# Patient Record
Sex: FEMALE | Race: WHITE | NOT HISPANIC OR LATINO | Employment: FULL TIME | URBAN - METROPOLITAN AREA
[De-identification: names, ages, dates, MRNs, and addresses within clinical notes are randomized per-mention and may not be internally consistent; named-entity substitution may affect disease eponyms.]

---

## 2018-03-31 ENCOUNTER — OFFICE VISIT (OUTPATIENT)
Dept: URGENT CARE | Facility: PHYSICIAN GROUP | Age: 68
End: 2018-03-31
Payer: COMMERCIAL

## 2018-03-31 VITALS
BODY MASS INDEX: 32.49 KG/M2 | HEART RATE: 83 BPM | WEIGHT: 195 LBS | HEIGHT: 65 IN | TEMPERATURE: 98.1 F | DIASTOLIC BLOOD PRESSURE: 78 MMHG | RESPIRATION RATE: 16 BRPM | OXYGEN SATURATION: 96 % | SYSTOLIC BLOOD PRESSURE: 120 MMHG

## 2018-03-31 DIAGNOSIS — L08.9 SKIN INFECTION: ICD-10-CM

## 2018-03-31 DIAGNOSIS — J45.20 MILD INTERMITTENT ASTHMA WITHOUT COMPLICATION: ICD-10-CM

## 2018-03-31 PROCEDURE — 99214 OFFICE O/P EST MOD 30 MIN: CPT | Performed by: NURSE PRACTITIONER

## 2018-03-31 RX ORDER — ALBUTEROL SULFATE 90 UG/1
2 AEROSOL, METERED RESPIRATORY (INHALATION) EVERY 6 HOURS PRN
Qty: 8.5 G | Refills: 0 | Status: SHIPPED | OUTPATIENT
Start: 2018-03-31 | End: 2018-06-23

## 2018-03-31 ASSESSMENT — ENCOUNTER SYMPTOMS
WEAKNESS: 0
FEVER: 0
CHILLS: 0
NECK PAIN: 0
BRUISES/BLEEDS EASILY: 0
MYALGIAS: 0

## 2018-03-31 NOTE — PROGRESS NOTES
Subjective:      Gigi Pagan is a 67 y.o. female who presents with Neck Pain (with swelling and readness from a scratch on the left side of her neck, unknown how it got there)            HPI  Gigi is here for left sided neck wound since yesterday morning when she awoke with injury. States unknown cause for injury, denies scratching self. States has used curling iron in past but has not used lately. Has been cleaning with rubbing alcohol.    Requesting refill of albuterol for intermittent asthma, no problems with this currently.    PMH:  has a past medical history of Anesthesia; Arthritis; ASTHMA; Backpain; Bronchitis; Cancer (CMS-HCC); Hypothyroidism; Indigestion; Jaundice; and Pneumonia. She also has no past medical history of Angina; Arrhythmia; CATARACT; Congestive heart failure (CMS-HCC); COPD; Diabetes; Dialysis; Glaucoma; Heart murmur; Heart valve disease; Hypertension; Infectious disease; Myocardial infarct; Other specified symptom associated with female genital organs; Pacemaker; Personal history of venous thrombosis and embolism; Psychiatric problem; Renal disorder; Rheumatic fever; Seizure (CMS-HCC); Stroke (CMS-HCC); Unspecified hemorrhagic conditions; or Unspecified urinary incontinence.  MEDS:   Current Outpatient Prescriptions:   •  mupirocin (BACTROBAN) 2 % Ointment, Apply 1 Application to affected area(s) 2 times a day., Disp: 60 g, Rfl: 0  •  albuterol 108 (90 Base) MCG/ACT Aero Soln inhalation aerosol, Inhale 2 Puffs by mouth every 6 hours as needed., Disp: 8.5 g, Rfl: 0  •  SYNTHROID 75 MCG TABS, Take 1 Tab by mouth every day., Disp: , Rfl:   •  cyclobenzaprine (FLEXERIL) 10 MG Tab, Take 1 Tab by mouth 3 times a day as needed., Disp: 30 Tab, Rfl: 0  •  meloxicam (MOBIC) 15 MG tablet, Take 1 Tab by mouth every day., Disp: 30 Tab, Rfl: 0  •  azithromycin (AZASITE) 1 % ophthalmic solution, Place 1 Drop in left eye every day., Disp: 2.5 mL, Rfl: 0  •  tramadol (ULTRAM) 50 MG Tab, Take  1-2 Tabs by mouth every four hours as needed. (Patient not taking: Reported on 10/17/2016), Disp: 20 Tab, Rfl: 0  •  fluticasone (FLONASE) 50 MCG/ACT nasal spray, Spray 2 Sprays in nose every day. Each Nostril (Patient not taking: Reported on 10/17/2016), Disp: 16 g, Rfl: 0  •  ALBUTEROL SULFATE INH, Inhale  by mouth., Disp: , Rfl:   •  hydrocodone-acetaminophen (VICODIN) 5-500 MG TABS, Take 1-2 Tabs by mouth every four hours as needed., Disp: , Rfl:   ALLERGIES:   Allergies   Allergen Reactions   • Gadolinium Anaphylaxis   • Tape Contact Dermatitis     SURGHX:   Past Surgical History:   Procedure Laterality Date   • PELVISCOPY  2011    Performed by KANDIS AARON at SURGERY [facility] ORS   • OMENTECTOMY  2011    Performed by KANDIS AARON at [facility]   • BIOPSY GENERAL  2011    Performed by KANDIS AARON at SURGERY [facility] ORS   • OOPHORECTOMY  2011    Performed by KANDIS AARON at SURGERY [facility] OR[facility]   • ERCP IN OR  2010    Performed by JONATAN CURRY at SURGERY [facility]   • ERCP W/ INSERTION STENT/TUBE  3/29/2010    Performed by BUNNY BAHENA at [facility]   • ERCP W/SPHINCTEROTOMY/PAPILL.  3/29/2010    Performed by BUNNY BAHENA at [facility]   • PB RESEC LIVER,PART LOBECTOMY     • GYN SURGERY      partial hysterectomy   • OTHER      1970 tonsilectomy    • OTHER ORTHOPEDIC SURGERY       spinal fusion  minucus disk   • PB  DELIVERY ONLY      c section x 4     SOCHX:  reports that she has never smoked. She has never used smokeless tobacco. She reports that she drinks alcohol. She reports that she does not use drugs.  FH: Family history was reviewed, no pertinent findings to report    Review of Systems   Constitutional: Negative for chills, fever and malaise/fatigue.   Musculoskeletal: Negative for myalgias and neck pain.   Skin: Negative for itching and rash.   Neurological: Negative for weakness.  "  Endo/Heme/Allergies: Does not bruise/bleed easily.   All other systems reviewed and are negative.         Objective:     /78   Pulse 83   Temp 36.7 °C (98.1 °F)   Resp 16   Ht 1.651 m (5' 5\")   Wt 88.5 kg (195 lb)   SpO2 96%   BMI 32.45 kg/m²      Physical Exam   Constitutional: She is oriented to person, place, and time. Vital signs are normal. She appears well-developed and well-nourished. She is active.  Non-toxic appearance. She does not have a sickly appearance. She does not appear ill. No distress.   HENT:   Head: Normocephalic.   Eyes: Conjunctivae and EOM are normal. Pupils are equal, round, and reactive to light.   Neck: Normal range of motion. Neck supple. No spinous process tenderness and no muscular tenderness present. No neck rigidity. No edema, no erythema and normal range of motion present.       Cardiovascular: Normal rate.    Pulmonary/Chest: Effort normal.   Musculoskeletal: Normal range of motion.   Neurological: She is alert and oriented to person, place, and time.   Skin: Skin is warm and dry. Laceration noted. No abrasion, no bruising, no burn, no ecchymosis, no lesion and no rash noted. She is not diaphoretic. There is erythema.   Superficial laceration with scab formation with localized redness around wound, slight swelling to site. Slight TTP to site.   Vitals reviewed.              Assessment/Plan:     1. Skin infection    - mupirocin (BACTROBAN) 2 % Ointment; Apply 1 Application to affected area(s) 2 times a day.  Dispense: 60 g; Refill: 0    2. Mild intermittent asthma without complication    - albuterol 108 (90 Base) MCG/ACT Aero Soln inhalation aerosol; Inhale 2 Puffs by mouth every 6 hours as needed.  Dispense: 8.5 g; Refill: 0    May clean area with mild soap, do not scrub, wash with tepid water, pat dry  Keep covered with abx ointment use  May use NSAID for any pain/discomfort  Monitor for increase in wound size, pain, redness with blistering, fever- " re-evaluate

## 2018-05-30 ENCOUNTER — HOSPITAL ENCOUNTER (OUTPATIENT)
Dept: RADIOLOGY | Facility: MEDICAL CENTER | Age: 68
End: 2018-05-30

## 2018-06-23 ENCOUNTER — APPOINTMENT (OUTPATIENT)
Dept: RADIOLOGY | Facility: MEDICAL CENTER | Age: 68
End: 2018-06-23
Attending: EMERGENCY MEDICINE
Payer: COMMERCIAL

## 2018-06-23 ENCOUNTER — HOSPITAL ENCOUNTER (OUTPATIENT)
Facility: MEDICAL CENTER | Age: 68
End: 2018-06-25
Attending: EMERGENCY MEDICINE | Admitting: HOSPITALIST
Payer: COMMERCIAL

## 2018-06-23 DIAGNOSIS — Z85.9 HISTORY OF CANCER: ICD-10-CM

## 2018-06-23 DIAGNOSIS — R29.810 FACIAL DROOP: ICD-10-CM

## 2018-06-23 DIAGNOSIS — R53.1 WEAKNESS: ICD-10-CM

## 2018-06-23 DIAGNOSIS — R51.9 ACUTE NONINTRACTABLE HEADACHE, UNSPECIFIED HEADACHE TYPE: ICD-10-CM

## 2018-06-23 DIAGNOSIS — R40.4 TRANSIENT ALTERATION OF AWARENESS: ICD-10-CM

## 2018-06-23 DIAGNOSIS — R20.2 PARESTHESIAS: ICD-10-CM

## 2018-06-23 DIAGNOSIS — R11.2 NON-INTRACTABLE VOMITING WITH NAUSEA, UNSPECIFIED VOMITING TYPE: ICD-10-CM

## 2018-06-23 DIAGNOSIS — R29.90 STROKE-LIKE SYMPTOM: ICD-10-CM

## 2018-06-23 LAB
ALBUMIN SERPL BCP-MCNC: 4.4 G/DL (ref 3.2–4.9)
ALBUMIN/GLOB SERPL: 1.6 G/DL
ALP SERPL-CCNC: 56 U/L (ref 30–99)
ALT SERPL-CCNC: 5 U/L (ref 2–50)
ANION GAP SERPL CALC-SCNC: 14 MMOL/L (ref 0–11.9)
ANISOCYTOSIS BLD QL SMEAR: ABNORMAL
APPEARANCE UR: CLEAR
APTT PPP: 25.3 SEC (ref 24.7–36)
AST SERPL-CCNC: 20 U/L (ref 12–45)
BASOPHILS # BLD AUTO: 0 % (ref 0–1.8)
BASOPHILS # BLD: 0 K/UL (ref 0–0.12)
BILIRUB SERPL-MCNC: 0.7 MG/DL (ref 0.1–1.5)
BILIRUB UR QL STRIP.AUTO: NEGATIVE
BUN SERPL-MCNC: 11 MG/DL (ref 8–22)
CALCIUM SERPL-MCNC: 9.5 MG/DL (ref 8.5–10.5)
CHLORIDE SERPL-SCNC: 109 MMOL/L (ref 96–112)
CO2 SERPL-SCNC: 16 MMOL/L (ref 20–33)
COLOR UR: YELLOW
CREAT SERPL-MCNC: 0.74 MG/DL (ref 0.5–1.4)
EOSINOPHIL # BLD AUTO: 0 K/UL (ref 0–0.51)
EOSINOPHIL NFR BLD: 0 % (ref 0–6.9)
ERYTHROCYTE [DISTWIDTH] IN BLOOD BY AUTOMATED COUNT: 41.1 FL (ref 35.9–50)
GLOBULIN SER CALC-MCNC: 2.8 G/DL (ref 1.9–3.5)
GLUCOSE SERPL-MCNC: 121 MG/DL (ref 65–99)
GLUCOSE UR STRIP.AUTO-MCNC: NEGATIVE MG/DL
HCT VFR BLD AUTO: 45.9 % (ref 37–47)
HGB BLD-MCNC: 14.6 G/DL (ref 12–16)
INR PPP: 1.06 (ref 0.87–1.13)
KETONES UR STRIP.AUTO-MCNC: ABNORMAL MG/DL
LEUKOCYTE ESTERASE UR QL STRIP.AUTO: NEGATIVE
LYMPHOCYTES # BLD AUTO: 2.63 K/UL (ref 1–4.8)
LYMPHOCYTES NFR BLD: 41.7 % (ref 22–41)
MANUAL DIFF BLD: NORMAL
MCH RBC QN AUTO: 28.9 PG (ref 27–33)
MCHC RBC AUTO-ENTMCNC: 31.8 G/DL (ref 33.6–35)
MCV RBC AUTO: 90.9 FL (ref 81.4–97.8)
MICRO URNS: ABNORMAL
MICROCYTES BLD QL SMEAR: ABNORMAL
MONOCYTES # BLD AUTO: 0.29 K/UL (ref 0–0.85)
MONOCYTES NFR BLD AUTO: 4.6 % (ref 0–13.4)
MORPHOLOGY BLD-IMP: NORMAL
NEUTROPHILS # BLD AUTO: 3.38 K/UL (ref 2–7.15)
NEUTROPHILS NFR BLD: 53.7 % (ref 44–72)
NITRITE UR QL STRIP.AUTO: NEGATIVE
NRBC # BLD AUTO: 0 K/UL
NRBC BLD-RTO: 0 /100 WBC
PH UR STRIP.AUTO: 5 [PH]
PLATELET # BLD AUTO: 118 K/UL (ref 164–446)
PMV BLD AUTO: 11 FL (ref 9–12.9)
POIKILOCYTOSIS BLD QL SMEAR: NORMAL
POTASSIUM SERPL-SCNC: 3.6 MMOL/L (ref 3.6–5.5)
PROT SERPL-MCNC: 7.2 G/DL (ref 6–8.2)
PROT UR QL STRIP: NEGATIVE MG/DL
PROTHROMBIN TIME: 13.5 SEC (ref 12–14.6)
RBC # BLD AUTO: 5.05 M/UL (ref 4.2–5.4)
RBC BLD AUTO: PRESENT
RBC UR QL AUTO: NEGATIVE
SODIUM SERPL-SCNC: 139 MMOL/L (ref 135–145)
SP GR UR REFRACTOMETRY: >1.045
TROPONIN I SERPL-MCNC: <0.01 NG/ML (ref 0–0.04)
UROBILINOGEN UR STRIP.AUTO-MCNC: 0.2 MG/DL
WBC # BLD AUTO: 6.3 K/UL (ref 4.8–10.8)

## 2018-06-23 PROCEDURE — 70450 CT HEAD/BRAIN W/O DYE: CPT

## 2018-06-23 PROCEDURE — 83036 HEMOGLOBIN GLYCOSYLATED A1C: CPT

## 2018-06-23 PROCEDURE — 85610 PROTHROMBIN TIME: CPT

## 2018-06-23 PROCEDURE — 0042T CT-CEREBRAL PERFUSION ANALYSIS: CPT

## 2018-06-23 PROCEDURE — 85007 BL SMEAR W/DIFF WBC COUNT: CPT

## 2018-06-23 PROCEDURE — 85730 THROMBOPLASTIN TIME PARTIAL: CPT

## 2018-06-23 PROCEDURE — 80053 COMPREHEN METABOLIC PANEL: CPT

## 2018-06-23 PROCEDURE — 70498 CT ANGIOGRAPHY NECK: CPT

## 2018-06-23 PROCEDURE — 85027 COMPLETE CBC AUTOMATED: CPT

## 2018-06-23 PROCEDURE — 700117 HCHG RX CONTRAST REV CODE 255: Performed by: EMERGENCY MEDICINE

## 2018-06-23 PROCEDURE — 99285 EMERGENCY DEPT VISIT HI MDM: CPT

## 2018-06-23 PROCEDURE — 36415 COLL VENOUS BLD VENIPUNCTURE: CPT

## 2018-06-23 PROCEDURE — 84484 ASSAY OF TROPONIN QUANT: CPT

## 2018-06-23 PROCEDURE — 81003 URINALYSIS AUTO W/O SCOPE: CPT

## 2018-06-23 PROCEDURE — 700111 HCHG RX REV CODE 636 W/ 250 OVERRIDE (IP): Performed by: EMERGENCY MEDICINE

## 2018-06-23 PROCEDURE — 70496 CT ANGIOGRAPHY HEAD: CPT

## 2018-06-23 PROCEDURE — 96374 THER/PROPH/DIAG INJ IV PUSH: CPT

## 2018-06-23 PROCEDURE — 84443 ASSAY THYROID STIM HORMONE: CPT

## 2018-06-23 RX ORDER — ACETAMINOPHEN 325 MG/1
650 TABLET ORAL EVERY 4 HOURS PRN
COMMUNITY
End: 2019-11-15

## 2018-06-23 RX ORDER — ALBUTEROL SULFATE 90 UG/1
2 AEROSOL, METERED RESPIRATORY (INHALATION) EVERY 6 HOURS PRN
COMMUNITY
End: 2022-09-18

## 2018-06-23 RX ORDER — ONDANSETRON 2 MG/ML
4 INJECTION INTRAMUSCULAR; INTRAVENOUS ONCE
Status: COMPLETED | OUTPATIENT
Start: 2018-06-23 | End: 2018-06-23

## 2018-06-23 RX ADMIN — IOHEXOL 120 ML: 350 INJECTION, SOLUTION INTRAVENOUS at 22:45

## 2018-06-23 RX ADMIN — ONDANSETRON 4 MG: 2 INJECTION INTRAMUSCULAR; INTRAVENOUS at 22:24

## 2018-06-23 ASSESSMENT — PAIN SCALES - GENERAL: PAINLEVEL_OUTOF10: 0

## 2018-06-24 ENCOUNTER — APPOINTMENT (OUTPATIENT)
Dept: RADIOLOGY | Facility: MEDICAL CENTER | Age: 68
End: 2018-06-24
Attending: HOSPITALIST
Payer: COMMERCIAL

## 2018-06-24 ENCOUNTER — APPOINTMENT (OUTPATIENT)
Dept: RADIOLOGY | Facility: MEDICAL CENTER | Age: 68
End: 2018-06-24
Attending: EMERGENCY MEDICINE
Payer: COMMERCIAL

## 2018-06-24 PROBLEM — E03.9 HYPOTHYROIDISM: Status: ACTIVE | Noted: 2018-06-24

## 2018-06-24 PROBLEM — R29.898 LEFT ARM WEAKNESS: Status: ACTIVE | Noted: 2018-06-24

## 2018-06-24 LAB
EKG IMPRESSION: NORMAL
EST. AVERAGE GLUCOSE BLD GHB EST-MCNC: 120 MG/DL
HBA1C MFR BLD: 5.8 % (ref 0–5.6)
LV EJECT FRACT  99904: 65
LV EJECT FRACT MOD 2C 99903: 65.37
LV EJECT FRACT MOD 4C 99902: 63.91
LV EJECT FRACT MOD BP 99901: 64.11
TSH SERPL DL<=0.005 MIU/L-ACNC: 2.02 UIU/ML (ref 0.38–5.33)

## 2018-06-24 PROCEDURE — G0378 HOSPITAL OBSERVATION PER HR: HCPCS

## 2018-06-24 PROCEDURE — 700111 HCHG RX REV CODE 636 W/ 250 OVERRIDE (IP): Performed by: EMERGENCY MEDICINE

## 2018-06-24 PROCEDURE — 96375 TX/PRO/DX INJ NEW DRUG ADDON: CPT

## 2018-06-24 PROCEDURE — G8987 SELF CARE CURRENT STATUS: HCPCS | Mod: CI

## 2018-06-24 PROCEDURE — 70551 MRI BRAIN STEM W/O DYE: CPT

## 2018-06-24 PROCEDURE — G8997 SWALLOW GOAL STATUS: HCPCS | Mod: CH

## 2018-06-24 PROCEDURE — 700102 HCHG RX REV CODE 250 W/ 637 OVERRIDE(OP): Performed by: HOSPITALIST

## 2018-06-24 PROCEDURE — 92610 EVALUATE SWALLOWING FUNCTION: CPT

## 2018-06-24 PROCEDURE — A9270 NON-COVERED ITEM OR SERVICE: HCPCS | Performed by: HOSPITALIST

## 2018-06-24 PROCEDURE — G8996 SWALLOW CURRENT STATUS: HCPCS | Mod: CH

## 2018-06-24 PROCEDURE — G8989 SELF CARE D/C STATUS: HCPCS | Mod: CI

## 2018-06-24 PROCEDURE — 93306 TTE W/DOPPLER COMPLETE: CPT

## 2018-06-24 PROCEDURE — G8988 SELF CARE GOAL STATUS: HCPCS | Mod: CI

## 2018-06-24 PROCEDURE — 97165 OT EVAL LOW COMPLEX 30 MIN: CPT

## 2018-06-24 PROCEDURE — 700105 HCHG RX REV CODE 258: Performed by: HOSPITALIST

## 2018-06-24 PROCEDURE — 93005 ELECTROCARDIOGRAM TRACING: CPT | Performed by: EMERGENCY MEDICINE

## 2018-06-24 PROCEDURE — 99219 PR INITIAL OBSERVATION CARE,LEVL II: CPT | Performed by: HOSPITALIST

## 2018-06-24 PROCEDURE — 93306 TTE W/DOPPLER COMPLETE: CPT | Mod: 26 | Performed by: INTERNAL MEDICINE

## 2018-06-24 PROCEDURE — 71045 X-RAY EXAM CHEST 1 VIEW: CPT

## 2018-06-24 RX ORDER — ASPIRIN 325 MG
325 TABLET ORAL DAILY
Status: DISCONTINUED | OUTPATIENT
Start: 2018-06-24 | End: 2018-06-25 | Stop reason: HOSPADM

## 2018-06-24 RX ORDER — HYDRALAZINE HYDROCHLORIDE 20 MG/ML
10 INJECTION INTRAMUSCULAR; INTRAVENOUS
Status: DISCONTINUED | OUTPATIENT
Start: 2018-06-24 | End: 2018-06-25 | Stop reason: HOSPADM

## 2018-06-24 RX ORDER — LEVOTHYROXINE SODIUM 0.07 MG/1
75 TABLET ORAL DAILY
Status: DISCONTINUED | OUTPATIENT
Start: 2018-06-24 | End: 2018-06-25 | Stop reason: HOSPADM

## 2018-06-24 RX ORDER — ATORVASTATIN CALCIUM 80 MG/1
80 TABLET, FILM COATED ORAL EVERY EVENING
Status: DISCONTINUED | OUTPATIENT
Start: 2018-06-24 | End: 2018-06-25 | Stop reason: HOSPADM

## 2018-06-24 RX ORDER — ASPIRIN 300 MG/1
300 SUPPOSITORY RECTAL DAILY
Status: DISCONTINUED | OUTPATIENT
Start: 2018-06-24 | End: 2018-06-25 | Stop reason: HOSPADM

## 2018-06-24 RX ORDER — DIPHENHYDRAMINE HYDROCHLORIDE 50 MG/ML
25 INJECTION INTRAMUSCULAR; INTRAVENOUS ONCE
Status: COMPLETED | OUTPATIENT
Start: 2018-06-24 | End: 2018-06-24

## 2018-06-24 RX ORDER — BISACODYL 10 MG
10 SUPPOSITORY, RECTAL RECTAL
Status: DISCONTINUED | OUTPATIENT
Start: 2018-06-24 | End: 2018-06-25 | Stop reason: HOSPADM

## 2018-06-24 RX ORDER — ASPIRIN 81 MG/1
324 TABLET, CHEWABLE ORAL DAILY
Status: DISCONTINUED | OUTPATIENT
Start: 2018-06-24 | End: 2018-06-25 | Stop reason: HOSPADM

## 2018-06-24 RX ORDER — METOCLOPRAMIDE HYDROCHLORIDE 5 MG/ML
10 INJECTION INTRAMUSCULAR; INTRAVENOUS ONCE
Status: COMPLETED | OUTPATIENT
Start: 2018-06-24 | End: 2018-06-24

## 2018-06-24 RX ORDER — AMOXICILLIN 250 MG
2 CAPSULE ORAL 2 TIMES DAILY
Status: DISCONTINUED | OUTPATIENT
Start: 2018-06-24 | End: 2018-06-25 | Stop reason: HOSPADM

## 2018-06-24 RX ORDER — LABETALOL HYDROCHLORIDE 5 MG/ML
10 INJECTION, SOLUTION INTRAVENOUS EVERY 4 HOURS PRN
Status: DISCONTINUED | OUTPATIENT
Start: 2018-06-24 | End: 2018-06-25 | Stop reason: HOSPADM

## 2018-06-24 RX ORDER — SODIUM CHLORIDE 9 MG/ML
1000 INJECTION, SOLUTION INTRAVENOUS ONCE
Status: COMPLETED | OUTPATIENT
Start: 2018-06-24 | End: 2018-06-25

## 2018-06-24 RX ORDER — POLYETHYLENE GLYCOL 3350 17 G/17G
1 POWDER, FOR SOLUTION ORAL
Status: DISCONTINUED | OUTPATIENT
Start: 2018-06-24 | End: 2018-06-25 | Stop reason: HOSPADM

## 2018-06-24 RX ADMIN — LEVOTHYROXINE SODIUM 75 MCG: 75 TABLET ORAL at 09:30

## 2018-06-24 RX ADMIN — ATORVASTATIN CALCIUM 80 MG: 80 TABLET, FILM COATED ORAL at 20:26

## 2018-06-24 RX ADMIN — METOCLOPRAMIDE 10 MG: 5 INJECTION, SOLUTION INTRAMUSCULAR; INTRAVENOUS at 00:44

## 2018-06-24 RX ADMIN — ASPIRIN 325 MG: 325 TABLET ORAL at 09:30

## 2018-06-24 RX ADMIN — DIPHENHYDRAMINE HYDROCHLORIDE 25 MG: 50 INJECTION, SOLUTION INTRAMUSCULAR; INTRAVENOUS at 00:44

## 2018-06-24 RX ADMIN — DOCUSATE SODIUM -SENNOSIDES 2 TABLET: 50; 8.6 TABLET, COATED ORAL at 09:31

## 2018-06-24 RX ADMIN — SODIUM CHLORIDE 1000 ML: 9 INJECTION, SOLUTION INTRAVENOUS at 06:08

## 2018-06-24 ASSESSMENT — ENCOUNTER SYMPTOMS
CHILLS: 0
FOCAL WEAKNESS: 1
PHOTOPHOBIA: 0
COUGH: 0
PALPITATIONS: 0
NAUSEA: 0
DIZZINESS: 0
DEPRESSION: 0
FEVER: 0
TINGLING: 1
WHEEZING: 0
DIARRHEA: 0
HEADACHES: 1
SHORTNESS OF BREATH: 0
VOMITING: 0
ABDOMINAL PAIN: 0
MYALGIAS: 0
SORE THROAT: 0

## 2018-06-24 ASSESSMENT — PAIN SCALES - GENERAL
PAINLEVEL_OUTOF10: 3
PAINLEVEL_OUTOF10: 0

## 2018-06-24 ASSESSMENT — COGNITIVE AND FUNCTIONAL STATUS - GENERAL
MOVING TO AND FROM BED TO CHAIR: A LITTLE
SUGGESTED CMS G CODE MODIFIER MOBILITY: CK
SUGGESTED CMS G CODE MODIFIER DAILY ACTIVITY: CJ
SUGGESTED CMS G CODE MODIFIER DAILY ACTIVITY: CK
MOBILITY SCORE: 18
MOVING FROM LYING ON BACK TO SITTING ON SIDE OF FLAT BED: A LITTLE
TOILETING: A LITTLE
DAILY ACTIVITIY SCORE: 18
TURNING FROM BACK TO SIDE WHILE IN FLAT BAD: A LITTLE
TOILETING: A LITTLE
WALKING IN HOSPITAL ROOM: A LITTLE
CLIMB 3 TO 5 STEPS WITH RAILING: A LITTLE
EATING MEALS: A LITTLE
STANDING UP FROM CHAIR USING ARMS: A LITTLE
DRESSING REGULAR LOWER BODY CLOTHING: A LITTLE
HELP NEEDED FOR BATHING: A LITTLE
HELP NEEDED FOR BATHING: A LITTLE
PERSONAL GROOMING: A LITTLE
DRESSING REGULAR LOWER BODY CLOTHING: A LITTLE
DAILY ACTIVITIY SCORE: 21
DRESSING REGULAR UPPER BODY CLOTHING: A LITTLE

## 2018-06-24 ASSESSMENT — PATIENT HEALTH QUESTIONNAIRE - PHQ9
2. FEELING DOWN, DEPRESSED, IRRITABLE, OR HOPELESS: NOT AT ALL
SUM OF ALL RESPONSES TO PHQ9 QUESTIONS 1 AND 2: 0
1. LITTLE INTEREST OR PLEASURE IN DOING THINGS: NOT AT ALL

## 2018-06-24 ASSESSMENT — COPD QUESTIONNAIRES
DURING THE PAST 4 WEEKS HOW MUCH DID YOU FEEL SHORT OF BREATH: NONE/LITTLE OF THE TIME
HAVE YOU SMOKED AT LEAST 100 CIGARETTES IN YOUR ENTIRE LIFE: NO/DON'T KNOW
DO YOU EVER COUGH UP ANY MUCUS OR PHLEGM?: NO/ONLY WITH OCCASIONAL COLDS OR INFECTIONS
COPD SCREENING SCORE: 2
IN THE PAST 12 MONTHS DO YOU DO LESS THAN YOU USED TO BECAUSE OF YOUR BREATHING PROBLEMS: DISAGREE/UNSURE

## 2018-06-24 ASSESSMENT — ACTIVITIES OF DAILY LIVING (ADL): TOILETING: INDEPENDENT

## 2018-06-24 ASSESSMENT — LIFESTYLE VARIABLES
ALCOHOL_USE: NO
EVER_SMOKED: NEVER

## 2018-06-24 NOTE — ASSESSMENT & PLAN NOTE
Patient has multiple neurologic symptoms. She reported bilateral facial weakness, face tingling, headache, left sided weakness, right facial droop as well as dysarthria. These have all since resolved except for very mild right facial droop. CT of the head, CTA of the head and neck, all have been normal. She will be admitted to the neurology floor with every 4 hour neurology checks, monitored on telemetry for evidence of cardiac dysrhythmias, and I will check an echocardiogram and MRI of the head. I will start her on a statin and an aspirin. I will check a TSH and a lipid panel. I have requested PT, OT, and speech therapy. We will allow for permissive hypertension overnight.

## 2018-06-24 NOTE — H&P
Hospital Medicine History and Physical    Date of Service  6/24/2018    Chief Complaint  Chief Complaint   Patient presents with   • Possible Stroke     sudden onset headache followed by expressive aphasia, facial numbness and droop, N/V x1 hour ago       History of Presenting Illness  67 y.o. female who presented on 6/23/2018 with multiple complaints including headache, difficulty with finding the correct words, bilateral facial numbness, and left arm weakness. The patient states that her symptoms began around 8:30 this past evening while she was at dinner. However, she has been tired and weak with decreased energy since Friday morning.  Because of these issues, she presented herself to the emergency room. Upon assessment here, all of her symptoms had resolved although interestingly, the emergency room physician noted a right facial droop which was mild and was also evident at the time of my visit to her bedside. Her history is positive for recent diagnosis of possible pancreatic mass and she has a known history of cholangiocarcinoma. She otherwise denies any fevers, chills, recent travel, sick contacts, diarrhea or dysuria.      Primary Care Physician  Pcp Unknown    Consultants  None    Code Status  Full    Review of Systems  Review of Systems   Constitutional: Negative for chills and fever.   HENT: Negative for congestion and sore throat.    Eyes: Negative for photophobia.   Respiratory: Negative for cough, shortness of breath and wheezing.    Cardiovascular: Negative for chest pain and palpitations.   Gastrointestinal: Negative for abdominal pain, diarrhea, nausea and vomiting.   Genitourinary: Negative for dysuria.   Musculoskeletal: Negative for myalgias.   Skin: Negative.    Neurological: Positive for tingling, focal weakness and headaches. Negative for dizziness.        Bilateral facial weakness and facial tingling, left arm weakness   Psychiatric/Behavioral: Negative for depression and suicidal ideas.  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   • Hypothyroidism    • Indigestion    • Jaundice     this admission  per pt she came in with jaundice   • Pneumonia     hx       Surgical History  Past Surgical History:   Procedure Laterality Date   • PELVISCOPY  2011    Performed by KANDIS AARON at SURGERY [facility] ORS   • OMENTECTOMY  2011    Performed by KANDIS AARON at SURGERY [facility]   • BIOPSY GENERAL  2011    Performed by KANDIS AARON at SURGERY [facility] OR[facility]   • OOPHORECTOMY  2011    Performed by KANDIS AARON at SURGERY [facility]   • ERCP IN OR  2010    Performed by JONATAN CURRY at SURGERY [facility]   • ERCP W/ INSERTION STENT/TUBE  3/29/2010    Performed by BUNNY BAHENA at SURGERY [facility]   • ERCP W/SPHINCTEROTOMY/PAPILL.  3/29/2010    Performed by BUNNY BAHENA at [facility]   • PB RESEC LIVER,PART LOBECTOMY     • GYN SURGERY      partial hysterectomy   • OTHER      1970 tonsilectomy    • OTHER ORTHOPEDIC SURGERY       spinal fusion  minucus disk   • PB  DELIVERY ONLY      c section x 4       Medications  No current facility-administered medications on file prior to encounter.      Current Outpatient Prescriptions on File Prior to Encounter   Medication Sig Dispense Refill   • SYNTHROID 75 MCG TABS Take 75 mcg by mouth every day.         Family History  Patient denies a history of MIs or CVAs    Social History  Social History   Substance Use Topics   • Smoking status: Never Smoker   • Smokeless tobacco: Never Used   • Alcohol use Yes       Allergies  Allergies   Allergen Reactions   • Gadolinium Anaphylaxis   • Tape Contact Dermatitis        Physical " Exam  Laboratory   Hemodynamics  Temp (24hrs), Av.2 °C (97.2 °F), Min:36.2 °C (97.2 °F), Max:36.2 °C (97.2 °F)   Temperature: 36.2 °C (97.2 °F)  Pulse  Av.4  Min: 72  Max: 89 Heart Rate (Monitored): 80  Blood Pressure : 143/77, NIBP: 132/51      Respiratory      Respiration: 17, Pulse Oximetry: 98 %             Physical Exam   Constitutional: She is oriented to person, place, and time. No distress.   HENT:   Head: Normocephalic and atraumatic.   Right Ear: External ear normal.   Left Ear: External ear normal.   Eyes: EOM are normal. Right eye exhibits no discharge. Left eye exhibits no discharge.   Neck: Neck supple. No JVD present.   Cardiovascular: Normal rate, regular rhythm and normal heart sounds.    Pulmonary/Chest: Effort normal and breath sounds normal. No respiratory distress. She exhibits no tenderness.   Abdominal: Soft. Bowel sounds are normal. She exhibits no distension. There is no tenderness.   Musculoskeletal: Normal range of motion. She exhibits no edema.   Neurological: She is alert and oriented to person, place, and time. No cranial nerve deficit.   Mild right-sided facial droop   Skin: Skin is warm and dry. She is not diaphoretic. No erythema.   Psychiatric: She has a normal mood and affect. Her behavior is normal.   Nursing note and vitals reviewed.    Capillary refill less than 3 seconds, distal pulses intact    Recent Labs      18   WBC  6.3   RBC  5.05   HEMOGLOBIN  14.6   HEMATOCRIT  45.9   MCV  90.9   MCH  28.9   MCHC  31.8*   RDW  41.1   PLATELETCT  118*   MPV  11.0     Recent Labs      18   SODIUM  139   POTASSIUM  3.6   CHLORIDE  109   CO2  16*   GLUCOSE  121*   BUN  11   CREATININE  0.74   CALCIUM  9.5     Recent Labs      18   ALTSGPT  5   ASTSGOT  20   ALKPHOSPHAT  56   TBILIRUBIN  0.7   GLUCOSE  121*     Recent Labs      18   APTT  25.3   INR  1.06             Lab Results   Component Value Date    TROPONINI <0.01 2018        Imaging  Ct-cta Head With & W/o-post Process    Result Date: 6/23/2018 6/23/2018 10:07 PM HISTORY/REASON FOR EXAM:  aphasia, facial pain, facial numbness, chest pain, and headache TECHNIQUE/EXAM DESCRIPTION: CT angiogram of the [place] without and with contrast.  Initial precontrast images were obtained of the head from the skull base through the vertex.  Postcontrast images were obtained of the [place] following the power injection of nonionic contrast at 5.0 mL/sec. Thin-section helical images were obtained with overlapping reconstruction interval. Coronal and sagittal multiplanar volume reformats were generated.  3D angiographic images were reviewed on PACS.  Maximum intensity projection (MIP) images were generated and reviewed. 80 mL of Omnipaque 350 nonionic contrast was injected intravenously. Low dose optimization technique was utilized for this CT exam including automated exposure control and adjustment of the mA and/or kV according to patient size. COMPARISON:  None. FINDINGS: The posterior circulation shows the distal vertebral arteries to be patent. The vertebrobasilar confluence is intact. The basilar artery is patent. No aneurysm or occlusive lesion is evident. The anterior circulation shows no stenotic or occlusive lesion. No aneurysm is evident about the [place]. No acute intracranial hemorrhage. ___________________________________     1. No hemodynamically significant narrowing of the major intracranial vessels.    Ct-cta Neck With & W/o-post Processing    Result Date: 6/23/2018 6/23/2018 10:07 PM HISTORY/REASON FOR EXAM:  expressive aphasia, facial pain, facial numbness TECHNIQUE/EXAM DESCRIPTION: CT angiogram of the neck with contrast. Postcontrast images were obtained of the neck from the great vessels through the skull base following the power injection of nonionic contrast at 5.0 mL/sec. Thin-section helical images were obtained with overlapping reconstruction  interval. Coronal and oblique multiplanar volume reformats were generated. Cervical internal carotid artery percent stenosis is calculated using the standard method according to the NASCET criteria wherein a segment of uniform caliber mid or distal cervical internal carotid is used as the reference denominator. 3D angiographic images were reviewed on PACS.  Maximum intensity projection (MIP) images were generated and reviewed 80 mL of Omnipaque 350 nonionic contrast was injected intravenously. Low dose optimization technique was utilized for this CT exam including automated exposure control and adjustment of the mA and/or kV according to patient size. COMPARISON:  10/28/2016. FINDINGS: There is right-sided mirror aortic arch. The arch origins of the great vessels are patent. Mild atherosclerotic disease of the bilateral carotid bulb. The right common carotid artery, cervical carotid bifurcation, and cervical internal carotid artery are patent with no evidence of significant stenosis, thrombus, or other filling defect or ulceration. There is no evidence of dissection or aneurysm. The left common carotid artery, cervical carotid bifurcation, and cervical internal carotid artery are patent with no evidence of significant stenosis, thrombus, or other filling defect or ulceration. There is no evidence of dissection or aneurysm. The cervical vertebral arteries are normal bilaterally. The neck soft tissues and lung apices in the field of view are unremarkable.     1. No evidence of flow-limiting stenosis in the cervical carotid or cervical vertebral arteries. 2. Right-sided mirror aortic arch    Ct-head W/o    Result Date: 6/23/2018 6/23/2018 10:06 PM HISTORY/REASON FOR EXAM:  Expressive aphasia. TECHNIQUE/EXAM DESCRIPTION AND NUMBER OF VIEWS: CT of the head without contrast. The study was performed on a helical multidetector CT scanner. Contiguous 2.5 mm axial sections were obtained from the skull base through the  vertex. Up to date radiation dose reduction adjustments have been utilized to meet ALARA standards for radiation dose reduction. COMPARISON:  7/1/2011 FINDINGS: There is no evidence of acute intracranial hemorrhage, mass, mass-effect or shift of midline structures. Redemonstration of 1.0 cm calcified meningioma in the right parafalcine in the posterior vertex. Gray-white matter differentiation is grossly preserved. Ventricle size and brain parenchymal volume are appropriate for this patient's stated age. The basal cisterns are patent. There are no abnormal extra-axial fluid collections. No depressed or widely  calvarial fracture is seen. The visualized paranasal sinuses and temporal bone structures are aerated. ___________________________________     1. No acute intracranial abnormality. No evidence of acute intracranial hemorrhage. 2. Redemonstration of 1.0 cm calcified meningioma in the right parafalcine in the posterior vertex. Please note, hyperacute ischemia can remain occult on CT. If ischemia is clinically suspected, MRI is suggested for further evaluation.    Dx-chest-limited (1 View)    Result Date: 6/24/2018 6/24/2018 12:08 AM HISTORY/REASON FOR EXAM:  Pain Following Trauma Stroke protocol. TECHNIQUE/EXAM DESCRIPTION AND NUMBER OF VIEWS: Single portable view of the chest. COMPARISON: 5/8/2010 FINDINGS: No pulmonary infiltrates or consolidations are noted. No pleural effusion. No pneumothorax. Stable cardiopericardial silhouette.     No acute cardiopulmonary abnormalities are identified.    Ct-cerebral Perfusion Analysis    Result Date: 6/23/2018 6/23/2018 10:07 PM HISTORY/REASON FOR EXAM:  poss stroke. expressive aphasia, facial pain, facial numbness TECHNIQUE/EXAM DESCRIPTION AND NUMBER OF VIEWS: CT Cerebral Perfusion Analysis. The study was performed on a 128 slice [employer]E. e-volo Multidetector CT scanner. Perfusion data and corresponding time-activity curves are processed and displayed as  color-coded maps in the axial plane for Cerebral Blood Flow (CBF), Cerebral Blood Volume  (CBV), T Max and Mean Transit Time (MTT) and are post processed on the Ischemia view-RAPID virtual . 120 mL of Omnipaque 350 nonionic contrast was injected intravenously. Low dose optimization technique was utilized for this CT exam including automated exposure control and adjustment of the mA and/or kV according to patient size. COMPARISON:  None. FINDINGS: CT perfusion examination over the limited sections of brain reveal that 0 mL of brain parenchyma has less than 30% of cerebral blood flow (CBF < 30%).     1.  CT perfusion examination over the limited section of brain reveals 0 mL of brain parenchyma has less than 30% of cerebral blood flow (CBF). 2.  Please note that the cerebral perfusion was performed on the limited brain tissue around the basal ganglia region. Infarct/ischemia outside the CT perfusion sections can be missed in this study.     Assessment/Plan     Anticipate that patient will need less than 2 midnights for management of the discussed medical issues.    * Left arm weakness   Assessment & Plan    Patient has multiple neurologic symptoms. She reported bilateral facial weakness, face tingling, headache, left sided weakness, right facial droop as well as dysarthria. These have all since resolved except for very mild right facial droop. CT of the head, CTA of the head and neck, all have been normal. She will be admitted to the neurology floor with every 4 hour neurology checks, monitored on telemetry for evidence of cardiac dysrhythmias, and I will check an echocardiogram and MRI of the head. I will start her on a statin and an aspirin. I will check a TSH and a lipid panel. I have requested PT, OT, and speech therapy. We will allow for permissive hypertension overnight.        Hypothyroidism   Assessment & Plan    This is chronic and stable, patient reports her last TSH was checked approximately 3 weeks  ago and was normal. Continue home Synthroid.          Prophylaxis: Sequential compression devices for DVT prophylaxis, no PPI indicated, bowel protocol as needed.

## 2018-06-24 NOTE — ASSESSMENT & PLAN NOTE
This is chronic and stable, patient reports her last TSH was checked approximately 3 weeks ago and was normal. Continue home Synthroid.

## 2018-06-24 NOTE — PROGRESS NOTES
2 RN skin assessment: Skin intact, right forearm, per patient dog bit her, very small abrasion dried up, bandaid over site

## 2018-06-24 NOTE — PROGRESS NOTES
Pt arrived by gordo from ED, pt helped to bed with 1 person standby assist. Pt alert oriented x4. Slight right facial droop. Pt has daughter that arrived with patient. Room orientation given. Reviewed orders, plan of day, and medications with patient. Pt understands NPO at this time. Fall precaution in place, bed alarm on. Call light within reach and will continue to monitor.

## 2018-06-24 NOTE — CONSULTS
Teleneurology Consultation Note        Patient Information  Patient name:      Gigi Pagan  MRN:         [id number]  :         1950  Date of Service: 2018    Facility: [facility]    Reason for Consult: acute stroke    Gender: female    Age: 67 y.o.      Patient History    Last known well: Known     History of Present Illness: 67 y.o. female who presents for evaluation of a possible stroke with symptoms of bilateral facial numbness and expressive aphasia onset around 2100 when the patient was eating dinner and drinking wine. She is actively vomiting. Per daughter, the symptoms were preceded by a headache and facial pain that has since resolved. 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    Allergies:  Allergies   Allergen Reactions   • Gadolinium Anaphylaxis   • Tape Contact Dermatitis       Hospital Medications:  No current facility-administered medications for this encounter.     Current Outpatient Prescriptions:   •  mupirocin (BACTROBAN) 2 % Ointment, Apply 1 Application to affected area(s) 2 times a day., Disp: 60 g, Rfl: 0  •  albuterol 108 (90 Base) MCG/ACT Aero Soln inhalation aerosol, Inhale 2 Puffs by mouth every 6 hours as needed., Disp: 8.5 g, Rfl: 0  •  cyclobenzaprine (FLEXERIL) 10 MG Tab, Take 1 Tab by mouth 3 times a day as needed., Disp: 30 Tab, Rfl: 0  •  meloxicam (MOBIC) 15 MG tablet, Take 1 Tab by mouth every day., Disp: 30 Tab, Rfl: 0  •  azithromycin (AZASITE) 1 % ophthalmic solution, Place 1 Drop in left eye every day., Disp: 2.5 mL, Rfl: 0  •  tramadol (ULTRAM) 50 MG Tab, Take 1-2 Tabs by mouth every four hours as needed. (Patient not taking: Reported on 10/17/2016), Disp: 20 Tab, Rfl: 0  •  fluticasone (FLONASE) 50 MCG/ACT nasal spray, Spray 2 Sprays in nose every day. Each Nostril (Patient not taking: Reported on 10/17/2016), Disp: 16 g, Rfl: 0  •  ALBUTEROL SULFATE INH, Inhale   by mouth., Disp: , Rfl:   •  hydrocodone-acetaminophen (VICODIN) 5-500 MG TABS, Take 1-2 Tabs by mouth every four hours as needed., Disp: , Rfl:   •  SYNTHROID 75 MCG TABS, Take 1 Tab by mouth every day., Disp: , Rfl:     Current Outpatient Medications:    (Not in a hospital admission)    Physical Exam:    Awake and alert, mute, follows some simple commands, no gaze preference  EOMI, mild right facial weakness  Moves all four extremities antigravity without drift    NIH Stroke Scale:    1a. Level of Consciousness (Alert, drowsy, etc): 0= Alert    1b. LOC Questions (Month, age): 2= Incorrect    1c. LOC Commands (Open/close eyes make fist/let go): 0= Obeys both correctly    2.   Best Gaze (Eyes open - patient follows examiner's finger on face): 0= Normal    3.   Visual Fields (introduce visual stimulus/threat to patient's field quadrants): 0= No visual loss  4.   Facial Paresis (Show teeth, raise eyebrows and squeeze eyes shut): 1= Minor     5a. Motor Arm - Left (Elevate arm to 90 degrees if patient is sitting, 45 degrees if  supine): 0= No drift    5b. Motor Arm - Right (Elevate arm to 90 degrees if patient is sitting, 45 degrees if supine): 0= No drift    6a. Motor Leg - Left (Elevate leg 30 degrees with patient supine): 0= No drift    6b. Motor Leg - Right  (Elevate leg 30 degrees with patient supine): 0= No drift    7.   Limb Ataxia (Finger-nose, heel down shin): 0= No ataxia    8.   Sensory (Pin prick to face, arm, trunk and leg - compare side to side): 0= Normal    9.  Best Language (Name item, describe a picture and read sentences): 3= Mute    10. Dysarthria (Evaluate speech clarity by patient repeating listed words): 2= Near to unintelligible or worse    11. Extinction and Inattention (Use information from prior testing to identify neglect or  double simultaneous stimuli testing): 0= No neglect    Total NIH Score: 8    Imaging: CTH without acute intracranial abnormality, CTA/CTP unremarkable    Laboratory:        Recent Labs      06/23/18   2203   APTT  25.3   INR  1.06       Patient is a TPA candidate: yes    Patient may be an IR candidate: no    Diagnosis: CVA vs. toxic-metabolic, sequela of malignancy     Recommendation: Discussed risks/benefits of IV tPA specifically in the setting of known malignancy, patient's daughter (with patient communicating non-verbally) declined treatment; Brain MRI    Addison Jenkins M.D.    Date: __6/23/2018_____  [facility]:    This consultation was conducted utilizing secure and encrypted videoconferencing equipment with the assistance of a trained tele-presenter at the originating site.

## 2018-06-24 NOTE — CARE PLAN
Problem: Safety  Goal: Will remain free from injury  Outcome: PROGRESSING AS EXPECTED    Goal: Will remain free from falls  Outcome: PROGRESSING AS EXPECTED  Intervention: Fall precautions reviewed, fall precautions in place, bed alarm on, call light within reach, pt alert and oriented x4 and calling for assistance    Problem: Venous Thromboembolism (VTW)/Deep Vein Thrombosis (DVT) Prevention:  Goal: Patient will participate in Venous Thrombosis (VTE)/Deep Vein Thrombosis (DVT)Prevention Measures  Outcome: PROGRESSING AS EXPECTED  Intervention: SCD in place and on, pt educated

## 2018-06-24 NOTE — ED NOTES
Med rec completed by interview with patient at bedside  Pt reported only taking Synthroid 75 mcg daily, albuterol inhaler as needed for SOB, and Tylenol as needed for pain  No abx in past 30 days  Allergy reviewed (pt reported that she will die if she is given gadolinium)

## 2018-06-24 NOTE — PROGRESS NOTES
Admitted after midnight at 134 AM, she has TIA symptoms, getting complete work up, speech has improved and she is passed her swallow eval, regular diet started. Await work up for further decision planning.

## 2018-06-24 NOTE — PROGRESS NOTES
Pt is A&Ox4, denies pain at this time. Pt is SBA to the bathroom and voiding without difficulty. Pt c/o discomfort at her IV site and it was noted to have slight edema and redness. IV discontinued. Pt denies any other needs at this time. Call light within reach. Tele on. Hourly rounding in place.

## 2018-06-24 NOTE — ED TRIAGE NOTES
"Chief Complaint   Patient presents with   • Possible Stroke     sudden onset headache followed by expressive aphasia, facial numbness and droop, N/V x1 hour ago     This RN called to  by tech to assess Pt w/ stroke like symptoms, Pt having above symptoms, appears markedly unwell. Pt additionally has a possibly recent diagnosis of metastatic cancer. Initial triage NIHSS of 8, Pt taken to charge desk as a stroke alert immediately, ERP paged to charge desk. PIV placed, blood drawn by lab and sent to lab, Pt to CT at this time. Report to Dr. Powell and Gayathri RN.     Blood pressure 143/77, pulse 89, temperature 36.2 °C (97.2 °F), temperature source Temporal, resp. rate 14, height 1.626 m (5' 4\"), weight 90.7 kg (200 lb), SpO2 94 %.      "

## 2018-06-24 NOTE — DISCHARGE PLANNING
This CM spoke to the pt and gave her an Advance Directives packet.  No safety or other issues identified at this time.  This does not appear to be a difficult discharge.    Care Transition Team Assessment    Information Source  Orientation : Oriented x 4  Information Given By: Patient    Readmission Evaluation  Is this a readmission?: No    Elopement Risk  Legal Hold: No  Ambulatory or Self Mobile in Wheelchair: Yes  Elopement Risk: Not at Risk for Elopement    Interdisciplinary Discharge Planning  Does Admitting Nurse Feel This Could be a Complex Discharge?: No  Lives with - Patient's Self Care Capacity: Adult Children  Support Systems: Family Member(s)  Housing / Facility: [address] [place] [address]  Do You Take your Prescribed Medications Regularly: Yes  Able to Return to Previous ADL's: Yes  Mobility Issues: No  Patient Expects to be Discharged to:: Home  Assistance Needed: No  Durable Medical Equipment: Not Applicable    Discharge Preparedness  What is your plan after discharge?: Home with help  What are your discharge supports?: Child  Prior Functional Level: Ambulatory, Independent with Activities of Daily Living  Difficulity with ADLs: None    Functional Assesment  Prior Functional Level: Ambulatory, Independent with Activities of Daily Living    Finances  Financial Barriers to Discharge: No  Prescription Coverage: Yes         Values / Beliefs / Concerns  Values / Beliefs Concerns : No    Advance Directive  Advance Directive?: None  Advance Directive offered?: AD Booklet given              Discharge Risks or Barriers  Discharge risks or barriers?: No    Anticipated Discharge Information  Anticipated discharge disposition: Home

## 2018-06-24 NOTE — THERAPY
"Occupational Therapy Evaluation completed.   Functional Status:  Pt is a 66 y/o female admitted with HA, n/v, and facial numbness. She is currently at a supv level for basic self cares, functional mobility, and functional transfers without AD. She reports her facial numbness has almost resolved, and that she primarily just feels tired. She denies any further deficits in ADLs at this time and will have good support from family if needed.   Plan of Care: Patient with no further skilled OT needs in the acute care setting at this time  Discharge Recommendations:  Equipment: No Equipment Needed. Currently anticipate no further skilled therapy needs once patient is discharged from the inpatient setting.    See \"Rehab Therapy-Acute\" Patient Summary Report for complete documentation.    "

## 2018-06-24 NOTE — THERAPY
"  Speech Language Therapy Clinical Swallow Evaluation completed.  Functional Status: Patient seen this date for CSE in the setting of right sided facial droop and concerns for stroke-like symptoms. She was awake and alert. Oriented to person, place, date, and reason for admission. Patient with very appropriate conversation and memory during the evaluation. No dysarthria appreciated. Mild right facial droop appreciated. Oral motor exam WLF, dentition intact, swallow palpated as complete. Patient denies any numbness of the face or neck at this time. PO trials of ice chips, thin liquids (spoon, cup, consecutive swallows), pureed consistencies, soft mechanicals, and hard mechanicals. She demonstrated timely oral phase and trigger of swallow with all trials. No s/sx concerning for difficulty or aspiration with any consistency. Recommend starting patient on a regular diet with thin liquids. SLP will f/u to ensure tolerance. Please hold PO and contact SLP with any noted difficulty.    Recommendations - Diet: Diet / Liquid Recommendation: Regular, Thin Liquid                          Strategies: Head of Bed at 90 Degrees                          Medication Administration: Medication Administration : Whole with Liquid Wash  Plan of Care: Will benefit from Speech Therapy 1 times per week  Post-Acute Therapy: Currently anticipate no further skilled therapy needs once patient is discharged from the inpatient setting.    See \"Rehab Therapy-Acute\" Patient Summary Report for complete documentation.   "

## 2018-06-24 NOTE — ED PROVIDER NOTES
ED PROVIDER NOTE     Scribed for Seymour Powell M.D. by Gabriel Baltazar. 6/23/2018, 10:01 PM.    CHIEF COMPLAINT  Chief Complaint   Patient presents with   • Possible Stroke     sudden onset headache followed by expressive aphasia, facial numbness and droop, N/V x1 hour ago       HPI    Primary care provider: Not on file  Means of arrival: Walk in  History obtained from: Daughter and patient  History limited by: Acuity of condition    Gigi Pagan is a 67 y.o. female who presents for evaluation of a possible stroke with symptoms of bilateral facial numbness and expressive aphasia onset around 9 PM when the patient was eating dinner and drinking a single glass of wine. She is actively vomiting. Per daughter, the symptoms were preceded by a headache and facial pain that has since resolved. The patient is noted to have a history of cholangiocarcinoma that is possibly metastasized to her pancreas, diagnosed 2 weeks ago pending detailed workup next week at [facility]. She has not recently sustained any falls or head trauma. The patient has no prior history of DVT, PE, or MI. She is negative for fever. No alleviating or exacerbating factor are identified at this time. Symptoms constant since onset. Patient only able to say 'cold' and 'tummy' during initial examination.     REVIEW OF SYSTEMS  Constitutional: Negative for fever.  Gastrointestinal: Positive for vomiting.  Neurological: Positive for right sided facial numbness, weakness, and expressive aphasia.   Further ROS limited secondary to patient's altered mental status.     PAST MEDICAL HISTORY   has a past medical history of Anesthesia; Arthritis; ASTHMA; Backpain; Bronchitis; Cancer (HCC); Hypothyroidism; Indigestion; Jaundice; and Pneumonia.    PAST FAMILY HISTORY  Daughter denies, thus not pertinent    SOCIAL HISTORY  Social History     Social History Main Topics   • Smoking status: Never Smoker   • Smokeless tobacco: Never Used   • Alcohol use Yes   • Drug  "use: No   • Sexual activity: None noted       SURGICAL HISTORY   has a past surgical history that includes other orthopedic surgery; other; ercp w/ insertion stent/tube (3/29/2010); ercp w/sphincterotomy/papill. (3/29/2010); ercp in or (2010);  delivery only; gyn surgery (); resec liver,part lobectomy (); pelviscopy (2011); omentectomy (2011); biopsy general (2011); and oophorectomy (2011).    CURRENT MEDICATIONS  Home Medications     Reviewed by Nancy Mendez, Pharmacy Intern (Pharmacy Intern) on 18 at 2353  Med List Status: Complete   Medication Last Dose Status   acetaminophen (TYLENOL) 325 MG Tab 2018 Active   albuterol 108 (90 Base) MCG/ACT Aero Soln inhalation aerosol >week ago Active   SYNTHROID 75 MCG TABS 2018 Active                ALLERGIES  Allergies   Allergen Reactions   • Gadolinium Anaphylaxis   • Tape Contact Dermatitis       PHYSICAL EXAM  VITAL SIGNS: /77   Pulse 89   Temp 36.2 °C (97.2 °F) (Temporal)   Resp 14   Ht 1.626 m (5' 4\")   SpO2 94%    Pulse ox interpretation: On room air, I interpret this pulse ox as normal.  Constitutional: Well developed, well nourished. In severe distress initially examined in wheelchair at charge desk. Occasionally retching over emesis bag.   HEENT: Normocephalic, atraumatic. Posterior pharynx clear, mucous membranes dry.  Eyes:  EOMI. Normal sclera. PERRL 3-2. Injected sclerae.   Neck: Supple, Full range of motion, nontender.  Chest/Pulmonary: Diminished at bases, no wheezes or rhonchi.  Cardiovascular: Regular rate and rhythm, no murmur.   Abdomen: Soft, nontender, no rebound, guarding, or masses.  Back: No CVA tenderness, nontender midline, no step offs.  Musculoskeletal: No deformity, no edema.  Neuro: Right sided facial droop more than left. No focal weakness. Able to sense cold.   Psych: Minimally responsive.   Skin: No rashes, warm and dry.       DIAGNOSTIC STUDIES / PROCEDURES    LABS & " EKG  Results for orders placed or performed during the hospital encounter of 06/23/18   URINALYSIS   Result Value Ref Range    Micro Urine Req see below     Color Yellow     Character Clear     Ph 5.0 5.0 - 8.0    Glucose Negative Negative mg/dL    Ketones Trace (A) Negative mg/dL    Protein Negative Negative mg/dL    Bilirubin Negative Negative    Urobilinogen, Urine 0.2 Negative    Nitrite Negative Negative    Leukocyte Esterase Negative Negative    Occult Blood Negative Negative   PROTHROMBIN TIME   Result Value Ref Range    PT 13.5 12.0 - 14.6 sec    INR 1.06 0.87 - 1.13   APTT   Result Value Ref Range    APTT 25.3 24.7 - 36.0 sec   CBC WITH DIFFERENTIAL   Result Value Ref Range    Nucleated RBC 0.00 /100 WBC    NRBC (Absolute) 0.00 K/uL    WBC 6.3 4.8 - 10.8 K/uL    RBC 5.05 4.20 - 5.40 M/uL    Hemoglobin 14.6 12.0 - 16.0 g/dL    Hematocrit 45.9 37.0 - 47.0 %    MCV 90.9 81.4 - 97.8 fL    MCH 28.9 27.0 - 33.0 pg    MCHC 31.8 (L) 33.6 - 35.0 g/dL    RDW 41.1 35.9 - 50.0 fL    Platelet Count 118 (L) 164 - 446 K/uL    MPV 11.0 9.0 - 12.9 fL    Neutrophils-Polys 53.70 44.00 - 72.00 %    Lymphocytes 41.70 (H) 22.00 - 41.00 %    Monocytes 4.60 0.00 - 13.40 %    Eosinophils 0.00 0.00 - 6.90 %    Basophils 0.00 0.00 - 1.80 %    Neutrophils (Absolute) 3.38 2.00 - 7.15 K/uL    Lymphs (Absolute) 2.63 1.00 - 4.80 K/uL    Monos (Absolute) 0.29 0.00 - 0.85 K/uL    Eos (Absolute) 0.00 0.00 - 0.51 K/uL    Baso (Absolute) 0.00 0.00 - 0.12 K/uL    Anisocytosis 1+     Microcytosis 1+    COMP METABOLIC PANEL   Result Value Ref Range    Sodium 139 135 - 145 mmol/L    Potassium 3.6 3.6 - 5.5 mmol/L    Chloride 109 96 - 112 mmol/L    Co2 16 (L) 20 - 33 mmol/L    Anion Gap 14.0 (H) 0.0 - 11.9    Glucose 121 (H) 65 - 99 mg/dL    Bun 11 8 - 22 mg/dL    Creatinine 0.74 0.50 - 1.40 mg/dL    Calcium 9.5 8.5 - 10.5 mg/dL    AST(SGOT) 20 12 - 45 U/L    ALT(SGPT) 5 2 - 50 U/L    Alkaline Phosphatase 56 30 - 99 U/L    Total Bilirubin 0.7 0.1 -  1.5 mg/dL    Albumin 4.4 3.2 - 4.9 g/dL    Total Protein 7.2 6.0 - 8.2 g/dL    Globulin 2.8 1.9 - 3.5 g/dL    A-G Ratio 1.6 g/dL   TROPONIN   Result Value Ref Range    Troponin I <0.01 0.00 - 0.04 ng/mL   ESTIMATED GFR   Result Value Ref Range    GFR If African American >60 >60 mL/min/1.73 m 2    GFR If Non African American >60 >60 mL/min/1.73 m 2   DIFFERENTIAL MANUAL   Result Value Ref Range    Manual Diff Status PERFORMED    PERIPHERAL SMEAR REVIEW   Result Value Ref Range    Peripheral Smear Review see below    MORPHOLOGY   Result Value Ref Range    RBC Morphology Present     Poikilocytosis 1+    REFRACTOMETER SG   Result Value Ref Range    Specific Gravity >1.045    Hemoglobin A1C   Result Value Ref Range    Glycohemoglobin 5.8 (H) 0.0 - 5.6 %    Est Avg Glucose 120 mg/dL   TSH (Thyroid Stimulating Hormone)   Result Value Ref Range    TSH 2.020 0.380 - 5.330 uIU/mL   EKG (ER)   Result Value Ref Range    Report       [facility] Emergency Dept.    Test Date:  2018  Pt Name:    FABY HAIRSTON              Department: ER  MRN:        [id number]                      Room:       [facility]  Gender:     Female                       Technician: [id number]  :        1950                   Requested By:SEYMOUR THORNE  Order #:    [id number]                    Reading MD: Seymour Thorne MD    Measurements  Intervals                                Axis  Rate:       71                           P:          46  MO:         164                          QRS:        -19  QRSD:       96                           T:          19  QT:         408  QTc:        444    Interpretive Statements  SINUS RHYTHM  BORDERLINE LEFT AXIS DEVIATION  No STEMI, strain, or dysrhythmia  Compared to ECG 2011 12:41:39  Right ventricular hypertrophy no longer present    Electronically Signed On 2018 17:14:58 PDT by Seymour Thorne MD         RADIOLOGY  MR-BRAIN-W/O   Final Result      1.  10 mm right parietal  parafalcine calcified meningioma.   2.  Mild-moderate supratentorial white matter disease most consistent with microvascular ischemic change.   3.  No evidence of acute infarction or acute hemorrhage.      DX-CHEST-LIMITED (1 VIEW)   Final Result         No acute cardiopulmonary abnormalities are identified.      CT-CTA NECK WITH & W/O-POST PROCESSING   Final Result         1. No evidence of flow-limiting stenosis in the cervical carotid or cervical vertebral arteries.      2. Right-sided mirror aortic arch      CT-CTA HEAD WITH & W/O-POST PROCESS   Final Result         1. No hemodynamically significant narrowing of the major intracranial vessels.      CT-CEREBRAL PERFUSION ANALYSIS   Final Result      1.  CT perfusion examination over the limited section of brain reveals 0 mL of brain parenchyma has less than 30% of cerebral blood flow (CBF).      2.  Please note that the cerebral perfusion was performed on the limited brain tissue around the basal ganglia region. Infarct/ischemia outside the CT perfusion sections can be missed in this study.      CT-HEAD W/O   Final Result         1. No acute intracranial abnormality. No evidence of acute intracranial hemorrhage.      2. Redemonstration of 1.0 cm calcified meningioma in the right parafalcine in the posterior vertex.      Please note, hyperacute ischemia can remain occult on CT. If ischemia is clinically suspected, MRI is suggested for further evaluation.      Echocardiogram Comp W/O cont    (Results Pending)       COURSE & MEDICAL DECISION MAKING    This is a 67 y.o. female who presents with altered mental status, inability to speak, vomiting, facial weakness.     Differential Diagnosis includes but is not limited to:  Stroke, intracranial hemorrhage, malignancy, migraine, intoxication, encephalopathy.     ED Course:  10:01 PM -  Patient seen and acutely evaluated near paramedic bay. Ordered Troponin, CMP, CBC, APTT, PTT, Urinalysis, CT-cerebral perfusion, CT-CTA  neck, CT-CTA head, and DX-Chest to evaluate symptoms. Stroke alert activated. If the patient's depressed mental status worsens or she continues to vomit I'd consider airway protection and intubation; daughter, however, reports patient recently stated she wanted to be DNR/DNI.  The patient is having a hard time following commands and so I cannot complete an entire NIH stroke scale but she has significant dysarthria, facial weakness, and as such as at least 4 so I have activated our stroke alert protocol.    10:08 PM - Paged Neurology after initial assessment.     10:16 PM - Consult with Dr. Jenkins, Neurology, who will await patient's radiology results and will consult remotely.     10:35 PM - Reviewed patient's radiology results as shown above. No acute bleed.     10:37 PM - Neurology was paged again.  The patient's mental status is improving greatly on reassessment.  She is now able to speak fairly clearly she has only mild dysarthria, some slight poor coordination of her right lower extremity, slight right facial droop, NIH stroke scale 4 on recheck.    10:43 PM - Consult with Dr. Jenkins, Neurology, who was updated with the patient's radiology results. He agrees to see the patient.     In discussion with neurologist, patient and family denied TPA with discussion with neurologist.  While she is a candidate and within a treatment window, they have significant concerns about any complications and so neurology recommends not administering alteplase.    11:26 PM - Discussed lab and radiology results with the patient. I had a long discussion with patient about administering tPA. She is still considering the option and has not made a choice yet.  We have had extensive serial discussions about risks benefits and alternatives.    11:46 PM - Patient was reevaluated at bedside. She remains hesitant about receive tPA.  On reassessment she now only has a very subtle right facial droop but no dysarthria and normal  coordination and strength.  But she has an NIH stroke scale of 1.  We had very in depth shared decision making conversations, she has my personal opinion of this I was a family member with administer alteplase.  Given that she shown rapid improvement of symptoms I would withhold, and let her know that if her symptoms have remained as severe as they were on arrival would have absolutely administered alteplase.  Patient has serious concerns about adverse reactions even though I described only 1-2% serious risk of intracranial hemorrhage, however she remains very worried about complications.  This seems in line with her wishes, as she is just recently had long discussions with family that she would not like to be full code and would prefer to be a DNR/DNI and not have aggressive interventions taken regarding her health.  She may have metastatic cancer in her abdomen or elsewhere, making a potential bleed a serious risk.  Since she is improving so much, I feel that the patient has appropriate decision-making capacity to refuse alteplase at present.     11:51 PM - Paged Neurology.      11:57 PM - Consult with Dr. Jenkins, Neurology, who was informed the patient is hesitant about receiving tPA and shows improvement. He does not recommend alteplase.  The patient's labs are reassuring.  She has no severe glucose abnormality, her white blood cell count is normal, she has a mild acidosis likely secondary to tachypnea and vomiting.  Coagulation studies are normal.  No evidence of urinary tract infection.  The patient was reassessed and she still having subtle facial paresthesias and a mild headache I will give her Reglan and Benadryl for symptom control.    12:43 AM - Patient was reevaluated at bedside. She is resting comfortably. Patient made aware she will be admitted for observation. Family and patient are agreeable. Paresthesias and headache improved. Clear speech. Feels well.     1:06 AM - EKG without obvious STEMI or  strain or dysrhythmia. [facility] Hospitalist.    1:12 AM - I spoke to Dr. Mccoy, Hospitalist, who kindly agrees to admit the patient.     Upon my evaluation, this patient had a high probability of imminent or life-threatening deterioration due to acute encephalopathy concerning for possible stroke.     I personally provided 45 minutes of total critical care time outside of time spent on separately billable/documented procedures. This required my direct attention, intervention, and management which included the following:  -review of laboratory data  -review of radiology studies  -discussion with consultants: neurology, pharmacy, hospitalist  -Multiple discussions with the patient and family regarding possible administration of alteplase  -monitoring for potential decompensation      Medications   levothyroxine (SYNTHROID) tablet 75 mcg (75 mcg Oral Given 6/24/18 0930)   Pharmacy Consult Request - Allow for Permissive Hypertension (not administered)   senna-docusate (PERICOLACE or SENOKOT S) 8.6-50 MG per tablet 2 Tab (2 Tabs Oral Given 6/24/18 0931)     And   polyethylene glycol/lytes (MIRALAX) PACKET 1 Packet (not administered)     And   magnesium hydroxide (MILK OF MAGNESIA) suspension 30 mL (not administered)     And   bisacodyl (DULCOLAX) suppository 10 mg (not administered)   labetalol (NORMODYNE,TRANDATE) injection 10 mg (not administered)     Or   hydrALAZINE (APRESOLINE) injection 10 mg (not administered)   atorvastatin (LIPITOR) tablet 80 mg (0 mg Oral Held 6/24/18 0200)   aspirin (ASA) tablet 325 mg (325 mg Oral Given 6/24/18 0930)     Or   aspirin (ASA) chewable tab 324 mg ( Oral See Alternative 6/24/18 0930)     Or   aspirin (ASA) suppository 300 mg ( Rectal See Alternative 6/24/18 0930)   ondansetron (ZOFRAN) syringe/vial injection 4 mg (4 mg Intravenous Given 6/23/18 2224)   iohexol (OMNIPAQUE) 350 mg/mL (120 mL Intravenous Given 6/23/18 2245)   metoclopramide (REGLAN) injection 10 mg (10 mg Intravenous  Given 6/24/18 0044)   diphenhydrAMINE (BENADRYL) injection 25 mg (25 mg Intravenous Given 6/24/18 0044)   NS infusion 1,000 mL (1,000 mL Intravenous New Bag 6/24/18 0608)       FINAL IMPRESSION  1. Transient alteration of awareness    2. Non-intractable vomiting with nausea, unspecified vomiting type    3. Weakness    4. Facial droop    5. History of cancer    6. Acute nonintractable headache, unspecified headache type    7. Paresthesias    8. Stroke-like symptom          -ADMIT-    Pertinent Labs & Imaging studies reviewed and verified by myself, as well as nursing notes and the patient's past medical, family, and social histories (See chart for details).    Results, exam findings, clinical impression, presumed diagnosis, treatment options, and plan for admission were discussed with the patient and family, and they verbalized understanding, agreed with, and appreciated the plan of care.    Gabriel GARIBAY (Davionibannmarie), am scribing for, and in the presence of, Seymour Powell M.D..    Electronically signed by: Gabriel Baltazar (Chase), 6/23/2018    Seymour GARIBAY M.D. personally performed the services described in this documentation, as scribed by Gabriel Baltazar in my presence, and it is both accurate and complete.    Portions of this record were made with voice recognition software.  Despite my review, spelling/grammar/context errors may still remain.  Interpretation of this chart should be taken in this context.    The note accurately reflects work and decisions made by me.  Seymour Powell  6/24/2018  5:16 PM

## 2018-06-25 VITALS
HEIGHT: 64 IN | BODY MASS INDEX: 32.44 KG/M2 | TEMPERATURE: 98.1 F | WEIGHT: 190.04 LBS | DIASTOLIC BLOOD PRESSURE: 54 MMHG | OXYGEN SATURATION: 93 % | SYSTOLIC BLOOD PRESSURE: 100 MMHG | RESPIRATION RATE: 14 BRPM | HEART RATE: 86 BPM

## 2018-06-25 LAB
ANION GAP SERPL CALC-SCNC: 7 MMOL/L (ref 0–11.9)
BUN SERPL-MCNC: 13 MG/DL (ref 8–22)
CALCIUM SERPL-MCNC: 8.9 MG/DL (ref 8.5–10.5)
CHLORIDE SERPL-SCNC: 110 MMOL/L (ref 96–112)
CHOLEST SERPL-MCNC: 144 MG/DL (ref 100–199)
CO2 SERPL-SCNC: 24 MMOL/L (ref 20–33)
CREAT SERPL-MCNC: 0.61 MG/DL (ref 0.5–1.4)
ERYTHROCYTE [DISTWIDTH] IN BLOOD BY AUTOMATED COUNT: 40.6 FL (ref 35.9–50)
GLUCOSE SERPL-MCNC: 88 MG/DL (ref 65–99)
HCT VFR BLD AUTO: 40.4 % (ref 37–47)
HDLC SERPL-MCNC: 46 MG/DL
HGB BLD-MCNC: 13.4 G/DL (ref 12–16)
LDLC SERPL CALC-MCNC: 85 MG/DL
MCH RBC QN AUTO: 29.8 PG (ref 27–33)
MCHC RBC AUTO-ENTMCNC: 33.2 G/DL (ref 33.6–35)
MCV RBC AUTO: 89.8 FL (ref 81.4–97.8)
PLATELET # BLD AUTO: 138 K/UL (ref 164–446)
PMV BLD AUTO: 10.4 FL (ref 9–12.9)
POTASSIUM SERPL-SCNC: 4.3 MMOL/L (ref 3.6–5.5)
RBC # BLD AUTO: 4.5 M/UL (ref 4.2–5.4)
SODIUM SERPL-SCNC: 141 MMOL/L (ref 135–145)
TRIGL SERPL-MCNC: 64 MG/DL (ref 0–149)
WBC # BLD AUTO: 4.4 K/UL (ref 4.8–10.8)

## 2018-06-25 PROCEDURE — A9270 NON-COVERED ITEM OR SERVICE: HCPCS | Performed by: HOSPITALIST

## 2018-06-25 PROCEDURE — G0378 HOSPITAL OBSERVATION PER HR: HCPCS

## 2018-06-25 PROCEDURE — 80048 BASIC METABOLIC PNL TOTAL CA: CPT

## 2018-06-25 PROCEDURE — 700102 HCHG RX REV CODE 250 W/ 637 OVERRIDE(OP): Performed by: HOSPITALIST

## 2018-06-25 PROCEDURE — 36415 COLL VENOUS BLD VENIPUNCTURE: CPT

## 2018-06-25 PROCEDURE — 85027 COMPLETE CBC AUTOMATED: CPT

## 2018-06-25 PROCEDURE — 80061 LIPID PANEL: CPT

## 2018-06-25 PROCEDURE — 99217 PR OBSERVATION CARE DISCHARGE: CPT | Performed by: HOSPITALIST

## 2018-06-25 RX ADMIN — LEVOTHYROXINE SODIUM 75 MCG: 75 TABLET ORAL at 08:17

## 2018-06-25 RX ADMIN — DOCUSATE SODIUM -SENNOSIDES 2 TABLET: 50; 8.6 TABLET, COATED ORAL at 08:17

## 2018-06-25 RX ADMIN — ASPIRIN 325 MG: 325 TABLET ORAL at 08:17

## 2018-06-25 ASSESSMENT — PAIN SCALES - GENERAL
PAINLEVEL_OUTOF10: 5
PAINLEVEL_OUTOF10: 5
PAINLEVEL_OUTOF10: 0
PAINLEVEL_OUTOF10: 0

## 2018-06-25 NOTE — PROGRESS NOTES
Dr Saleem notified pt does not want to be attempted for IV insertion anymore. Several attempts made on prior shift unsuccessful, pt does not get anything through IV at this time. Ok per doctor for pt not to have IV access. Pt updated

## 2018-06-25 NOTE — CARE PLAN
Problem: Safety  Goal: Will remain free from injury  Outcome: PROGRESSING AS EXPECTED    Goal: Will remain free from falls  Outcome: PROGRESSING AS EXPECTED  Intervention: fall precautions reviewed, pt steady gait ambulating independently. Pt alert and oriented x4 and aware to call for assistance out of bed    Problem: Pain Management  Goal: Pain level will decrease to patient's comfort goal  Outcome: PROGRESSING AS EXPECTED  Intervention: Chronic back pain but at this time comfortable and denies pain, aware to call RN if need any intervention for pain

## 2018-06-25 NOTE — DISCHARGE INSTRUCTIONS
Discharge Instructions    Discharged to home by car with relative. Discharged via walking, hospital escort: declines.  Special equipment needed: Not Applicable    Be sure to schedule a follow-up appointment with your primary care doctor or any specialists as instructed.     Discharge Plan:   Diet Plan: Discussed - regular diet  Activity Level: Discussed - activity as tolerated  Confirmed Follow up Appointment: Patient to Call and Schedule Appointment - follow up [place]  on Wed June 27.   Confirmed Symptoms Management: Discussed  Medication Reconciliation Updated: Yes  Pneumococcal Vaccine Administered/Refused: Not given - Patient refused pneumococcal vaccine  Influenza Vaccine Indication: Patient Refuses    I understand that a diet low in cholesterol, fat, and sodium is recommended for good health. Unless I have been given specific instructions below for another diet, I accept this instruction as my diet prescription.   Other diet: regular diet    Special Instructions:     Discharge patient Home   Diet regular with 9-13 servings of fruits and vegetables   Activities as tolerated   Follow ups with your primary care MD in 7-10 days, call for appointment   Follow up IN  on Wed June 27.  Meds per your discharge instructions  No smoking, no alcohol, no caffeine   Wear seat belt in motorized vehicle   Take medications as prescribed   Keep appointments   If symptoms worsen call PCP, 911 or urgent care.    · Is patient discharged on Warfarin / Coumadin?   No     Depression / Suicide Risk    As you are discharged from this [facility] facility, it is important to learn how to keep safe from harming yourself.    Recognize the warning signs:  · Abrupt changes in personality, positive or negative- including increase in energy   · Giving away possessions  · Change in eating patterns- significant weight changes-  positive or negative  · Change in sleeping patterns- unable to sleep or sleeping all the time   · Unwillingness or  inability to communicate  · Depression  · Unusual sadness, discouragement and loneliness  · Talk of wanting to die  · Neglect of personal appearance   · Rebelliousness- reckless behavior  · Withdrawal from people/activities they love  · Confusion- inability to concentrate     If you or a loved one observes any of these behaviors or has concerns about self-harm, here's what you can do:  · Talk about it- your feelings and reasons for harming yourself  · Remove any means that you might use to hurt yourself (examples: pills, rope, extension cords, firearm)  · Get professional help from the community (Mental Health, Substance Abuse, psychological counseling)  · Do not be alone:Call your Safe Contact- someone whom you trust who will be there for you.  · Call your local CRISIS HOTLINE 589-3411 or 172-742-6213  · Call your local Children's Mobile Crisis Response Team [place] (272) 353-3799 or www.WineSimple  · Call the toll free National Suicide Prevention Hotlines   · National Suicide Prevention Lifeline 284-848-JAIU (7882)  · National Hope Line Network 800-SUICIDE (204-4385)

## 2018-06-25 NOTE — PROGRESS NOTES
Pt w/o c/o. States she is ready for d/c. Discharge instructions given to patient at bedside, verbalizes understanding and states plans for follow-up with PCP and Cardiologist as recommended. Individualized instruction and Krames discharge information provided on Heart Failure, low sodium/fluid restricted diet, new and home medication review, post-discharge activity level, smoking/etoh cessation and worsening of symptoms needing follow-up care. Telemetry monitor/IV cathlon removed. All belongings accounted for, all questions answered at this time. Leaves walking with family @ side, denies need for escort, ambs without difficulty.

## 2018-06-25 NOTE — DISCHARGE SUMMARY
Discharge Summary    CHIEF COMPLAINT ON ADMISSION  Chief Complaint   Patient presents with   • Possible Stroke     sudden onset headache followed by expressive aphasia, facial numbness and droop, N/V x1 hour ago       Reason for Admission  Facial Numbness; Vomiting     Admission Date  6/23/2018    CODE STATUS  Full Code    HPI & HOSPITAL COURSE  This is a 67 y.o. female here with dizziness and lightheadedness.  The patient was also experiencing facial numbness and some nausea vomiting.  The patient had a complete workup for TIA which is negative.  MRI of the head shows a 10 mm meningioma that at this point is benign.  This will need to be monitored but no urgent intervention is recommended.  Patient otherwise back to her baseline.  She has a follow-up with her primary pancreatic team on Wednesday which she will be going to.  At this point lipid panels are normal we will not continue her on a cholesterol medication.  Speech did see her in this point recommends a normal diet.  Patient's physical activity and occupational activity is back to normal so I canceled those evaluations.  Cognition is normal at this point patient talking with the cognitive team they do not feel that she needs an evaluation at this point.  Patient at this point can be discharged with outpatient follow-ups and monitoring.       Therefore, she is discharged in good and stable condition to home with close outpatient follow-up.    The patient recovered much more quickly than anticipated on admission.    Discharge Date  6/25/2018    FOLLOW UP ITEMS POST DISCHARGE  Follow-up with primary care physician in 7-10 days    DISCHARGE DIAGNOSES  Principal Problem:    Left arm weakness POA: Unknown  Active Problems:    Hypothyroidism POA: Unknown  Resolved Problems:    * No resolved hospital problems. *      FOLLOW UP  No future appointments.  No follow-up provider specified.    MEDICATIONS ON DISCHARGE     Medication List      CONTINUE taking these  medications      Instructions   acetaminophen 325 MG Tabs  Commonly known as:  TYLENOL   Take 650 mg by mouth every four hours as needed for Mild Pain.  Dose:  650 mg     albuterol 108 (90 Base) MCG/ACT Aers inhalation aerosol   Inhale 2 Puffs by mouth every 6 hours as needed for Shortness of Breath.  Dose:  2 Puff     SYNTHROID 75 MCG Tabs  Generic drug:  levothyroxine   Take 75 mcg by mouth every day.  Dose:  75 mcg            Allergies  Allergies   Allergen Reactions   • Gadolinium Anaphylaxis   • Tape Contact Dermatitis       DIET  Orders Placed This Encounter   Procedures   • Diet Order Regular     Standing Status:   Standing     Number of Occurrences:   1     Order Specific Question:   Diet:     Answer:   Regular [1]     Order Specific Question:   Consistency/Fluid modifications:     Answer:   Thin Liquids [3]       ACTIVITY  As tolerated.  Weight bearing as tolerated    CONSULTATIONS  None    PROCEDURES  None    LABORATORY  Lab Results   Component Value Date    SODIUM 141 06/25/2018    POTASSIUM 4.3 06/25/2018    CHLORIDE 110 06/25/2018    CO2 24 06/25/2018    GLUCOSE 88 06/25/2018    BUN 13 06/25/2018    CREATININE 0.61 06/25/2018    CREATININE 0.8 03/09/2009        Lab Results   Component Value Date    WBC 4.4 (L) 06/25/2018    HEMOGLOBIN 13.4 06/25/2018    HEMATOCRIT 40.4 06/25/2018    PLATELETCT 138 (L) 06/25/2018        Total time of the discharge process exceeds 35 minutes.

## 2018-06-25 NOTE — CARE PLAN
Problem: Bowel/Gastric:  Goal: Normal bowel function is maintained or improved  Outcome: PROGRESSING AS EXPECTED  States BM yesterday. Given PO scheduled senna today per her request    Problem: Knowledge Deficit  Goal: Knowledge of disease process/condition, treatment plan, diagnostic tests, and medications will improve  Outcome: PROGRESSING AS EXPECTED  Discussed plan of care for today w/ pt this am, she is A+O, she verbalizes understanding of her plan of care, no questions. Emotional support given. Will monitor for educational needs. Discussed pt's care with Hospitalist. Pt to d/c.

## 2018-06-25 NOTE — PROGRESS NOTES
Report received, pt care resumed. Pt alert and oriented x4. Pt refusing IV access at this time. Multiple attempts were made on prior shift. Pt denies pain, no signs of distress. Will contact MD about IV access. Fall precautions in place, call light within reach and will continue to monitor.

## 2018-11-17 ENCOUNTER — OFFICE VISIT (OUTPATIENT)
Dept: URGENT CARE | Facility: PHYSICIAN GROUP | Age: 68
End: 2018-11-17
Payer: COMMERCIAL

## 2018-11-17 VITALS
HEART RATE: 78 BPM | WEIGHT: 190 LBS | HEIGHT: 64 IN | RESPIRATION RATE: 16 BRPM | TEMPERATURE: 98.5 F | SYSTOLIC BLOOD PRESSURE: 122 MMHG | OXYGEN SATURATION: 96 % | BODY MASS INDEX: 32.44 KG/M2 | DIASTOLIC BLOOD PRESSURE: 82 MMHG

## 2018-11-17 DIAGNOSIS — J02.9 ACUTE PHARYNGITIS, UNSPECIFIED ETIOLOGY: ICD-10-CM

## 2018-11-17 DIAGNOSIS — J02.9 SORE THROAT: ICD-10-CM

## 2018-11-17 DIAGNOSIS — H92.03 ACUTE EAR PAIN, BILATERAL: ICD-10-CM

## 2018-11-17 DIAGNOSIS — H69.90 EUSTACHIAN TUBE DYSFUNCTION, UNSPECIFIED LATERALITY: ICD-10-CM

## 2018-11-17 LAB
INT CON NEG: NEGATIVE
INT CON POS: POSITIVE
S PYO AG THROAT QL: NEGATIVE

## 2018-11-17 PROCEDURE — 99214 OFFICE O/P EST MOD 30 MIN: CPT | Performed by: NURSE PRACTITIONER

## 2018-11-17 PROCEDURE — 87880 STREP A ASSAY W/OPTIC: CPT | Performed by: NURSE PRACTITIONER

## 2018-11-17 RX ORDER — AMOXICILLIN AND CLAVULANATE POTASSIUM 875; 125 MG/1; MG/1
1 TABLET, FILM COATED ORAL 2 TIMES DAILY
Qty: 14 TAB | Refills: 0 | Status: SHIPPED | OUTPATIENT
Start: 2018-11-17 | End: 2018-11-24

## 2018-11-17 ASSESSMENT — ENCOUNTER SYMPTOMS
SINUS PAIN: 0
ABDOMINAL PAIN: 0
NECK PAIN: 0
SORE THROAT: 1
SPUTUM PRODUCTION: 0
HEADACHES: 0
EYE REDNESS: 0
MYALGIAS: 0
COUGH: 0
SHORTNESS OF BREATH: 0
WEAKNESS: 0
NAUSEA: 0
DIZZINESS: 0
WHEEZING: 0
VOMITING: 0
FEVER: 0
DIARRHEA: 0
CHILLS: 0
EYE DISCHARGE: 0
CONSTIPATION: 0

## 2018-11-17 NOTE — PROGRESS NOTES
"Subjective:      Gigi Pagan is a 68 y.o. female who presents with Pharyngitis (sore throat, ear pain, x 2-3 days)            HPI  Gigi is here for sore throat and ear pain x 3 days. No OTC meds for symptoms. No nasal sprays or nasal flushing. Denies fevers. H/o liver cancer. 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 States dry scratchy throat with voice change.    PMH:  has a past medical history of Anesthesia; Arthritis; ASTHMA; Backpain; Bronchitis; Cancer (HCC); Hypothyroidism; Indigestion; Jaundice; and Pneumonia. She also has no past medical history of Angina; Arrhythmia; CATARACT; Congestive heart failure (HCC); COPD; Diabetes; Dialysis; Glaucoma; Heart murmur; Heart valve disease; Hypertension; Infectious disease; Myocardial infarct (HCC); Other specified symptom associated with female genital organs; Pacemaker; Personal history of venous thrombosis and embolism; Psychiatric problem; Renal disorder; Rheumatic fever; Seizure (HCC); Stroke (HCC); Unspecified hemorrhagic conditions; or Unspecified urinary incontinence.  MEDS:   Current Outpatient Prescriptions:   •  SYNTHROID 75 MCG TABS, Take 75 mcg by mouth every day., Disp: , Rfl:   •  albuterol 108 (90 Base) MCG/ACT Aero Soln inhalation aerosol, Inhale 2 Puffs by mouth every 6 hours as needed for Shortness of Breath., Disp: , Rfl:   •  acetaminophen (TYLENOL) 325 MG Tab, Take 650 mg by mouth every four hours as needed for Mild Pain., Disp: , Rfl:   ALLERGIES:   Allergies   Allergen Reactions   • Gadolinium Anaphylaxis   • Tape Contact Dermatitis     SURGHX:   Past Surgical History:   Procedure Laterality Date   • PELVISCOPY  4/29/2011    Performed by KANDIS AARON at SURGERY [facility] ORS   • OMENTECTOMY  4/29/2011    Performed by KANDIS AARON at SURGERY [facility] ORS   • BIOPSY GENERAL  4/29/2011    Performed by KANDIS AARON at SURGERY [facility] ORS   • OOPHORECTOMY  4/29/2011    Performed by " "KANDIS AARON at SURGERY [facility] ORS   • ERCP IN OR  2010    Performed by JONATAN CURRY at SURGERY [facility] ORS   • ERCP W/ INSERTION STENT/TUBE  3/29/2010    Performed by BUNNY BAHENA at SURGERY [facility] ORS   • ERCP W/SPHINCTEROTOMY/PAPILL.  3/29/2010    Performed by BUNNY BAHENA at SURGERY [facility] ORS   • PB RESEC LIVER,PART LOBECTOMY     • GYN SURGERY      partial hysterectomy   • OTHER      1970 tonsilectomy    • OTHER ORTHOPEDIC SURGERY       spinal fusion  minucus disk   • PB  DELIVERY ONLY      c section x 4     SOCHX:  reports that she has never smoked. She has never used smokeless tobacco. She reports that she drinks alcohol. She reports that she does not use drugs.  FH: Family history was reviewed, no pertinent findings to report    Review of Systems   Constitutional: Positive for malaise/fatigue. Negative for chills and fever.   HENT: Positive for congestion, ear pain and sore throat. Negative for sinus pain.    Eyes: Negative for discharge and redness.   Respiratory: Negative for cough, sputum production, shortness of breath and wheezing.    Gastrointestinal: Negative for abdominal pain, constipation, diarrhea, nausea and vomiting.   Musculoskeletal: Negative for myalgias and neck pain.   Skin: Negative for itching and rash.   Neurological: Negative for dizziness, weakness and headaches.   Endo/Heme/Allergies: Negative for environmental allergies.   All other systems reviewed and are negative.         Objective:     /82 (BP Location: Left arm, Patient Position: Sitting, BP Cuff Size: Adult)   Pulse 78   Temp 36.9 °C (98.5 °F)   Resp 16   Ht 1.626 m (5' 4\")   Wt 86.2 kg (190 lb)   SpO2 96%   BMI 32.61 kg/m²      Physical Exam   Constitutional: She is oriented to person, place, and time. Vital signs are normal. She appears well-developed and well-nourished. She is active and cooperative.  Non-toxic appearance. She does not have a sickly " appearance. She does not appear ill. No distress.   HENT:   Head: Normocephalic.   Right Ear: External ear and ear canal normal. Tympanic membrane is injected.   Left Ear: External ear and ear canal normal. Tympanic membrane is injected.   Nose: Mucosal edema, rhinorrhea and sinus tenderness present. Right sinus exhibits maxillary sinus tenderness and frontal sinus tenderness. Left sinus exhibits maxillary sinus tenderness and frontal sinus tenderness.   Mouth/Throat: Uvula is midline. Mucous membranes are dry. No uvula swelling. Posterior oropharyngeal erythema present.   Eyes: Pupils are equal, round, and reactive to light. Conjunctivae and EOM are normal.   Neck: Normal range of motion. Neck supple.   Cardiovascular: Normal rate and regular rhythm.    Pulmonary/Chest: Effort normal and breath sounds normal. No accessory muscle usage. No respiratory distress. She has no decreased breath sounds. She has no wheezes. She has no rhonchi. She has no rales.   Musculoskeletal: Normal range of motion.   Lymphadenopathy:     She has no cervical adenopathy.   Neurological: She is alert and oriented to person, place, and time.   Skin: Skin is warm and dry. She is not diaphoretic.   Vitals reviewed.              Assessment/Plan:     1. Sore throat    - POCT Rapid Strep A: NEG    2. Acute pharyngitis, unspecified etiology    - lidocaine viscous 2% (XYLOCAINE) 2 % Solution; Take 15 mL by mouth 4 times a day as needed for Throat/Mouth Pain for up to 5 days. May gargle and spit out as needed for throat pain  Dispense: 300 mL; Refill: 0  - amoxicillin-clavulanate (AUGMENTIN) 875-125 MG Tab; Take 1 Tab by mouth 2 times a day for 7 days.  Dispense: 14 Tab; Refill: 0    3. Acute ear pain, bilateral    4. Eustachian tube dysfunction, unspecified laterality    Contingent antibiotic prescription given to patient to fill upon meeting criteria of guidelines discussed. Patient request abx due to liver cancer status.  Increase water  intake  Get rest  May use Ibuprofen/Tylenol prn for any fever, body aches or throat pain  May take longer acting antihistamine for seasonal allergy symptoms prn  May use saline nasal spray for nasal congestion  May use Flonase or Nasocort for allergen nasal congestion  May gargle with salt water prn for throat discomfort  May drink smoothies for nutrition if too painful to swallow solid foods  Monitor for productive cough, SOB and chest pain/tightness- need re-evaluation

## 2019-04-21 ENCOUNTER — OFFICE VISIT (OUTPATIENT)
Dept: URGENT CARE | Facility: PHYSICIAN GROUP | Age: 69
End: 2019-04-21
Payer: COMMERCIAL

## 2019-04-21 VITALS
BODY MASS INDEX: 32.44 KG/M2 | TEMPERATURE: 98.8 F | OXYGEN SATURATION: 97 % | HEART RATE: 72 BPM | RESPIRATION RATE: 16 BRPM | WEIGHT: 190 LBS | HEIGHT: 64 IN | SYSTOLIC BLOOD PRESSURE: 128 MMHG | DIASTOLIC BLOOD PRESSURE: 78 MMHG

## 2019-04-21 DIAGNOSIS — J30.2 SEASONAL ALLERGIES: ICD-10-CM

## 2019-04-21 DIAGNOSIS — J01.00 ACUTE NON-RECURRENT MAXILLARY SINUSITIS: ICD-10-CM

## 2019-04-21 DIAGNOSIS — H69.91 EUSTACHIAN TUBE DYSFUNCTION, RIGHT: ICD-10-CM

## 2019-04-21 PROCEDURE — 99214 OFFICE O/P EST MOD 30 MIN: CPT | Performed by: PHYSICIAN ASSISTANT

## 2019-04-21 RX ORDER — FLUTICASONE PROPIONATE 50 MCG
2 SPRAY, SUSPENSION (ML) NASAL DAILY
Qty: 16 G | Refills: 0 | Status: SHIPPED | OUTPATIENT
Start: 2019-04-21 | End: 2022-09-18

## 2019-04-21 ASSESSMENT — ENCOUNTER SYMPTOMS
FOCAL WEAKNESS: 0
BLURRED VISION: 0
TINGLING: 0
HEADACHES: 0
EYE DISCHARGE: 1
FEVER: 0
SENSORY CHANGE: 0
SPEECH CHANGE: 0
COUGH: 0
DOUBLE VISION: 0
DIZZINESS: 0
LOSS OF CONSCIOUSNESS: 0
CHILLS: 0
PHOTOPHOBIA: 0
EYE PAIN: 0
HEARTBURN: 0
NAUSEA: 0
SEIZURES: 0

## 2019-04-21 NOTE — PROGRESS NOTES
Subjective:   Gigi Pagan is a 68 y.o. female who presents for Numbness (Rt facial/eye-swelling, Rt ear pain  x1days )        Hx of cholangiocarcinoma, PDL-2.     Pt complains of right eye puffiness, facial tenderness, and sensation of being underwater due to fullness in her right ear X 3 days.  Eye puffiness has nearly resolved.  However ear symptoms and facial discomfort persist.  She admits to mild congestion.  Denies cough, fever, chills, headaches, nausea, vomiting, motor or sensory changes, changes in cognition.  She takes Zyrtec daily.  Admits to history of environmental allergies.      Review of Systems   Constitutional: Negative for chills and fever.   HENT: Positive for congestion.    Eyes: Positive for discharge (yellow/white). Negative for blurred vision, double vision, photophobia and pain.   Respiratory: Negative for cough.    Gastrointestinal: Negative for heartburn and nausea.   Neurological: Negative for dizziness, tingling, sensory change, speech change, focal weakness, seizures, loss of consciousness and headaches.       PMH:  has a past medical history of Anesthesia; Arthritis; ASTHMA; Backpain; Bronchitis; Cancer (HCC); Hypothyroidism; Indigestion; Jaundice; and Pneumonia. She also has no past medical history of Angina; Arrhythmia; CATARACT; Congestive heart failure (HCC); COPD; Diabetes; Dialysis; Glaucoma; Heart murmur; Heart valve disease; Hypertension; Infectious disease; Myocardial infarct (HCC); Other specified symptom associated with female genital organs; Pacemaker; Personal history of venous thrombosis and embolism; Psychiatric problem; Renal disorder; Rheumatic fever; Seizure (HCC); Stroke (HCC); Unspecified hemorrhagic conditions; or Unspecified urinary incontinence.  MEDS:   Current Outpatient Prescriptions:   •  fluticasone (FLONASE) 50 MCG/ACT nasal spray, Spray 2 Sprays in nose every day., Disp: 16 g, Rfl: 0  •  albuterol 108 (90 Base) MCG/ACT Aero Soln inhalation  "aerosol, Inhale 2 Puffs by mouth every 6 hours as needed for Shortness of Breath., Disp: , Rfl:   •  acetaminophen (TYLENOL) 325 MG Tab, Take 650 mg by mouth every four hours as needed for Mild Pain., Disp: , Rfl:   •  SYNTHROID 75 MCG TABS, Take 75 mcg by mouth every day., Disp: , Rfl:   ALLERGIES:   Allergies   Allergen Reactions   • Gadolinium Anaphylaxis   • Tape Contact Dermatitis     SURGHX:   Past Surgical History:   Procedure Laterality Date   • PELVISCOPY  2011    Performed by KANDIS AARON at SURGERY [facility] ORS   • OMENTECTOMY  2011    Performed by KANDIS AARON at SURGERY [facility] ORS   • BIOPSY GENERAL  2011    Performed by KANDIS AARON at SURGERY [facility] ORS   • OOPHORECTOMY  2011    Performed by KANDIS AARON at SURGERY [facility] ORS   • ERCP IN OR  2010    Performed by JONATAN CURRY at SURGERY [facility] ORS   • ERCP W/ INSERTION STENT/TUBE  3/29/2010    Performed by BUNNY BAHENA at SURGERY [facility]   • ERCP W/SPHINCTEROTOMY/PAPILL.  3/29/2010    Performed by BUNNY BAHENA at SURGERY [facility]   • PB RESEC LIVER,PART LOBECTOMY     • GYN SURGERY      partial hysterectomy   • OTHER      1970 tonsilectomy    • OTHER ORTHOPEDIC SURGERY       spinal fusion  minucus disk   • PB  DELIVERY ONLY      c section x 4     SOCHX:  reports that she has never smoked. She has never used smokeless tobacco. She reports that she drinks alcohol. She reports that she does not use drugs.  FH: Family history was reviewed, no pertinent findings to report   Objective:   /78   Pulse 72   Temp 37.1 °C (98.8 °F) (Temporal)   Resp 16   Ht 1.626 m (5' 4\")   Wt 86.2 kg (190 lb)   SpO2 97%   BMI 32.61 kg/m²   Physical Exam   Constitutional: She is oriented to person, place, and time. She appears well-developed and well-nourished.  Non-toxic appearance. No distress.   HENT:   Head: Normocephalic and atraumatic.   Right Ear: Tympanic " membrane, external ear and ear canal normal.   Left Ear: Tympanic membrane, external ear and ear canal normal.   Nose: Mucosal edema present. No rhinorrhea. Right sinus exhibits maxillary sinus tenderness. Right sinus exhibits no frontal sinus tenderness. Left sinus exhibits no maxillary sinus tenderness and no frontal sinus tenderness.   Mouth/Throat: Uvula is midline, oropharynx is clear and moist and mucous membranes are normal.   Eyes:   PERRLA, EOMI, bilaterally.  No nystagmus.  Mild bilateral conjunctival injection.  No discharge.  Mild right-sided periorbital edema.  No periorbital erythema or ecchymosis.   Neck: Neck supple.   Pulmonary/Chest: Effort normal. No respiratory distress.   Neurological: She is alert and oriented to person, place, and time. No cranial nerve deficit or sensory deficit. GCS eye subscore is 4. GCS verbal subscore is 5. GCS motor subscore is 6.   Skin: Skin is warm and dry. Capillary refill takes less than 2 seconds.   Psychiatric: She has a normal mood and affect. Her speech is normal and behavior is normal. Judgment and thought content normal. Cognition and memory are normal.   Vitals reviewed.        Assessment/Plan:   1. Eustachian tube dysfunction, right  - fluticasone (FLONASE) 50 MCG/ACT nasal spray; Spray 2 Sprays in nose every day.  Dispense: 16 g; Refill: 0    2. Acute non-recurrent maxillary sinusitis  - fluticasone (FLONASE) 50 MCG/ACT nasal spray; Spray 2 Sprays in nose every day.  Dispense: 16 g; Refill: 0    3. Seasonal allergies    Start flonase as directed.  Nasal saline rinses 3 times a day.  May trial for 2-3 days Sudafed if symptoms persist.  Continue zyrtec.     If symptoms worsen or fail to improve patient instructed to return to clinic for reevaluation.      Differential diagnosis, natural history, supportive care, and indications for immediate follow-up discussed.

## 2019-04-24 ENCOUNTER — OFFICE VISIT (OUTPATIENT)
Dept: URGENT CARE | Facility: PHYSICIAN GROUP | Age: 69
End: 2019-04-24
Payer: COMMERCIAL

## 2019-04-24 VITALS
WEIGHT: 190 LBS | HEART RATE: 68 BPM | HEIGHT: 63 IN | BODY MASS INDEX: 33.66 KG/M2 | DIASTOLIC BLOOD PRESSURE: 70 MMHG | OXYGEN SATURATION: 97 % | SYSTOLIC BLOOD PRESSURE: 108 MMHG | TEMPERATURE: 99.6 F | RESPIRATION RATE: 14 BRPM

## 2019-04-24 DIAGNOSIS — J01.00 ACUTE NON-RECURRENT MAXILLARY SINUSITIS: ICD-10-CM

## 2019-04-24 PROCEDURE — 99214 OFFICE O/P EST MOD 30 MIN: CPT | Performed by: PHYSICIAN ASSISTANT

## 2019-04-24 RX ORDER — AMOXICILLIN AND CLAVULANATE POTASSIUM 875; 125 MG/1; MG/1
1 TABLET, FILM COATED ORAL 2 TIMES DAILY
Qty: 20 TAB | Refills: 0 | Status: SHIPPED | OUTPATIENT
Start: 2019-04-24 | End: 2019-05-04

## 2019-04-24 ASSESSMENT — ENCOUNTER SYMPTOMS
DIZZINESS: 0
CONSTIPATION: 0
NAUSEA: 0
HEADACHES: 0
EYE DISCHARGE: 0
VOMITING: 0
CHILLS: 0
FEVER: 0
SORE THROAT: 1
MYALGIAS: 0
ABDOMINAL PAIN: 0
EYE REDNESS: 0
DIARRHEA: 0
COUGH: 0
SHORTNESS OF BREATH: 0
RHINORRHEA: 0
EYE PAIN: 0
SINUS PAIN: 1

## 2019-04-24 NOTE — LETTER
April 24, 2019         Patient: Gigi Pagan   YOB: 1950   Date of Visit: 4/24/2019           To Whom it May Concern:    Gigi Pagan was seen in my clinic on 4/24/2019. Please excuse her from work from 4/24/19 through 4/25/19.    If you have any questions or concerns, please don't hesitate to call.        Sincerely,           Fernando Zaman P.A.-C.

## 2019-04-24 NOTE — PROGRESS NOTES
Subjective:   Gigi Pagan is a 68 y.o. female who presents for Sore Throat (sore throat, plugged/painful R ear)       Otalgia    There is pain in the right ear. This is a recurrent problem. The current episode started in the past 7 days. The problem occurs constantly. The problem has been gradually worsening. There has been no fever. The pain is moderate. Associated symptoms include a sore throat. Pertinent negatives include no abdominal pain, coughing, diarrhea, ear discharge, headaches, rash, rhinorrhea or vomiting. Treatments tried: flonase. The treatment provided no relief.     Review of Systems   Constitutional: Negative for chills and fever.   HENT: Positive for congestion, ear pain, sinus pain and sore throat. Negative for ear discharge and rhinorrhea.    Eyes: Negative for pain, discharge and redness.   Respiratory: Negative for cough and shortness of breath.    Cardiovascular: Negative for chest pain.   Gastrointestinal: Negative for abdominal pain, constipation, diarrhea, nausea and vomiting.   Musculoskeletal: Negative for myalgias.   Skin: Negative for rash.   Neurological: Negative for dizziness and headaches.   All other systems reviewed and are negative.      PMH:  has a past medical history of Anesthesia; Arthritis; ASTHMA; Backpain; Bronchitis; Cancer (HCC); Hypothyroidism; Indigestion; Jaundice; and Pneumonia. She also has no past medical history of Angina; Arrhythmia; CATARACT; Congestive heart failure (HCC); COPD; Diabetes; Dialysis; Glaucoma; Heart murmur; Heart valve disease; Hypertension; Infectious disease; Myocardial infarct (HCC); Other specified symptom associated with female genital organs; Pacemaker; Personal history of venous thrombosis and embolism; Psychiatric problem; Renal disorder; Rheumatic fever; Seizure (HCC); Stroke (HCC); Unspecified hemorrhagic conditions; or Unspecified urinary incontinence.    MEDS:   Current Outpatient Prescriptions:   •  amoxicillin-clavulanate  (AUGMENTIN) 875-125 MG Tab, Take 1 Tab by mouth 2 times a day for 10 days., Disp: 20 Tab, Rfl: 0  •  fluticasone (FLONASE) 50 MCG/ACT nasal spray, Spray 2 Sprays in nose every day., Disp: 16 g, Rfl: 0  •  albuterol 108 (90 Base) MCG/ACT Aero Soln inhalation aerosol, Inhale 2 Puffs by mouth every 6 hours as needed for Shortness of Breath., Disp: , Rfl:   •  acetaminophen (TYLENOL) 325 MG Tab, Take 650 mg by mouth every four hours as needed for Mild Pain., Disp: , Rfl:   •  SYNTHROID 75 MCG TABS, Take 75 mcg by mouth every day., Disp: , Rfl:     ALLERGIES:   Allergies   Allergen Reactions   • Gadolinium Anaphylaxis   • Tape Contact Dermatitis       SURGHX:   Past Surgical History:   Procedure Laterality Date   • PELVISCOPY  2011    Performed by KANDIS AARON at SURGERY [facility] ORS   • OMENTECTOMY  2011    Performed by KANDIS AARON at SURGERY [facility] ORS   • BIOPSY GENERAL  2011    Performed by KANDIS AARON at [facility] ORS   • OOPHORECTOMY  2011    Performed by KANDIS AARON at [facility] ORS   • ERCP IN OR  2010    Performed by JONATAN CURRY at [facility] ORS   • ERCP W/ INSERTION STENT/TUBE  3/29/2010    Performed by BUNNY BAHENA at [facility]   • ERCP W/SPHINCTEROTOMY/PAPILL.  3/29/2010    Performed by BUNNY BAHENA at [facility]   • PB RESEC LIVER,PART LOBECTOMY     • GYN SURGERY      partial hysterectomy   • OTHER      1970 tonsilectomy    • OTHER ORTHOPEDIC SURGERY       spinal fusion  minucus disk   • PB  DELIVERY ONLY      c section x 4       SOCHX:  reports that she has never smoked. She has never used smokeless tobacco. She reports that she drinks alcohol. She reports that she does not use drugs.    FH: Reviewed with patient, not pertinent to this visit.     Objective:   /70 (BP Location: Right arm, Patient Position: Sitting, BP Cuff Size: Small adult)   Pulse 68   Temp 37.6 °C (99.6  "°F) (Temporal)   Resp 14   Ht 1.6 m (5' 3\")   Wt 86.2 kg (190 lb)   SpO2 97%   BMI 33.66 kg/m²   Physical Exam   Constitutional: She is oriented to person, place, and time. She appears well-developed and well-nourished. No distress.   HENT:   Head: Normocephalic and atraumatic.   Right Ear: Tympanic membrane, external ear and ear canal normal.   Left Ear: Tympanic membrane, external ear and ear canal normal.   Nose: Mucosal edema present. Right sinus exhibits maxillary sinus tenderness. Right sinus exhibits no frontal sinus tenderness. Left sinus exhibits no maxillary sinus tenderness and no frontal sinus tenderness.   Mouth/Throat: Uvula is midline and mucous membranes are normal. Posterior oropharyngeal erythema present. No oropharyngeal exudate or posterior oropharyngeal edema.   Eyes: Pupils are equal, round, and reactive to light. Conjunctivae and EOM are normal.   Neck: Normal range of motion. Neck supple. No tracheal deviation present.   Cardiovascular: Normal rate, regular rhythm and normal heart sounds.    Pulmonary/Chest: Effort normal and breath sounds normal. No respiratory distress. She has no wheezes. She has no rhonchi. She has no rales.   Musculoskeletal:   ROM normal all four extremities   Lymphadenopathy:     She has no cervical adenopathy.   Neurological: She is alert and oriented to person, place, and time.   Skin: Skin is warm and dry.   Psychiatric: She has a normal mood and affect. Her behavior is normal. Judgment and thought content normal.   Vitals reviewed.      Assessment/Plan:   1. Acute non-recurrent maxillary sinusitis  - amoxicillin-clavulanate (AUGMENTIN) 875-125 MG Tab; Take 1 Tab by mouth 2 times a day for 10 days.  Dispense: 20 Tab; Refill: 0    - Advised to continue Fluticasone nasal spray  - Advised to try saline nasal flushes, warm fluids, saline gargles prn  - Advised to take abx with food/yogurt and to complete course  - Advised to return if symptoms worsen or do not " improve    Differential diagnosis, natural history, supportive care, and indications for immediate follow-up discussed.

## 2019-05-17 ENCOUNTER — HOSPITAL ENCOUNTER (OUTPATIENT)
Dept: RADIOLOGY | Facility: MEDICAL CENTER | Age: 69
End: 2019-05-17
Attending: NURSE PRACTITIONER
Payer: COMMERCIAL

## 2019-05-17 DIAGNOSIS — C22.1 MALIGNANT NEOPLASM OF INTRAHEPATIC BILE DUCTS (HCC): ICD-10-CM

## 2019-05-17 PROCEDURE — A9552 F18 FDG: HCPCS

## 2019-06-05 ENCOUNTER — HOSPITAL ENCOUNTER (OUTPATIENT)
Dept: LAB | Facility: MEDICAL CENTER | Age: 69
End: 2019-06-05
Attending: NURSE PRACTITIONER
Payer: COMMERCIAL

## 2019-06-05 LAB
ANION GAP SERPL CALC-SCNC: 10 MMOL/L (ref 0–11.9)
BUN SERPL-MCNC: 12 MG/DL (ref 8–22)
CALCIUM SERPL-MCNC: 9.6 MG/DL (ref 8.5–10.5)
CHLORIDE SERPL-SCNC: 103 MMOL/L (ref 96–112)
CO2 SERPL-SCNC: 25 MMOL/L (ref 20–33)
CREAT SERPL-MCNC: 0.65 MG/DL (ref 0.5–1.4)
FASTING STATUS PATIENT QL REPORTED: NORMAL
GLUCOSE SERPL-MCNC: 97 MG/DL (ref 65–99)
POTASSIUM SERPL-SCNC: 4.6 MMOL/L (ref 3.6–5.5)
SODIUM SERPL-SCNC: 138 MMOL/L (ref 135–145)

## 2019-06-05 PROCEDURE — 36415 COLL VENOUS BLD VENIPUNCTURE: CPT

## 2019-06-05 PROCEDURE — 80048 BASIC METABOLIC PNL TOTAL CA: CPT

## 2019-09-18 DIAGNOSIS — C22.1 CHOLANGIOCARCINOMA (HCC): ICD-10-CM

## 2019-09-18 RX ORDER — 0.9 % SODIUM CHLORIDE 0.9 %
VIAL (ML) INJECTION PRN
Status: CANCELLED | OUTPATIENT
Start: 2019-10-04

## 2019-09-18 RX ORDER — 0.9 % SODIUM CHLORIDE 0.9 %
VIAL (ML) INJECTION PRN
Status: CANCELLED | OUTPATIENT
Start: 2019-10-03

## 2019-09-18 RX ORDER — 0.9 % SODIUM CHLORIDE 0.9 %
5 VIAL (ML) INJECTION PRN
Status: CANCELLED | OUTPATIENT
Start: 2019-10-04

## 2019-09-18 RX ORDER — ONDANSETRON 2 MG/ML
4 INJECTION INTRAMUSCULAR; INTRAVENOUS PRN
Status: CANCELLED | OUTPATIENT
Start: 2019-10-04

## 2019-09-18 RX ORDER — 0.9 % SODIUM CHLORIDE 0.9 %
5 VIAL (ML) INJECTION PRN
Status: CANCELLED | OUTPATIENT
Start: 2019-10-03

## 2019-09-18 RX ORDER — PROCHLORPERAZINE MALEATE 10 MG
10 TABLET ORAL EVERY 6 HOURS PRN
Status: CANCELLED | OUTPATIENT
Start: 2019-10-04

## 2019-09-18 RX ORDER — SODIUM CHLORIDE 9 MG/ML
INJECTION, SOLUTION INTRAVENOUS CONTINUOUS
Status: CANCELLED | OUTPATIENT
Start: 2019-10-04

## 2019-09-18 RX ORDER — EPINEPHRINE 1 MG/ML(1)
0.5 AMPUL (ML) INJECTION PRN
Status: CANCELLED | OUTPATIENT
Start: 2019-10-04

## 2019-09-18 RX ORDER — DIPHENHYDRAMINE HYDROCHLORIDE 50 MG/ML
50 INJECTION INTRAMUSCULAR; INTRAVENOUS PRN
Status: CANCELLED | OUTPATIENT
Start: 2019-10-04

## 2019-09-18 RX ORDER — ONDANSETRON 8 MG/1
8 TABLET, ORALLY DISINTEGRATING ORAL PRN
Status: CANCELLED | OUTPATIENT
Start: 2019-10-04

## 2019-09-18 RX ORDER — METHYLPREDNISOLONE SODIUM SUCCINATE 125 MG/2ML
125 INJECTION, POWDER, LYOPHILIZED, FOR SOLUTION INTRAMUSCULAR; INTRAVENOUS PRN
Status: CANCELLED | OUTPATIENT
Start: 2019-10-04

## 2019-10-04 ENCOUNTER — OUTPATIENT INFUSION SERVICES (OUTPATIENT)
Dept: ONCOLOGY | Facility: MEDICAL CENTER | Age: 69
End: 2019-10-04
Attending: INTERNAL MEDICINE
Payer: COMMERCIAL

## 2019-10-04 VITALS
WEIGHT: 172.62 LBS | SYSTOLIC BLOOD PRESSURE: 126 MMHG | OXYGEN SATURATION: 98 % | RESPIRATION RATE: 18 BRPM | DIASTOLIC BLOOD PRESSURE: 63 MMHG | HEIGHT: 62 IN | BODY MASS INDEX: 31.77 KG/M2 | TEMPERATURE: 97.1 F | HEART RATE: 88 BPM

## 2019-10-04 DIAGNOSIS — C22.1 CHOLANGIOCARCINOMA (HCC): ICD-10-CM

## 2019-10-04 LAB
ALBUMIN SERPL BCP-MCNC: 4 G/DL (ref 3.2–4.9)
ALBUMIN/GLOB SERPL: 1.7 G/DL
ALP SERPL-CCNC: 56 U/L (ref 30–99)
ALT SERPL-CCNC: 7 U/L (ref 2–50)
ANION GAP SERPL CALC-SCNC: 7 MMOL/L (ref 0–11.9)
AST SERPL-CCNC: 18 U/L (ref 12–45)
BASOPHILS # BLD AUTO: 1.1 % (ref 0–1.8)
BASOPHILS # BLD: 0.05 K/UL (ref 0–0.12)
BILIRUB SERPL-MCNC: 0.4 MG/DL (ref 0.1–1.5)
BUN SERPL-MCNC: 14 MG/DL (ref 8–22)
CALCIUM SERPL-MCNC: 8.7 MG/DL (ref 8.5–10.5)
CHLORIDE SERPL-SCNC: 111 MMOL/L (ref 96–112)
CO2 SERPL-SCNC: 24 MMOL/L (ref 20–33)
COMMENT 1642: NORMAL
CREAT SERPL-MCNC: 0.62 MG/DL (ref 0.5–1.4)
EOSINOPHIL # BLD AUTO: 0.07 K/UL (ref 0–0.51)
EOSINOPHIL NFR BLD: 1.6 % (ref 0–6.9)
ERYTHROCYTE [DISTWIDTH] IN BLOOD BY AUTOMATED COUNT: 46.5 FL (ref 35.9–50)
GLOBULIN SER CALC-MCNC: 2.3 G/DL (ref 1.9–3.5)
GLUCOSE SERPL-MCNC: 77 MG/DL (ref 65–99)
HCT VFR BLD AUTO: 29.5 % (ref 37–47)
HGB BLD-MCNC: 8.7 G/DL (ref 12–16)
HYPOCHROMIA BLD QL SMEAR: ABNORMAL
IMM GRANULOCYTES # BLD AUTO: 0.01 K/UL (ref 0–0.11)
IMM GRANULOCYTES NFR BLD AUTO: 0.2 % (ref 0–0.9)
LYMPHOCYTES # BLD AUTO: 1.91 K/UL (ref 1–4.8)
LYMPHOCYTES NFR BLD: 43.2 % (ref 22–41)
MCH RBC QN AUTO: 26.6 PG (ref 27–33)
MCHC RBC AUTO-ENTMCNC: 29.5 G/DL (ref 33.6–35)
MCV RBC AUTO: 90.2 FL (ref 81.4–97.8)
MONOCYTES # BLD AUTO: 0.43 K/UL (ref 0–0.85)
MONOCYTES NFR BLD AUTO: 9.7 % (ref 0–13.4)
MORPHOLOGY BLD-IMP: NORMAL
NEUTROPHILS # BLD AUTO: 1.95 K/UL (ref 2–7.15)
NEUTROPHILS NFR BLD: 44.2 % (ref 44–72)
NRBC # BLD AUTO: 0 K/UL
NRBC BLD-RTO: 0 /100 WBC
OVALOCYTES BLD QL SMEAR: NORMAL
PLATELET # BLD AUTO: 201 K/UL (ref 164–446)
PLATELET BLD QL SMEAR: NORMAL
PMV BLD AUTO: 10.1 FL (ref 9–12.9)
POIKILOCYTOSIS BLD QL SMEAR: NORMAL
POTASSIUM SERPL-SCNC: 4 MMOL/L (ref 3.6–5.5)
PROT SERPL-MCNC: 6.3 G/DL (ref 6–8.2)
RBC # BLD AUTO: 3.27 M/UL (ref 4.2–5.4)
RBC BLD AUTO: PRESENT
SODIUM SERPL-SCNC: 142 MMOL/L (ref 135–145)
T4 FREE SERPL-MCNC: 1.09 NG/DL (ref 0.53–1.43)
TSH SERPL DL<=0.005 MIU/L-ACNC: 0.38 UIU/ML (ref 0.38–5.33)
WBC # BLD AUTO: 4.4 K/UL (ref 4.8–10.8)

## 2019-10-04 PROCEDURE — 96413 CHEMO IV INFUSION 1 HR: CPT

## 2019-10-04 PROCEDURE — 85025 COMPLETE CBC W/AUTO DIFF WBC: CPT

## 2019-10-04 PROCEDURE — 700111 HCHG RX REV CODE 636 W/ 250 OVERRIDE (IP): Performed by: INTERNAL MEDICINE

## 2019-10-04 PROCEDURE — 700105 HCHG RX REV CODE 258: Performed by: INTERNAL MEDICINE

## 2019-10-04 PROCEDURE — 80053 COMPREHEN METABOLIC PANEL: CPT

## 2019-10-04 PROCEDURE — 82024 ASSAY OF ACTH: CPT

## 2019-10-04 PROCEDURE — 84439 ASSAY OF FREE THYROXINE: CPT

## 2019-10-04 PROCEDURE — 84443 ASSAY THYROID STIM HORMONE: CPT

## 2019-10-04 RX ORDER — SODIUM CHLORIDE 9 MG/ML
INJECTION, SOLUTION INTRAVENOUS CONTINUOUS
Status: DISCONTINUED | OUTPATIENT
Start: 2019-10-04 | End: 2019-10-04 | Stop reason: HOSPADM

## 2019-10-04 RX ORDER — TRAMADOL HYDROCHLORIDE 50 MG/1
50 TABLET ORAL EVERY 4 HOURS PRN
COMMUNITY
End: 2019-11-15

## 2019-10-04 RX ADMIN — SODIUM CHLORIDE 200 MG: 9 INJECTION, SOLUTION INTRAVENOUS at 17:54

## 2019-10-04 RX ADMIN — SODIUM CHLORIDE: 9 INJECTION, SOLUTION INTRAVENOUS at 17:56

## 2019-10-04 NOTE — PROGRESS NOTES
"Pharmacy Chemotherapy Calculation    Dx: cholangiocarcinoma         Protocol: Keytruda     *Dosing Reference*  Pembrolizumab 200 mg IV over 30 min on Day 1  21-day cycle until disease progression or unacceptable toxicity  NCCN Guidelines for Hepatobiliary Cancers V.1.2019  Disha REID, et al. J Clin Oncol. 2017:35(15_suppl):[id number]  Yg ALVAREZ, et al. Eur J Cancer. 2015;51(3_suppl):S112    Allergies:  Gadolinium and Tape     /63   Pulse 88   Temp 36.2 °C (97.1 °F) (Temporal)   Resp 18   Ht 1.585 m (5' 2.4\") Comment: no shoes  Wt 78.3 kg (172 lb 9.9 oz)   SpO2 98%   BMI 31.17 kg/m²  Body surface area is 1.86 meters squared.    All labs (10/4/19) and thyroid panel within treatment plan parameters.       Drug Order   (Drug name, dose, route, IV Fluid & volume, frequency, number of doses) Cycle 1, Day 1      Previous treatment: none     Medication = Pembrolizumab (Keytruda)  Base Dose = 200 mg fixed dose  Calc Dose: no calculation required  Final Dose = 200 mg  Route = IV  Fluid & Volume = NS 50 mL  Admin Duration = Over 30 min          <10% difference, okay to treat with final dose     By my signature below, I confirm this process was performed independently with the BSA and all final chemotherapy dosing calculations congruent. I have reviewed the above chemotherapy order and that my calculation of the final dose and BSA (when applicable) corroborate those calculations of the  pharmacist. Discrepancies of 5% or greater in the written dose have been addressed and documented within the [facility] Progress notes.      Mary Casey, PharmD  "

## 2019-10-04 NOTE — PROGRESS NOTES
"Pharmacy Chemotherapy Calculations Note:    Dx: Cholangiocarcinoma  Cycle:  1 Previous treatment: n/a     Protocol: Keytruda  Pembrolizumab (Keytruda) 200 mg IV on Day 1  21-day cycle until disease progression or unacceptable toxicity  NCCN Guidelines for Hepatobiliary Cancers. V.1.2019.  Disha REID, et al. JCO. 2017;35(15_suppl):abstr 2512.  Bang YJ, et al. Eur J Cancer. 2015;51(3_suppl):S112.         /63   Pulse 88   Temp 36.2 °C (97.1 °F) (Temporal)   Resp 18   Ht 1.585 m (5' 2.4\") Comment: no shoes  Wt 78.3 kg (172 lb 9.9 oz)   SpO2 98%   BMI 31.17 kg/m²  Body surface area is 1.86 meters squared.    Labs from 10/4/19 reviewed - all within treatment parameters, TSH/T4 WNL       Pembrolizumab 200 mg fixed dose   No calc req'd, ok for final dose = 200 mg IV      Dorota Hernandez, PharmD, BCOP             "

## 2019-10-05 NOTE — PROGRESS NOTES
Chemotherapy Verification - PRIMARY RN      Height = 158.5 cm  Weight = 78.3 kg  BSA = 1.86 m^2       Medication: Keytruda  Dose: SET DOSE  Calculated Dose: 200 mg                             (In mg/m2, AUC, mg/kg)       I confirm this process was performed independently with the BSA and all final chemotherapy dosing calculations congruent.  Any discrepancies of 10% or greater have been addressed with the chemotherapy pharmacist. The resolution of the discrepancy has been documented in the [facility] progress notes.

## 2019-10-05 NOTE — PROGRESS NOTES
Labs resulted. Hemoglobin is within treatment parameters at 8.7. Patient advised if she becomes symptomatic to let her MD know and if symptoms are severe to go to ER. Agrees with this plan. Denies any dark stools. States they are somewhat tarry but not dark. Denies any sob, dizziness, chest pain, or weakness. Other labs WNL. Chart to pharmacy. Awaiting medications from pharmacy.

## 2019-10-05 NOTE — PROGRESS NOTES
Patient here today for first time dose of Keytruda. Education given. All questions answered. PIV placed with brisk blood return. Labs sent. Awaiting lab results at this time. Continue to monitor.

## 2019-10-05 NOTE — PROGRESS NOTES
Tolerated Keytruda with no reactions. PIV flushed and removed with tip intact. Site covered with pressure dressing. Discharged home to self care in no distress at this time. Next appointment in place. This RN contacted Dr. Pinto via epic email about possibly following up on labs.

## 2019-10-05 NOTE — PROGRESS NOTES
Chemotherapy Verification - PRIMARY RN      Height = 158.5cm  Weight = 78.3kg  BSA = 1.86m2       Medication: Keytruda  Dose: 200mg fixed  Calculated Dose: 200mg fixed                             (In mg/m2, AUC, mg/kg)       I confirm this process was performed independently with the BSA and all final chemotherapy dosing calculations congruent.  Any discrepancies of 10% or greater have been addressed with the chemotherapy pharmacist. The resolution of the discrepancy has been documented in the EP[facility] progress notes.

## 2019-10-06 LAB — ACTH PLAS-MCNC: 5 PG/ML (ref 7.2–63.3)

## 2019-10-07 ENCOUNTER — PATIENT OUTREACH (OUTPATIENT)
Dept: OTHER | Facility: MEDICAL CENTER | Age: 69
End: 2019-10-07

## 2019-10-07 NOTE — PROGRESS NOTES
On 10/07/2019, Social Work Intern Melanie Mcneal sent a letter out to patient about the guidance, resources and support that can be provided by the Oncology Outpatient Social Work. Referral stated patient was a 10 on distress due to work problems.

## 2019-10-08 ENCOUNTER — PATIENT OUTREACH (OUTPATIENT)
Dept: OTHER | Facility: MEDICAL CENTER | Age: 69
End: 2019-10-08

## 2019-10-08 NOTE — PROGRESS NOTES
On 10/8/2019 at 0950 this ONN called patient. No answer, left voicemail regarding referral, encouraged patient to call back.

## 2019-10-11 ENCOUNTER — TELEPHONE (OUTPATIENT)
Dept: HEMATOLOGY ONCOLOGY | Facility: MEDICAL CENTER | Age: 69
End: 2019-10-11

## 2019-10-11 NOTE — TELEPHONE ENCOUNTER
Nutrition Services: New Chemo patient  RD unable to see patient on new chemo start day last week, 10/4. I called patient today to offer nutrition services however she did not answer.  I left a message for her and requested she call back as needed.    RD will attempt to follow-up  [phone]

## 2019-10-23 RX ORDER — 0.9 % SODIUM CHLORIDE 0.9 %
VIAL (ML) INJECTION PRN
Status: CANCELLED | OUTPATIENT
Start: 2019-10-25

## 2019-10-23 RX ORDER — 0.9 % SODIUM CHLORIDE 0.9 %
5 VIAL (ML) INJECTION PRN
Status: CANCELLED | OUTPATIENT
Start: 2019-10-24

## 2019-10-23 RX ORDER — ONDANSETRON 8 MG/1
8 TABLET, ORALLY DISINTEGRATING ORAL PRN
Status: CANCELLED | OUTPATIENT
Start: 2019-10-25

## 2019-10-23 RX ORDER — DIPHENHYDRAMINE HYDROCHLORIDE 50 MG/ML
50 INJECTION INTRAMUSCULAR; INTRAVENOUS PRN
Status: CANCELLED | OUTPATIENT
Start: 2019-10-25

## 2019-10-23 RX ORDER — METHYLPREDNISOLONE SODIUM SUCCINATE 125 MG/2ML
125 INJECTION, POWDER, LYOPHILIZED, FOR SOLUTION INTRAMUSCULAR; INTRAVENOUS PRN
Status: CANCELLED | OUTPATIENT
Start: 2019-10-25

## 2019-10-23 RX ORDER — 0.9 % SODIUM CHLORIDE 0.9 %
VIAL (ML) INJECTION PRN
Status: CANCELLED | OUTPATIENT
Start: 2019-10-24

## 2019-10-23 RX ORDER — SODIUM CHLORIDE 9 MG/ML
INJECTION, SOLUTION INTRAVENOUS CONTINUOUS
Status: CANCELLED | OUTPATIENT
Start: 2019-10-25

## 2019-10-23 RX ORDER — 0.9 % SODIUM CHLORIDE 0.9 %
5 VIAL (ML) INJECTION PRN
Status: CANCELLED | OUTPATIENT
Start: 2019-10-25

## 2019-10-23 RX ORDER — ONDANSETRON 2 MG/ML
4 INJECTION INTRAMUSCULAR; INTRAVENOUS PRN
Status: CANCELLED | OUTPATIENT
Start: 2019-10-25

## 2019-10-23 RX ORDER — EPINEPHRINE 1 MG/ML(1)
0.5 AMPUL (ML) INJECTION PRN
Status: CANCELLED | OUTPATIENT
Start: 2019-10-25

## 2019-10-23 RX ORDER — PROCHLORPERAZINE MALEATE 10 MG
10 TABLET ORAL EVERY 6 HOURS PRN
Status: CANCELLED | OUTPATIENT
Start: 2019-10-25

## 2019-10-25 ENCOUNTER — OUTPATIENT INFUSION SERVICES (OUTPATIENT)
Dept: ONCOLOGY | Facility: MEDICAL CENTER | Age: 69
End: 2019-10-25
Attending: INTERNAL MEDICINE
Payer: COMMERCIAL

## 2019-10-25 VITALS
RESPIRATION RATE: 18 BRPM | OXYGEN SATURATION: 96 % | BODY MASS INDEX: 31.09 KG/M2 | DIASTOLIC BLOOD PRESSURE: 61 MMHG | SYSTOLIC BLOOD PRESSURE: 130 MMHG | HEART RATE: 101 BPM | HEIGHT: 63 IN | WEIGHT: 175.49 LBS | TEMPERATURE: 99 F

## 2019-10-25 DIAGNOSIS — C22.1 CHOLANGIOCARCINOMA (HCC): ICD-10-CM

## 2019-10-25 LAB
ALBUMIN SERPL BCP-MCNC: 3.6 G/DL (ref 3.2–4.9)
ALBUMIN/GLOB SERPL: 1.4 G/DL
ALP SERPL-CCNC: 61 U/L (ref 30–99)
ALT SERPL-CCNC: 5 U/L (ref 2–50)
ANION GAP SERPL CALC-SCNC: 4 MMOL/L (ref 0–11.9)
ANISOCYTOSIS BLD QL SMEAR: ABNORMAL
AST SERPL-CCNC: 17 U/L (ref 12–45)
BASOPHILS # BLD AUTO: 1.4 % (ref 0–1.8)
BASOPHILS # BLD: 0.05 K/UL (ref 0–0.12)
BILIRUB SERPL-MCNC: 0.4 MG/DL (ref 0.1–1.5)
BUN SERPL-MCNC: 12 MG/DL (ref 8–22)
CALCIUM SERPL-MCNC: 9 MG/DL (ref 8.5–10.5)
CHLORIDE SERPL-SCNC: 111 MMOL/L (ref 96–112)
CO2 SERPL-SCNC: 25 MMOL/L (ref 20–33)
COMMENT 1642: NORMAL
CREAT SERPL-MCNC: 0.72 MG/DL (ref 0.5–1.4)
EOSINOPHIL # BLD AUTO: 0.09 K/UL (ref 0–0.51)
EOSINOPHIL NFR BLD: 2.5 % (ref 0–6.9)
ERYTHROCYTE [DISTWIDTH] IN BLOOD BY AUTOMATED COUNT: 50.6 FL (ref 35.9–50)
FERRITIN SERPL-MCNC: 4.9 NG/ML (ref 10–291)
GLOBULIN SER CALC-MCNC: 2.5 G/DL (ref 1.9–3.5)
GLUCOSE SERPL-MCNC: 102 MG/DL (ref 65–99)
HCT VFR BLD AUTO: 28.1 % (ref 37–47)
HGB BLD-MCNC: 8.2 G/DL (ref 12–16)
HYPOCHROMIA BLD QL SMEAR: ABNORMAL
IMM GRANULOCYTES # BLD AUTO: 0.01 K/UL (ref 0–0.11)
IMM GRANULOCYTES NFR BLD AUTO: 0.3 % (ref 0–0.9)
LYMPHOCYTES # BLD AUTO: 1.57 K/UL (ref 1–4.8)
LYMPHOCYTES NFR BLD: 42.9 % (ref 22–41)
MCH RBC QN AUTO: 24.6 PG (ref 27–33)
MCHC RBC AUTO-ENTMCNC: 29.2 G/DL (ref 33.6–35)
MCV RBC AUTO: 84.1 FL (ref 81.4–97.8)
MICROCYTES BLD QL SMEAR: ABNORMAL
MONOCYTES # BLD AUTO: 0.29 K/UL (ref 0–0.85)
MONOCYTES NFR BLD AUTO: 7.9 % (ref 0–13.4)
MORPHOLOGY BLD-IMP: NORMAL
NEUTROPHILS # BLD AUTO: 1.65 K/UL (ref 2–7.15)
NEUTROPHILS NFR BLD: 45 % (ref 44–72)
NRBC # BLD AUTO: 0 K/UL
NRBC BLD-RTO: 0 /100 WBC
OVALOCYTES BLD QL SMEAR: NORMAL
PLATELET # BLD AUTO: 204 K/UL (ref 164–446)
PLATELET BLD QL SMEAR: NORMAL
PMV BLD AUTO: 9.8 FL (ref 9–12.9)
POIKILOCYTOSIS BLD QL SMEAR: NORMAL
POTASSIUM SERPL-SCNC: 3.8 MMOL/L (ref 3.6–5.5)
PROT SERPL-MCNC: 6.1 G/DL (ref 6–8.2)
RBC # BLD AUTO: 3.34 M/UL (ref 4.2–5.4)
RBC BLD AUTO: PRESENT
SODIUM SERPL-SCNC: 140 MMOL/L (ref 135–145)
T4 FREE SERPL-MCNC: 0.91 NG/DL (ref 0.53–1.43)
TSH SERPL DL<=0.005 MIU/L-ACNC: 0.5 UIU/ML (ref 0.38–5.33)
WBC # BLD AUTO: 3.7 K/UL (ref 4.8–10.8)

## 2019-10-25 PROCEDURE — 700111 HCHG RX REV CODE 636 W/ 250 OVERRIDE (IP): Performed by: INTERNAL MEDICINE

## 2019-10-25 PROCEDURE — 700105 HCHG RX REV CODE 258: Performed by: INTERNAL MEDICINE

## 2019-10-25 PROCEDURE — 82728 ASSAY OF FERRITIN: CPT

## 2019-10-25 PROCEDURE — 85025 COMPLETE CBC W/AUTO DIFF WBC: CPT

## 2019-10-25 PROCEDURE — 84439 ASSAY OF FREE THYROXINE: CPT

## 2019-10-25 PROCEDURE — 80053 COMPREHEN METABOLIC PANEL: CPT

## 2019-10-25 PROCEDURE — 84443 ASSAY THYROID STIM HORMONE: CPT

## 2019-10-25 PROCEDURE — 96413 CHEMO IV INFUSION 1 HR: CPT

## 2019-10-25 PROCEDURE — 82024 ASSAY OF ACTH: CPT

## 2019-10-25 RX ADMIN — SODIUM CHLORIDE 200 MG: 9 INJECTION, SOLUTION INTRAVENOUS at 17:01

## 2019-10-25 NOTE — PROGRESS NOTES
"Pharmacy Chemotherapy Calculations Note:    Dx: Cholangiocarcinoma  Cycle:  2 Previous treatment: 10/4/19     Protocol: Keytruda  Pembrolizumab (Keytruda) 200 mg IV on Day 1  21-day cycle until disease progression or unacceptable toxicity  NCCN Guidelines for Hepatobiliary Cancers. V.1.2019.  Disha RIED et al. JCO. 2017;35(15_suppl):abstr 2512.  Yg YJ, et al. Eur J Cancer. 2015;51(3_suppl):S112.         /61   Pulse (!) 101   Temp 37.2 °C (99 °F) (Temporal)   Resp 18   Ht 1.59 m (5' 2.6\")   Wt 79.6 kg (175 lb 7.8 oz)   SpO2 96%   BMI 31.49 kg/m²  Body surface area is 1.88 meters squared.     Labs from 10/25/19 reviewed - all within treatment parameters, TSH/T4 pending.     Pembrolizumab 200 mg fixed dose   No calc req'd, ok for final dose = 200 mg IV    Skyler Steele, PharmD    "

## 2019-10-25 NOTE — PROGRESS NOTES
"Pharmacy Chemotherapy Calculation    Dx: cholangiocarcinoma         Protocol: Keytruda     *Dosing Reference*  Pembrolizumab 200 mg IV over 30 min on Day 1  21-day cycle until disease progression or unacceptable toxicity  NCCN Guidelines for Hepatobiliary Cancers V.1.2019  Disha REID, et al. J Clin Oncol. 2017:35(15_suppl):[id number]  Yg ALVAREZ, et al. Eur J Cancer. 2015;51(3_suppl):S112    Allergies:  Gadolinium and Tape     /61   Pulse (!) 101   Temp 37.2 °C (99 °F) (Temporal)   Resp 18   Ht 1.59 m (5' 2.6\")   Wt 79.6 kg (175 lb 7.8 oz)   SpO2 96%   BMI 31.49 kg/m²  Body surface area is 1.88 meters squared.    Labs 10/25/19  ANC~ 1650 Plt = 204k   Hgb = 8.2     SCr = 0.72 mg/dL CrCl ~ 95 mL/min   LFT's = WNLs  TBili = 0.4   TSH = pending Free T4 = pending     Drug Order   (Drug name, dose, route, IV Fluid & volume, frequency, number of doses) Cycle 2  Previous treatment: C1 on 10/4/19     Medication = Pembrolizumab (Keytruda)  Base Dose = 200 mg fixed dose  Calc Dose: no calculation required  Final Dose = 200 mg  Route = IV  Fluid & Volume = NS 50 mL  Admin Duration = Over 30 min          <10% difference, okay to treat with final dose     By my signature below, I confirm this process was performed independently with the BSA and all final chemotherapy dosing calculations congruent. I have reviewed the above chemotherapy order and that my calculation of the final dose and BSA (when applicable) corroborate those calculations of the  pharmacist. Discrepancies of 10% or greater in the written dose have been addressed and documented within the [facility] Progress notes.    Jacky Cardona, PharmD  "

## 2019-10-25 NOTE — PROGRESS NOTES
Chemotherapy Verification - PRIMARY RN      Height = 159 cm  Weight = 79.6 kg  BSA = 1.88 m2       Medication: Keytruda  Dose: Set dose 200 mg  Calculated Dose: (set dose, no calculation needed) 200 mg                              (In mg/m2, AUC, mg/kg)     I confirm this process was performed independently with the BSA and all final chemotherapy dosing calculations congruent.  Any discrepancies of 10% or greater have been addressed with the chemotherapy pharmacist. The resolution of the discrepancy has been documented in the EP[facility] progress notes.

## 2019-10-25 NOTE — PROGRESS NOTES
Chemotherapy Verification - SECONDARY RN       Height = 159xm  Weight = 79.6kg  BSA = 1.88m2       Medication: Keytruda  Dose: set dose  Calculated Dose: 200mg                             (In mg/m2, AUC, mg/kg)       I confirm that this process was performed independently.

## 2019-10-26 NOTE — PROGRESS NOTES
Pt arrives to [facility] for C2 of Keytruda infusion. Pt denies any s/sx of infection.  PIV established to L hand and labs drawn via PIV.  Labs reviewed and pt meets parameters for treatment.  Keytruda infused as ordered without adverse effects.  PIV flushed and site removed.  Gave pt a copy of schedule and discussed plan of care.  Pt will have labs drawn at [facility] the day before treatment.  Pt dc home with daughter.

## 2019-10-27 LAB — ACTH PLAS-MCNC: 5.6 PG/ML (ref 7.2–63.3)

## 2019-11-08 ENCOUNTER — OFFICE VISIT (OUTPATIENT)
Dept: URGENT CARE | Facility: PHYSICIAN GROUP | Age: 69
End: 2019-11-08
Payer: COMMERCIAL

## 2019-11-08 VITALS
DIASTOLIC BLOOD PRESSURE: 70 MMHG | SYSTOLIC BLOOD PRESSURE: 110 MMHG | RESPIRATION RATE: 16 BRPM | TEMPERATURE: 98.2 F | OXYGEN SATURATION: 100 % | HEART RATE: 88 BPM

## 2019-11-08 DIAGNOSIS — D70.1 CHEMOTHERAPY-INDUCED NEUTROPENIA (HCC): ICD-10-CM

## 2019-11-08 DIAGNOSIS — L08.9 SKIN INFECTION: ICD-10-CM

## 2019-11-08 DIAGNOSIS — T45.1X5A CHEMOTHERAPY-INDUCED NEUTROPENIA (HCC): ICD-10-CM

## 2019-11-08 PROCEDURE — 99214 OFFICE O/P EST MOD 30 MIN: CPT | Performed by: PHYSICIAN ASSISTANT

## 2019-11-08 RX ORDER — DOXYCYCLINE HYCLATE 100 MG
100 TABLET ORAL 2 TIMES DAILY
Qty: 10 TAB | Refills: 0 | Status: SHIPPED | OUTPATIENT
Start: 2019-11-08 | End: 2019-11-13

## 2019-11-08 RX ORDER — DOXYCYCLINE HYCLATE 100 MG
100 TABLET ORAL 2 TIMES DAILY
Qty: 10 TAB | Refills: 0 | Status: SHIPPED | OUTPATIENT
Start: 2019-11-08 | End: 2019-11-08

## 2019-11-08 ASSESSMENT — ENCOUNTER SYMPTOMS
ROS SKIN COMMENTS: RLE WOUND
COUGH: 0
MYALGIAS: 0
SENSORY CHANGE: 0
BRUISES/BLEEDS EASILY: 0
FEVER: 0
TINGLING: 0
VOMITING: 0
HEADACHES: 0
SHORTNESS OF BREATH: 0
NAUSEA: 0
CHILLS: 0

## 2019-11-08 NOTE — PROGRESS NOTES
"Subjective:      Gigi Pagan is a 69 y.o. female who presents with Leg Pain (right leg has a sore that is not healing, x 2 weeks)            HPI  69-year-old female presents urgent care with new problem of sore on right lower extremity secondary to inadvertently hitting metal siding of door while at work.  Patient denies any other injuries.  She said that this sore has been taking longer than normal to heal.  Patient reports she is having low-grade fevers, however she has not taken her temperature at home.  She has not taken any Tylenol or ibuprofen.  She denies nausea or vomiting.  Mild associated pain.  She has history of recently diagnosed cholangiocarcinoma and has infusion of Keytruda every 3 weeks.  Her last chemotherapy was 2 weeks ago.  Blood work done on 10/25/2019 shows white blood cell count is 3.7.     Review of Systems   Constitutional: Negative for chills, fever and malaise/fatigue.   Respiratory: Negative for cough and shortness of breath.    Cardiovascular: Negative for chest pain.   Gastrointestinal: Negative for nausea and vomiting.   Musculoskeletal: Negative for myalgias.   Skin: Negative for itching and rash.        RLE wound   Neurological: Negative for tingling, sensory change and headaches.   Endo/Heme/Allergies: Does not bruise/bleed easily.       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     • tramadol (ULTRAM) 50 MG Tab Take 50 mg by mouth every four hours as needed.     • fluticasone (FLONASE) 50 " MCG/ACT nasal spray Spray 2 Sprays in nose every day. 16 g 0   • albuterol 108 (90 Base) MCG/ACT Aero Soln inhalation aerosol Inhale 2 Puffs by mouth every 6 hours as needed for Shortness of Breath.     • SYNTHROID 75 MCG TABS Take 75 mcg by mouth every day.     • acetaminophen (TYLENOL) 325 MG Tab Take 650 mg by mouth every four hours as needed for Mild Pain.       No current facility-administered medications on file prior to visit.      Allergies   Allergen Reactions   • Gadolinium Anaphylaxis   • Tape Contact Dermatitis     Social History     Tobacco Use   • Smoking status: Never Smoker   • Smokeless tobacco: Never Used   Substance Use Topics   • Alcohol use: Yes      Objective:     /70 (BP Location: Left arm, Patient Position: Sitting, BP Cuff Size: Large adult)   Pulse 88   Temp 36.8 °C (98.2 °F) (Temporal)   Resp 16   SpO2 100%      Physical Exam  Vitals signs reviewed.   Constitutional:       General: She is not in acute distress.     Appearance: Normal appearance. She is well-developed and normal weight.   HENT:      Head: Normocephalic and atraumatic.      Mouth/Throat:      Mouth: Mucous membranes are moist.      Pharynx: Oropharynx is clear.   Eyes:      Extraocular Movements: Extraocular movements intact.      Conjunctiva/sclera: Conjunctivae normal.   Neck:      Musculoskeletal: Normal range of motion and neck supple.   Cardiovascular:      Rate and Rhythm: Normal rate and regular rhythm.   Pulmonary:      Effort: Pulmonary effort is normal. No respiratory distress.   Musculoskeletal: Normal range of motion.   Skin:     Capillary Refill: Capillary refill takes less than 2 seconds.          Neurological:      Mental Status: She is alert and oriented to person, place, and time.   Psychiatric:         Mood and Affect: Mood normal.         Behavior: Behavior normal.         Thought Content: Thought content normal.         Judgment: Judgment normal.                 Assessment/Plan:     1. Skin  infection  doxycycline (VIBRAMYCIN) 100 MG Tab   2. Chemotherapy-induced neutropenia (HCC)       Patient given 7-day course of doxycycline secondary to nonhealing wound over right shin.  She has a history of cholangio-carcinoma and is on chemotherapy.  Positive neutropenia seen on lab work done 10/25/2019.  Patient given strict ER precautions for any development of fever or worsening of symptoms.  Follow-up with PCP and/or oncology.  Patient verbalized understanding of treatment plan and has no further questions regarding care.

## 2019-11-14 ENCOUNTER — OUTPATIENT INFUSION SERVICES (OUTPATIENT)
Dept: ONCOLOGY | Facility: MEDICAL CENTER | Age: 69
End: 2019-11-14
Attending: INTERNAL MEDICINE
Payer: COMMERCIAL

## 2019-11-14 VITALS
TEMPERATURE: 98 F | WEIGHT: 178.57 LBS | HEIGHT: 63 IN | RESPIRATION RATE: 18 BRPM | SYSTOLIC BLOOD PRESSURE: 120 MMHG | BODY MASS INDEX: 31.64 KG/M2 | HEART RATE: 80 BPM | OXYGEN SATURATION: 97 % | DIASTOLIC BLOOD PRESSURE: 62 MMHG

## 2019-11-14 DIAGNOSIS — C22.1 CHOLANGIOCARCINOMA (HCC): ICD-10-CM

## 2019-11-14 LAB
ALBUMIN SERPL BCP-MCNC: 3.9 G/DL (ref 3.2–4.9)
ALBUMIN/GLOB SERPL: 2 G/DL
ALP SERPL-CCNC: 61 U/L (ref 30–99)
ALT SERPL-CCNC: 6 U/L (ref 2–50)
ANION GAP SERPL CALC-SCNC: 5 MMOL/L (ref 0–11.9)
ANISOCYTOSIS BLD QL SMEAR: ABNORMAL
AST SERPL-CCNC: 14 U/L (ref 12–45)
BASOPHILS # BLD AUTO: 1 % (ref 0–1.8)
BASOPHILS # BLD: 0.04 K/UL (ref 0–0.12)
BILIRUB SERPL-MCNC: 0.3 MG/DL (ref 0.1–1.5)
BUN SERPL-MCNC: 16 MG/DL (ref 8–22)
CALCIUM SERPL-MCNC: 8.6 MG/DL (ref 8.5–10.5)
CHLORIDE SERPL-SCNC: 108 MMOL/L (ref 96–112)
CO2 SERPL-SCNC: 25 MMOL/L (ref 20–33)
COMMENT 1642: NORMAL
CREAT SERPL-MCNC: 0.57 MG/DL (ref 0.5–1.4)
EOSINOPHIL # BLD AUTO: 0.16 K/UL (ref 0–0.51)
EOSINOPHIL NFR BLD: 4.1 % (ref 0–6.9)
ERYTHROCYTE [DISTWIDTH] IN BLOOD BY AUTOMATED COUNT: 52.5 FL (ref 35.9–50)
GLOBULIN SER CALC-MCNC: 2 G/DL (ref 1.9–3.5)
GLUCOSE SERPL-MCNC: 91 MG/DL (ref 65–99)
HCT VFR BLD AUTO: 29 % (ref 37–47)
HGB BLD-MCNC: 8.3 G/DL (ref 12–16)
HYPOCHROMIA BLD QL SMEAR: ABNORMAL
IMM GRANULOCYTES # BLD AUTO: 0 K/UL (ref 0–0.11)
IMM GRANULOCYTES NFR BLD AUTO: 0 % (ref 0–0.9)
LYMPHOCYTES # BLD AUTO: 1.89 K/UL (ref 1–4.8)
LYMPHOCYTES NFR BLD: 48.2 % (ref 22–41)
MCH RBC QN AUTO: 22.7 PG (ref 27–33)
MCHC RBC AUTO-ENTMCNC: 28.6 G/DL (ref 33.6–35)
MCV RBC AUTO: 79.5 FL (ref 81.4–97.8)
MICROCYTES BLD QL SMEAR: ABNORMAL
MONOCYTES # BLD AUTO: 0.37 K/UL (ref 0–0.85)
MONOCYTES NFR BLD AUTO: 9.4 % (ref 0–13.4)
MORPHOLOGY BLD-IMP: NORMAL
NEUTROPHILS # BLD AUTO: 1.46 K/UL (ref 2–7.15)
NEUTROPHILS NFR BLD: 37.3 % (ref 44–72)
NRBC # BLD AUTO: 0 K/UL
NRBC BLD-RTO: 0 /100 WBC
PLATELET # BLD AUTO: 221 K/UL (ref 164–446)
PLATELET BLD QL SMEAR: NORMAL
PMV BLD AUTO: 9.4 FL (ref 9–12.9)
POLYCHROMASIA BLD QL SMEAR: NORMAL
POTASSIUM SERPL-SCNC: 4 MMOL/L (ref 3.6–5.5)
PROT SERPL-MCNC: 5.9 G/DL (ref 6–8.2)
RBC # BLD AUTO: 3.65 M/UL (ref 4.2–5.4)
RBC BLD AUTO: PRESENT
ROULEAUX BLD QL SMEAR: NORMAL
SODIUM SERPL-SCNC: 138 MMOL/L (ref 135–145)
T4 FREE SERPL-MCNC: 0.89 NG/DL (ref 0.53–1.43)
TSH SERPL DL<=0.005 MIU/L-ACNC: 1.84 UIU/ML (ref 0.38–5.33)
WBC # BLD AUTO: 3.9 K/UL (ref 4.8–10.8)

## 2019-11-14 PROCEDURE — 84439 ASSAY OF FREE THYROXINE: CPT

## 2019-11-14 PROCEDURE — 85025 COMPLETE CBC W/AUTO DIFF WBC: CPT

## 2019-11-14 PROCEDURE — 82024 ASSAY OF ACTH: CPT

## 2019-11-14 PROCEDURE — 36415 COLL VENOUS BLD VENIPUNCTURE: CPT

## 2019-11-14 PROCEDURE — 80053 COMPREHEN METABOLIC PANEL: CPT

## 2019-11-14 PROCEDURE — 84443 ASSAY THYROID STIM HORMONE: CPT

## 2019-11-14 RX ORDER — EPINEPHRINE 1 MG/ML(1)
0.5 AMPUL (ML) INJECTION PRN
Status: CANCELLED | OUTPATIENT
Start: 2019-11-15

## 2019-11-14 RX ORDER — 0.9 % SODIUM CHLORIDE 0.9 %
5 VIAL (ML) INJECTION PRN
Status: CANCELLED | OUTPATIENT
Start: 2019-11-15

## 2019-11-14 RX ORDER — SODIUM CHLORIDE 9 MG/ML
INJECTION, SOLUTION INTRAVENOUS CONTINUOUS
Status: CANCELLED | OUTPATIENT
Start: 2019-11-15

## 2019-11-14 RX ORDER — 0.9 % SODIUM CHLORIDE 0.9 %
VIAL (ML) INJECTION PRN
Status: CANCELLED | OUTPATIENT
Start: 2019-11-15

## 2019-11-14 RX ORDER — DIPHENHYDRAMINE HYDROCHLORIDE 50 MG/ML
50 INJECTION INTRAMUSCULAR; INTRAVENOUS PRN
Status: CANCELLED | OUTPATIENT
Start: 2019-11-15

## 2019-11-14 RX ORDER — METHYLPREDNISOLONE SODIUM SUCCINATE 125 MG/2ML
125 INJECTION, POWDER, LYOPHILIZED, FOR SOLUTION INTRAMUSCULAR; INTRAVENOUS PRN
Status: CANCELLED | OUTPATIENT
Start: 2019-11-15

## 2019-11-14 RX ORDER — PROCHLORPERAZINE MALEATE 10 MG
10 TABLET ORAL EVERY 6 HOURS PRN
Status: CANCELLED | OUTPATIENT
Start: 2019-11-15

## 2019-11-14 RX ORDER — ONDANSETRON 8 MG/1
8 TABLET, ORALLY DISINTEGRATING ORAL PRN
Status: CANCELLED | OUTPATIENT
Start: 2019-11-15

## 2019-11-14 RX ORDER — ONDANSETRON 2 MG/ML
4 INJECTION INTRAMUSCULAR; INTRAVENOUS PRN
Status: CANCELLED | OUTPATIENT
Start: 2019-11-15

## 2019-11-14 NOTE — PROGRESS NOTES
"Pharmacy Chemotherapy Verification    Dx: Cholangiocarcinoma  Cycle:  3 Previous treatment: 10/25/19    Protocol: Keytruda  Pembrolizumab (Keytruda) 200 mg IV on Day 1  21-day cycle until disease progression or unacceptable toxicity  NCCN Guidelines for Hepatobiliary Cancers. V.1.2019.  Disha REID et al. JCO. 2017;35(15_suppl):abstr 2512.  Bang YJ, et al. Eur J Cancer. 2015;51(3_suppl):S112.         Allergies:Gadolinium and Tape  /46   Pulse 85   Temp 37.1 °C (98.8 °F) (Temporal)   Resp 18   Ht 1.59 m (5' 2.6\") Comment: took 1 inch off for shoes  Wt 81.5 kg (179 lb 10.8 oz)   SpO2 97%   BMI 32.24 kg/m²  Body surface area is 1.9 meters squared.     Labs 11/14/19  ANC 1460 Hgb 8.3 Plt 221k  SCr 0.57 CrCl 100 mL/min   AST/ALT/AP = 14/6/61 Tbili = 0.3  TSH = 1.84 Free T4 = 0.89    Pembrolizumab 200 mg fixed dose   No calculation required, ok for final dose = 200 mg IV    Katherine Chandler, PharmD, BCOP    "

## 2019-11-15 ENCOUNTER — OUTPATIENT INFUSION SERVICES (OUTPATIENT)
Dept: ONCOLOGY | Facility: MEDICAL CENTER | Age: 69
End: 2019-11-15
Attending: INTERNAL MEDICINE
Payer: COMMERCIAL

## 2019-11-15 ENCOUNTER — DOCUMENTATION (OUTPATIENT)
Dept: NUTRITION | Facility: MEDICAL CENTER | Age: 69
End: 2019-11-15

## 2019-11-15 VITALS
TEMPERATURE: 98.8 F | HEIGHT: 63 IN | WEIGHT: 179.68 LBS | RESPIRATION RATE: 18 BRPM | HEART RATE: 85 BPM | DIASTOLIC BLOOD PRESSURE: 46 MMHG | SYSTOLIC BLOOD PRESSURE: 108 MMHG | OXYGEN SATURATION: 97 % | BODY MASS INDEX: 31.84 KG/M2

## 2019-11-15 DIAGNOSIS — C22.1 CHOLANGIOCARCINOMA (HCC): ICD-10-CM

## 2019-11-15 LAB — ACTH PLAS-MCNC: 4.5 PG/ML (ref 7.2–63.3)

## 2019-11-15 PROCEDURE — 700105 HCHG RX REV CODE 258: Performed by: INTERNAL MEDICINE

## 2019-11-15 PROCEDURE — 700111 HCHG RX REV CODE 636 W/ 250 OVERRIDE (IP): Performed by: INTERNAL MEDICINE

## 2019-11-15 PROCEDURE — 96413 CHEMO IV INFUSION 1 HR: CPT

## 2019-11-15 RX ORDER — TRAMADOL HYDROCHLORIDE 50 MG/1
TABLET ORAL
COMMUNITY
End: 2022-09-18

## 2019-11-15 RX ORDER — LEVOTHYROXINE SODIUM 0.07 MG/1
TABLET ORAL
COMMUNITY
End: 2019-11-15

## 2019-11-15 RX ORDER — SODIUM CHLORIDE 9 MG/ML
INJECTION, SOLUTION INTRAVENOUS CONTINUOUS
Status: DISCONTINUED | OUTPATIENT
Start: 2019-11-15 | End: 2019-11-15 | Stop reason: HOSPADM

## 2019-11-15 RX ORDER — SUMATRIPTAN 25 MG/1
TABLET, FILM COATED ORAL
COMMUNITY
End: 2022-09-18

## 2019-11-15 RX ORDER — ALBUTEROL SULFATE 90 UG/1
AEROSOL, METERED RESPIRATORY (INHALATION)
COMMUNITY
End: 2019-11-15

## 2019-11-15 RX ORDER — LIDOCAINE HYDROCHLORIDE 20 MG/ML
SOLUTION OROPHARYNGEAL
COMMUNITY
End: 2019-11-15

## 2019-11-15 RX ORDER — OXYCODONE HYDROCHLORIDE 10 MG/1
TABLET ORAL
COMMUNITY
End: 2019-11-15

## 2019-11-15 RX ORDER — HYDROMORPHONE HYDROCHLORIDE 4 MG/1
TABLET ORAL
COMMUNITY
End: 2019-11-15

## 2019-11-15 RX ADMIN — SODIUM CHLORIDE 200 MG: 9 INJECTION, SOLUTION INTRAVENOUS at 15:43

## 2019-11-15 RX ADMIN — SODIUM CHLORIDE: 9 INJECTION, SOLUTION INTRAVENOUS at 15:45

## 2019-11-15 NOTE — PROGRESS NOTES
"Pharmacy Chemotherapy Calculation    Dx: cholangiocarcinoma         Protocol: Keytruda     *Dosing Reference*  Pembrolizumab 200 mg IV over 30 min on Day 1  21-day cycle until disease progression or unacceptable toxicity  NCCN Guidelines for Hepatobiliary Cancers V.1.2019  Disha REID, et al. J Clin Oncol. 2017:35(15_suppl):[id number]  Yg ALVAREZ, et al. Eur J Cancer. 2015;51(3_suppl):S112    Allergies:  Gadolinium and Tape     /46   Pulse 85   Temp 37.1 °C (98.8 °F) (Temporal)   Resp 18   Ht 1.59 m (5' 2.6\") Comment: took 1 inch off for shoes  Wt 81.5 kg (179 lb 10.8 oz)   SpO2 97%   BMI 32.24 kg/m²  Body surface area is 1.9 meters squared.    Labs 11/14/19  ANC~ 1460 Plt = 221k   Hgb = 8.3     SCr = 0.57 mg/dL CrCl ~ 100 mL/min (minimum SCr of 0.7)   LFT's = WNLs  TBili = 0.3   TSH = 1.84 Free T4 = 0.89     Drug Order   (Drug name, dose, route, IV Fluid & volume, frequency, number of doses) Cycle 3  Previous treatment: C2 on 10/25/19     Medication = Pembrolizumab (Keytruda)  Base Dose = 200 mg fixed dose  Calc Dose: no calculation required  Final Dose = 200 mg  Route = IV  Fluid & Volume = NS 50 mL  Admin Duration = Over 30 min          <10% difference, okay to treat with final dose     By my signature below, I confirm this process was performed independently with the BSA and all final chemotherapy dosing calculations congruent. I have reviewed the above chemotherapy order and that my calculation of the final dose and BSA (when applicable) corroborate those calculations of the  pharmacist. Discrepancies of 10% or greater in the written dose have been addressed and documented within the [facility] Progress notes.    Jacky Cardona, PharmD  "

## 2019-11-15 NOTE — PROGRESS NOTES
Chemotherapy Verification - PRIMARY RN    D1C3    Height = 159 cm  Weight = 81.5 kg  BSA = 1.9 m2       Medication: Pembrolizumab (Keytruda)  Dose: 200 mg fixed dose  Calculated Dose: 200 mg fixe dose                               I confirm this process was performed independently with the BSA and all final chemotherapy dosing calculations congruent.  Any discrepancies of 10% or greater have been addressed with the chemotherapy pharmacist. The resolution of the discrepancy has been documented in the [facility] progress notes.

## 2019-11-15 NOTE — PROGRESS NOTES
Chemotherapy Verification - SECONDARY RN       Height = 62.6in  Weight = 81.5kg  BSA = 1.89m2       Medication: Keytruda  Dose: 200mg  Calculated Dose: 200mg SET DOSE                             (In mg/m2, AUC, mg/kg)         I confirm that this process was performed independently.

## 2019-11-15 NOTE — PROGRESS NOTES
"Nutrition Services:  New Chemo Patient  69 year old female with diagnosis of cholangiocarcinoma.    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  Patient reports that her weight has been fairly stable and she has had no GI related side effects recently.  She is currently receiving Keytruda.  She reports side effects from last two rounds were minimal other than the extreme fatigue and a slight decrease in appetite.  Patient reports she is taking iron pills but does not have any constipation currently.    We discussed nutrition related goals during treatment as well and RD role.  We discussed the benefit of small, frequent meals and snacks focusing on high calorie/protein items as well as the benefits of maintaining weight and lean muscle mass throughout treatment. I provided handouts and information regarding a general healthy diet with emphasis on a plant based diet limiting red and processed meats.  Patient receptive and agreeable to information provided.     Assessment:  • Pertinent Labs: Reviewed - WNL  • Pertinent Meds: Flonase, albuterol, dilaudid, xylocaine, roxicodone, synthroid.  • Per chart patient down 5.7%/11 pounds in the last 7 months which is insignificant but worth noting.  Of note her weight has trended up most recently but she does receive NS hydration with Keytruda.    Weight: 179 pounds/81.5 kg  Height: 5'2.6''  BMI: 32    Weight History from Chart:  11/2018: 190 pounds  4/2019: 190 pounds  10/25/2019: 175 pounds      PLAN/RECOMMEND -   • Increase meal and snack frequency to at least 4-6 " x/day.  • Focus on high calorie and protein foods - handout provided.  • Continue with current daily nutrition routine and contact RD should questions or concerns arise.   • Drink 6-8 cups of water or fluids per day to maintain hydration status.  Discussed tips for increasing water intake.     RD monitoring

## 2019-11-16 NOTE — PROGRESS NOTES
Pt arrived to [facility] ambulatory, here for C3 Keytruda for hepatobiliary cancer. Pt denies any active infections. Labs done yesterday, results reviewed and WNL to proceed. Keyruda infused per MAR, pt cali well. Line flushed clear. IV flushed and removed. Pressure dressing applied. Pt knows when to return, printout of future appts provided. Pt discharged home under care of friend in no apparent distress.

## 2019-12-07 ENCOUNTER — OUTPATIENT INFUSION SERVICES (OUTPATIENT)
Dept: ONCOLOGY | Facility: MEDICAL CENTER | Age: 69
End: 2019-12-07
Attending: INTERNAL MEDICINE
Payer: COMMERCIAL

## 2019-12-07 VITALS
BODY MASS INDEX: 32.46 KG/M2 | HEART RATE: 83 BPM | TEMPERATURE: 99.3 F | HEIGHT: 63 IN | WEIGHT: 183.2 LBS | SYSTOLIC BLOOD PRESSURE: 116 MMHG | RESPIRATION RATE: 18 BRPM | OXYGEN SATURATION: 99 % | DIASTOLIC BLOOD PRESSURE: 68 MMHG

## 2019-12-07 DIAGNOSIS — C22.1 CHOLANGIOCARCINOMA (HCC): ICD-10-CM

## 2019-12-07 LAB
ALBUMIN SERPL BCP-MCNC: 3.8 G/DL (ref 3.2–4.9)
ALBUMIN/GLOB SERPL: 1.5 G/DL
ALP SERPL-CCNC: 64 U/L (ref 30–99)
ALT SERPL-CCNC: 9 U/L (ref 2–50)
ANION GAP SERPL CALC-SCNC: 8 MMOL/L (ref 0–11.9)
ANISOCYTOSIS BLD QL SMEAR: ABNORMAL
AST SERPL-CCNC: 20 U/L (ref 12–45)
BASOPHILS # BLD AUTO: 0.7 % (ref 0–1.8)
BASOPHILS # BLD: 0.03 K/UL (ref 0–0.12)
BILIRUB SERPL-MCNC: 0.4 MG/DL (ref 0.1–1.5)
BUN SERPL-MCNC: 17 MG/DL (ref 8–22)
BURR CELLS BLD QL SMEAR: NORMAL
CALCIUM SERPL-MCNC: 9.1 MG/DL (ref 8.5–10.5)
CHLORIDE SERPL-SCNC: 110 MMOL/L (ref 96–112)
CO2 SERPL-SCNC: 22 MMOL/L (ref 20–33)
COMMENT 1642: NORMAL
CREAT SERPL-MCNC: 0.66 MG/DL (ref 0.5–1.4)
EOSINOPHIL # BLD AUTO: 0.08 K/UL (ref 0–0.51)
EOSINOPHIL NFR BLD: 1.8 % (ref 0–6.9)
ERYTHROCYTE [DISTWIDTH] IN BLOOD BY AUTOMATED COUNT: 52 FL (ref 35.9–50)
GLOBULIN SER CALC-MCNC: 2.6 G/DL (ref 1.9–3.5)
GLUCOSE SERPL-MCNC: 84 MG/DL (ref 65–99)
HCT VFR BLD AUTO: 33.5 % (ref 37–47)
HGB BLD-MCNC: 9.3 G/DL (ref 12–16)
HYPOCHROMIA BLD QL SMEAR: ABNORMAL
IMM GRANULOCYTES # BLD AUTO: 0.01 K/UL (ref 0–0.11)
IMM GRANULOCYTES NFR BLD AUTO: 0.2 % (ref 0–0.9)
LYMPHOCYTES # BLD AUTO: 1.07 K/UL (ref 1–4.8)
LYMPHOCYTES NFR BLD: 24.1 % (ref 22–41)
MCH RBC QN AUTO: 21.6 PG (ref 27–33)
MCHC RBC AUTO-ENTMCNC: 27.8 G/DL (ref 33.6–35)
MCV RBC AUTO: 77.7 FL (ref 81.4–97.8)
MICROCYTES BLD QL SMEAR: ABNORMAL
MONOCYTES # BLD AUTO: 0.32 K/UL (ref 0–0.85)
MONOCYTES NFR BLD AUTO: 7.2 % (ref 0–13.4)
MORPHOLOGY BLD-IMP: NORMAL
NEUTROPHILS # BLD AUTO: 2.93 K/UL (ref 2–7.15)
NEUTROPHILS NFR BLD: 66 % (ref 44–72)
NRBC # BLD AUTO: 0 K/UL
NRBC BLD-RTO: 0 /100 WBC
OVALOCYTES BLD QL SMEAR: NORMAL
PLATELET # BLD AUTO: 219 K/UL (ref 164–446)
PLATELET BLD QL SMEAR: NORMAL
PMV BLD AUTO: 9.7 FL (ref 9–12.9)
POIKILOCYTOSIS BLD QL SMEAR: NORMAL
POTASSIUM SERPL-SCNC: 4.2 MMOL/L (ref 3.6–5.5)
PROT SERPL-MCNC: 6.4 G/DL (ref 6–8.2)
RBC # BLD AUTO: 4.31 M/UL (ref 4.2–5.4)
RBC BLD AUTO: PRESENT
SCHISTOCYTES BLD QL SMEAR: NORMAL
SODIUM SERPL-SCNC: 140 MMOL/L (ref 135–145)
T4 FREE SERPL-MCNC: 0.75 NG/DL (ref 0.53–1.43)
TSH SERPL DL<=0.005 MIU/L-ACNC: 1.35 UIU/ML (ref 0.38–5.33)
WBC # BLD AUTO: 4.4 K/UL (ref 4.8–10.8)

## 2019-12-07 PROCEDURE — 84439 ASSAY OF FREE THYROXINE: CPT

## 2019-12-07 PROCEDURE — 85025 COMPLETE CBC W/AUTO DIFF WBC: CPT

## 2019-12-07 PROCEDURE — 80053 COMPREHEN METABOLIC PANEL: CPT

## 2019-12-07 PROCEDURE — 84443 ASSAY THYROID STIM HORMONE: CPT

## 2019-12-07 PROCEDURE — 36415 COLL VENOUS BLD VENIPUNCTURE: CPT

## 2019-12-07 RX ORDER — ONDANSETRON 2 MG/ML
4 INJECTION INTRAMUSCULAR; INTRAVENOUS PRN
Status: CANCELLED | OUTPATIENT
Start: 2019-12-07

## 2019-12-07 RX ORDER — PROCHLORPERAZINE MALEATE 10 MG
10 TABLET ORAL EVERY 6 HOURS PRN
Status: CANCELLED | OUTPATIENT
Start: 2019-12-07

## 2019-12-07 RX ORDER — EPINEPHRINE 1 MG/ML(1)
0.5 AMPUL (ML) INJECTION PRN
Status: CANCELLED | OUTPATIENT
Start: 2019-12-07

## 2019-12-07 RX ORDER — 0.9 % SODIUM CHLORIDE 0.9 %
VIAL (ML) INJECTION PRN
Status: CANCELLED | OUTPATIENT
Start: 2019-12-07

## 2019-12-07 RX ORDER — 0.9 % SODIUM CHLORIDE 0.9 %
5 VIAL (ML) INJECTION PRN
Status: CANCELLED | OUTPATIENT
Start: 2019-12-07

## 2019-12-07 RX ORDER — ONDANSETRON 8 MG/1
8 TABLET, ORALLY DISINTEGRATING ORAL PRN
Status: CANCELLED | OUTPATIENT
Start: 2019-12-07

## 2019-12-07 RX ORDER — SODIUM CHLORIDE 9 MG/ML
INJECTION, SOLUTION INTRAVENOUS CONTINUOUS
Status: CANCELLED | OUTPATIENT
Start: 2019-12-07

## 2019-12-07 RX ORDER — METHYLPREDNISOLONE SODIUM SUCCINATE 125 MG/2ML
125 INJECTION, POWDER, LYOPHILIZED, FOR SOLUTION INTRAMUSCULAR; INTRAVENOUS PRN
Status: CANCELLED | OUTPATIENT
Start: 2019-12-07

## 2019-12-07 RX ORDER — DIPHENHYDRAMINE HYDROCHLORIDE 50 MG/ML
50 INJECTION INTRAMUSCULAR; INTRAVENOUS PRN
Status: CANCELLED | OUTPATIENT
Start: 2019-12-07

## 2019-12-07 NOTE — PROGRESS NOTES
Pt to [facility] for labs.  Pt w/ no s/s of infection, pt has no complaints at this time.  Labs drawn from left hand via 23G butterfly.  Gauze and coban dressing applied.  Pt left on foot in NAD.  Pt already has appt for 12-8.      Lab called, purple top specimen insufficient amount for ACTH test.  Note placed in pt's chart to please redraw ACTH at chemo appt 12-8.

## 2019-12-08 ENCOUNTER — OUTPATIENT INFUSION SERVICES (OUTPATIENT)
Dept: ONCOLOGY | Facility: MEDICAL CENTER | Age: 69
End: 2019-12-08
Attending: INTERNAL MEDICINE
Payer: COMMERCIAL

## 2019-12-08 VITALS
TEMPERATURE: 98.6 F | OXYGEN SATURATION: 97 % | HEIGHT: 63 IN | BODY MASS INDEX: 32.34 KG/M2 | WEIGHT: 182.54 LBS | HEART RATE: 89 BPM | DIASTOLIC BLOOD PRESSURE: 69 MMHG | RESPIRATION RATE: 18 BRPM | SYSTOLIC BLOOD PRESSURE: 133 MMHG

## 2019-12-08 DIAGNOSIS — C22.1 CHOLANGIOCARCINOMA (HCC): ICD-10-CM

## 2019-12-08 PROCEDURE — 700105 HCHG RX REV CODE 258: Performed by: INTERNAL MEDICINE

## 2019-12-08 PROCEDURE — 700111 HCHG RX REV CODE 636 W/ 250 OVERRIDE (IP): Performed by: INTERNAL MEDICINE

## 2019-12-08 PROCEDURE — 96413 CHEMO IV INFUSION 1 HR: CPT

## 2019-12-08 RX ADMIN — SODIUM CHLORIDE 200 MG: 9 INJECTION, SOLUTION INTRAVENOUS at 12:14

## 2019-12-08 NOTE — PROGRESS NOTES
Pt ambulatory to [facility] for C4 of Keytruda for cholangiocarcinoma.  Pt w/ no s/s of infection, pt has no complaints at this time.  Pt had labs drawn 12-7, verified results w/n parameters for tx today.  PIV established in right posterior wrist brisk blood return noted, ACTH lab redrawn, flushed per protocol.  Keytruda infused w/ filter in place w/ no adverse reactions.  PIV flushed and removed, gauze and coban dressing applied.  Pt left on foot in NAD.  Confirmed pt's next appt.

## 2019-12-08 NOTE — PROGRESS NOTES
Chemotherapy Verification - SECONDARY RN       Height = 159 cm  Weight = 82.8 kg  BSA = 1.91 m2       Medication: Keytruda  Dose: 200 mg set dose  Calculated Dose: 200 mg                             (In mg/m2, AUC, mg/kg)     I confirm that this process was performed independently.

## 2019-12-08 NOTE — PROGRESS NOTES
"Pharmacy Chemotherapy Verification:     Patient name: Gigi Pagan  Dx: cholangiocarcinoma         Protocol: Keytruda      *Dosing Reference*  Pembrolizumab 200 mg IV over 30 min on Day 1  21-day cycle until disease progression or unacceptable toxicity  NCCN Guidelines for Hepatobiliary Cancers V.1.2019  Disha REID et al. J Clin Oncol. 2017:35(15_suppl):[id number]  Yg YJ, et al. Eur J Cancer. 2015;51(3_suppl):S112    Allergies:  Gadolinium and Tape       /69   Pulse 89   Temp 37 °C (98.6 °F) (Temporal)   Resp 18   Ht 1.59 m (5' 2.6\") Comment: took 1 inch off for shoes  Wt 82.8 kg (182 lb 8.7 oz)   SpO2 97%   BMI 32.75 kg/m²  Body surface area is 1.91 meters squared.    Labs 12/07/19  ANC~ 2900 Plt = 219k   Hgb = 9.3     SCr = 0.66 mg/dL CrCl ~101 mL/min (minimum SCr of 0.7)   LFT's = WNL  TBili = 0.4  TSH = 1.350 Free T4 = 0.75     Drug Order   (Drug name, dose, route, IV Fluid & volume, frequency, number of doses) Cycle 4  Previous treatment: C3 on 11/15/19      Medication = Pembrolizumab (Keytruda)  Base Dose = 200 mg fixed dose  Calc Dose: no calculation required  Final Dose = 200 mg  Route = IV  Fluid & Volume = NS 50 mL  Admin Duration = Over 30 min          <10% difference, okay to treat with final dose     By my signature below, I confirm this process was performed independently with the BSA and all final chemotherapy dosing calculations congruent. I have reviewed the above chemotherapy order and that my calculation of the final dose and BSA (when applicable) corroborate those calculations of the  pharmacist. Discrepancies of 10% or greater in the written dose have been addressed and documented within the [facility] Progress notes.    Sharon Cervantes, PharmD, BCOP    "

## 2019-12-08 NOTE — PROGRESS NOTES
"Pharmacy Chemotherapy Verification    Dx: Cholangiocarcinoma  Cycle:  4 Previous treatment: 11/15/19    Protocol: Keytruda  Pembrolizumab (Keytruda) 200 mg IV on Day 1  21-day cycle until disease progression or unacceptable toxicity  NCCN Guidelines for Hepatobiliary Cancers. V.1.2019.  Disha REID et al. JCO. 2017;35(15_suppl):abstr 2512.  Yg YJ, et al. Eur J Cancer. 2015;51(3_suppl):S112.         Allergies:Gadolinium and Tape  /69   Pulse 89   Temp 37 °C (98.6 °F) (Temporal)   Resp 18   Ht 1.59 m (5' 2.6\") Comment: took 1 inch off for shoes  Wt 82.8 kg (182 lb 8.7 oz)   SpO2 97%   BMI 32.75 kg/m²  Body surface area is 1.91 meters squared.     Labs 12/7/19  ANC 2930 Plt 219k  Hgb = 9.3  SCr 0.66 CrCl ~ 102 mL/min (min Cr 0.7)  AST/ALT/AP = WNL Tbili = 0.4  TSH = 1.35 Free T4 = 0.75    Pembrolizumab 200 mg fixed dose   No calculation required, ok for final dose = 200 mg IV    Skyler Steele, PharmD    "

## 2019-12-08 NOTE — PROGRESS NOTES
Chemotherapy Verification - PRIMARY RN      Height = 1.59 m  Weight = 82.8 kg  BSA = 1.91 m2       Medication: pembrolizumab  Dose: set does  Calculated Dose: 200 mg set dose                             (In mg/m2, AUC, mg/kg)       I confirm this process was performed independently with the BSA and all final chemotherapy dosing calculations congruent.  Any discrepancies of 10% or greater have been addressed with the chemotherapy pharmacist. The resolution of the discrepancy has been documented in the EPIC progress notes.

## 2019-12-12 ENCOUNTER — TELEPHONE (OUTPATIENT)
Dept: NUTRITION | Facility: MEDICAL CENTER | Age: 69
End: 2019-12-12

## 2019-12-12 NOTE — TELEPHONE ENCOUNTER
Nutrition Services: Telephone Encounter  RD called for check-in. Pt did not answer. RD left contact information. Per chart, has gained 3 lbs since RD last interviewed on 11/15, will attempt to touch base before signing off as indicated.     Please have patient contact RD: [phone]    RD will attempt to follow-up

## 2019-12-16 ENCOUNTER — HOSPITAL ENCOUNTER (EMERGENCY)
Facility: MEDICAL CENTER | Age: 69
End: 2019-12-16
Attending: EMERGENCY MEDICINE
Payer: COMMERCIAL

## 2019-12-16 ENCOUNTER — APPOINTMENT (OUTPATIENT)
Dept: RADIOLOGY | Facility: MEDICAL CENTER | Age: 69
End: 2019-12-16
Attending: EMERGENCY MEDICINE
Payer: COMMERCIAL

## 2019-12-16 VITALS
SYSTOLIC BLOOD PRESSURE: 138 MMHG | WEIGHT: 184.75 LBS | TEMPERATURE: 97.5 F | OXYGEN SATURATION: 98 % | HEIGHT: 64 IN | RESPIRATION RATE: 16 BRPM | HEART RATE: 88 BPM | DIASTOLIC BLOOD PRESSURE: 78 MMHG | BODY MASS INDEX: 31.54 KG/M2

## 2019-12-16 DIAGNOSIS — S03.2XXA TOOTH AVULSION, INITIAL ENCOUNTER: ICD-10-CM

## 2019-12-16 DIAGNOSIS — S09.93XA FACIAL INJURY, INITIAL ENCOUNTER: ICD-10-CM

## 2019-12-16 DIAGNOSIS — S01.512A LACERATION OF BUCCAL MUCOSA, INITIAL ENCOUNTER: ICD-10-CM

## 2019-12-16 DIAGNOSIS — F41.8 SITUATIONAL ANXIETY: ICD-10-CM

## 2019-12-16 PROCEDURE — 99284 EMERGENCY DEPT VISIT MOD MDM: CPT

## 2019-12-16 PROCEDURE — A9270 NON-COVERED ITEM OR SERVICE: HCPCS | Performed by: EMERGENCY MEDICINE

## 2019-12-16 PROCEDURE — 70486 CT MAXILLOFACIAL W/O DYE: CPT

## 2019-12-16 PROCEDURE — 72125 CT NECK SPINE W/O DYE: CPT

## 2019-12-16 PROCEDURE — 700102 HCHG RX REV CODE 250 W/ 637 OVERRIDE(OP): Performed by: EMERGENCY MEDICINE

## 2019-12-16 PROCEDURE — 70450 CT HEAD/BRAIN W/O DYE: CPT

## 2019-12-16 RX ORDER — IBUPROFEN 600 MG/1
600 TABLET ORAL ONCE
Status: COMPLETED | OUTPATIENT
Start: 2019-12-16 | End: 2019-12-16

## 2019-12-16 RX ORDER — OXYCODONE HYDROCHLORIDE AND ACETAMINOPHEN 5; 325 MG/1; MG/1
1 TABLET ORAL ONCE
Status: COMPLETED | OUTPATIENT
Start: 2019-12-16 | End: 2019-12-16

## 2019-12-16 RX ORDER — TRAMADOL HYDROCHLORIDE 50 MG/1
50 TABLET ORAL ONCE
Status: COMPLETED | OUTPATIENT
Start: 2019-12-16 | End: 2019-12-16

## 2019-12-16 RX ADMIN — TRAMADOL HYDROCHLORIDE 50 MG: 50 TABLET, FILM COATED ORAL at 15:17

## 2019-12-16 RX ADMIN — IBUPROFEN 600 MG: 600 TABLET ORAL at 13:42

## 2019-12-16 RX ADMIN — OXYCODONE HYDROCHLORIDE AND ACETAMINOPHEN 1 TABLET: 5; 325 TABLET ORAL at 13:42

## 2019-12-16 NOTE — ED PROVIDER NOTES
ED Provider Note    ED Provider Note    Primary care provider: Marcia Knight M.D.  Means of arrival: walk in  History obtained from: Patient    CHIEF COMPLAINT  Chief Complaint   Patient presents with   • Fall   • Lip Laceration   • Jaw Pain     teeth loose   • Neck Pain     Seen at 1:20 PM.   HPI  Gigi Pagan is a 69 y.o. female who presents to the Emergency Department after a trip and fall.  The patient stumbled while going to the car at the  home.  The patient recently lost her spouse.  She fell forward, she does not believe she lost consciousness.  She hit the ground with her face.  She notes moderate neck pain, face pain and feels that her teeth are then pushed inward.  Subsequently they called her dentist and do have a follow-up appointment tomorrow at 1 PM.  Tetanus is unknown.  The patient denies any nausea, vomiting, numbness or weakness.  She has some tingling around the neck where her neck is hurting.  She denies any bleeding diathesis.    REVIEW OF SYSTEMS  See HPI,   Remainder of ROS negative.     PAST MEDICAL HISTORY   has a past medical history of Anesthesia, Arthritis, ASTHMA, Backpain, Bronchitis, Cancer (HCC), Hypothyroidism, Indigestion, Jaundice, and Pneumonia.    SURGICAL HISTORY   has a past surgical history that includes other orthopedic surgery; other; ercp w/ insertion stent/tube (3/29/2010); ercp w/sphincterotomy/papill. (3/29/2010); ercp in or (2010);  delivery only; gyn surgery (); resec liver,part lobectomy (); pelviscopy (2011); omentectomy (2011); biopsy general (2011); and oophorectomy (2011).    SOCIAL HISTORY  Social History     Tobacco Use   • Smoking status: Never Smoker   • Smokeless tobacco: Never Used   Substance Use Topics   • Alcohol use: Yes   • Drug use: No      Social History     Substance and Sexual Activity   Drug Use No       FAMILY HISTORY  Family History   Family history unknown: Yes       CURRENT  "MEDICATIONS  Reviewed.  See Encounter Summary.     ALLERGIES  Allergies   Allergen Reactions   • Gadolinium Anaphylaxis   • Iodine Anaphylaxis   • Oxycodone Itching   • Tape Contact Dermatitis       PHYSICAL EXAM  VITAL SIGNS: /78   Pulse 88   Temp 36.4 °C (97.5 °F) (Temporal)   Resp 16   Ht 1.626 m (5' 4\")   Wt 83.8 kg (184 lb 11.9 oz)   SpO2 98%   BMI 31.71 kg/m²   Constitutional: Awake, alert in no apparent distress.  Appears uncomfortable.  HENT: Normocephalic, no raccoon eyes, no wakefield signs, no hemotympanum.  Bilateral external ears normal. Nose normal.   The patient does have some mild midline tenderness at around C3.  The range of motion is preserved.  She has a 8 mm laceration on the inner buccal mucosa of the lower lip.  The alveolar ridge is intact.  The patient appears to have normal occlusion, the right upper incisor appears to be slightly impacted but is not mobile at this time.  Eyes: Conjunctiva normal, non-icteric, EOMI.    Thorax & Lungs: Easy unlabored respirations, Clear to ascultation bilaterally.  Cardiovascular: Regular rate, Regular rhythm, No murmurs, rubs or gallops.  Abdomen:  Soft, nontender, nondistended, normal active bowel sounds.   :    Skin: Visualized skin is  Dry, No erythema, No rash.   Musculoskeletal:   No cyanosis, clubbing or edema.  Neurologic: Alert, Grossly non-focal.   Psychiatric: Normal affect, Normal mood  Lymphatic:  No cervical LAD      RADIOLOGY  CT-CSPINE WITHOUT PLUS RECONS   Final Result      1.  No acute fracture is identified.      2.  Minimal anterolisthesis at C3-4, likely degenerative      3.  Multilevel degenerative disc disease and facet arthropathy as described.      CT-MAXILLOFACIAL W/O PLUS RECONS   Final Result      No evidence of facial fracture.      CT-HEAD W/O   Final Result      1.  No evidence of acute intracranial process.      2.  Cerebral atrophy as well as periventricular chronic small vessel ischemic change.            COURSE " & MEDICAL DECISION MAKING  Pertinent Labs & Imaging studies reviewed. (See chart for details)    Differential diagnoses include but are not limited to: Facial fractures, ICH, most likely contusions    1:20 PM - Medical record reviewed, patient seen and examined at bedside.    3:14 PM -patient feels improvement, she no longer complains of neck pain.    Decision Making:  This is a 69 y.o. year old female who presents after mechanical slip and fall with primarily dental pain however she also had some neck pain.  I was unable to clear the neck using [place] head CT criteria due to the patient's age.  CT the head is negative for ICH.  Maxillofacial imaging is negative for fracture, I am able to see the slight tooth avulsion, the patient does not have an alveolar ridge fracture.  The tooth itself is not loose at this time, the patient has follow-up with a dentist tomorrow which headache is appropriate.  She has plenty of tramadol at home which she can use for pain.  The neck does not show any acute fracture, after analgesia the patient has good range of motion and no longer complains of neck pain.  I do not suspect SCIWORA.  She does have a laceration the bugle mucosa, explained that this generally is not repaired as it will heal well without any sutures.    Discharge Medications:  Discharge Medication List as of 12/16/2019  3:26 PM          The patient was discharged home (see d/c instructions) and parent was told to return immediately for any signs or symptoms listed, or any worsening at all.  The patient's parent verbally agreed to the discharge precautions and follow-up plan which is documented in EPIC.    The patient's blood pressure is elevated today. >120/80. I have referred them to primary care for follow up.       FINAL IMPRESSION  1. Laceration of buccal mucosa, initial encounter    2. Facial injury, initial encounter    3. Tooth avulsion, initial encounter    4. Situational anxiety

## 2019-12-16 NOTE — ED TRIAGE NOTES
Chief Complaint   Patient presents with   • Fall   • Lip Laceration   • Jaw Pain     teeth loose   • Neck Pain     Pt ambulated to triage with above complaints. Pt tripped and fell face forward, denies loc.   Lip laceration , bleeding stopped, pt felt that her teeth not align.   Provided with icepak, c-collar place by ed tech

## 2019-12-21 ENCOUNTER — OFFICE VISIT (OUTPATIENT)
Dept: URGENT CARE | Facility: PHYSICIAN GROUP | Age: 69
End: 2019-12-21
Payer: COMMERCIAL

## 2019-12-21 VITALS
DIASTOLIC BLOOD PRESSURE: 60 MMHG | SYSTOLIC BLOOD PRESSURE: 102 MMHG | BODY MASS INDEX: 31.89 KG/M2 | OXYGEN SATURATION: 97 % | TEMPERATURE: 98.7 F | RESPIRATION RATE: 16 BRPM | HEIGHT: 63 IN | WEIGHT: 180 LBS | HEART RATE: 82 BPM

## 2019-12-21 DIAGNOSIS — S46.812A STRAIN OF LEFT TRAPEZIUS MUSCLE, INITIAL ENCOUNTER: ICD-10-CM

## 2019-12-21 DIAGNOSIS — S16.1XXA STRAIN OF NECK MUSCLE, INITIAL ENCOUNTER: ICD-10-CM

## 2019-12-21 DIAGNOSIS — S46.019A STRAIN OF ROTATOR CUFF CAPSULE, UNSPECIFIED LATERALITY, INITIAL ENCOUNTER: ICD-10-CM

## 2019-12-21 PROCEDURE — 99214 OFFICE O/P EST MOD 30 MIN: CPT | Performed by: PHYSICIAN ASSISTANT

## 2019-12-21 RX ORDER — METHYLPREDNISOLONE 4 MG/1
TABLET ORAL
Qty: 1 PACKAGE | Refills: 0 | Status: SHIPPED | OUTPATIENT
Start: 2019-12-21 | End: 2020-03-22

## 2019-12-24 ENCOUNTER — TELEPHONE (OUTPATIENT)
Dept: NUTRITION | Facility: MEDICAL CENTER | Age: 69
End: 2019-12-24

## 2019-12-26 ENCOUNTER — HOSPITAL ENCOUNTER (OUTPATIENT)
Dept: RADIOLOGY | Facility: MEDICAL CENTER | Age: 69
End: 2019-12-26
Attending: INTERNAL MEDICINE
Payer: COMMERCIAL

## 2019-12-26 DIAGNOSIS — C22.1 MALIGNANT NEOPLASM OF INTRAHEPATIC BILE DUCTS (HCC): ICD-10-CM

## 2019-12-26 PROCEDURE — 700117 HCHG RX CONTRAST REV CODE 255: Performed by: INTERNAL MEDICINE

## 2019-12-26 PROCEDURE — 71260 CT THORAX DX C+: CPT

## 2019-12-26 RX ADMIN — IOHEXOL 25 ML: 240 INJECTION, SOLUTION INTRATHECAL; INTRAVASCULAR; INTRAVENOUS; ORAL at 10:00

## 2019-12-26 RX ADMIN — IOHEXOL 100 ML: 350 INJECTION, SOLUTION INTRAVENOUS at 10:00

## 2019-12-26 ASSESSMENT — ENCOUNTER SYMPTOMS
NECK PAIN: 1
FALLS: 1
NAUSEA: 0
LOSS OF CONSCIOUSNESS: 0
BACK PAIN: 1
HEADACHES: 0
WEIGHT LOSS: 0
PALPITATIONS: 0
SENSORY CHANGE: 0
TINGLING: 0
CHILLS: 0
VOMITING: 0
SORE THROAT: 0
ABDOMINAL PAIN: 0
FEVER: 0
SHORTNESS OF BREATH: 0
BLURRED VISION: 0

## 2019-12-26 NOTE — PROGRESS NOTES
Subjective:      Gigi Pagan is a 69 y.o. female who presents with Fall (injured cant lift up her arms without pain x 6 days)      HPI:  This is a new problem. Gigi Pagan is a 69 y.o. female who presents today for evaluation of neck and shoulder pain after a fall.  Patient was initially evaluated for the fall on 2019 in the emergency department.  The patient fell while going to require the  home due to the recent loss of her spouse.  When she stumbled she fell forward and hit her face against the concrete.  She had a bucca mucosa laceration, broke multiple teeth which she seen the dentist, and had various muscle strains of her cervical region.  In the emergency department she had a CT C-spine, CT head without contrast, and CT maxillofacial without contrast.  All the imaging was negative for any acute processes, including fracture.  She says that the time her arm did not bother her very much but as the days have gone on she is noticed increased pain in the muscles of her neck as well as her shoulders.  She says that she is unable to lift her arms above her head right now without having significant pain.  No numbness/tingling or focal weakness.  No fever/chills.  No new injury.      Review of Systems   Constitutional: Negative for chills, fever and weight loss.   HENT: Negative for sore throat.    Eyes: Negative for blurred vision.   Respiratory: Negative for shortness of breath.    Cardiovascular: Negative for chest pain and palpitations.   Gastrointestinal: Negative for abdominal pain, nausea and vomiting.   Musculoskeletal: Positive for back pain (upper back), falls, joint pain (shoulders) and neck pain.   Neurological: Negative for tingling, sensory change, loss of consciousness and headaches.       PMH:  has a past medical history of Anesthesia, Arthritis, ASTHMA, Backpain, Bronchitis, Cancer (HCC), Hypothyroidism, Indigestion, Jaundice, and Pneumonia. She also has no past  medical history of Angina, Arrhythmia, CATARACT, Congestive heart failure (HCC), COPD, Diabetes, Dialysis, Glaucoma, Heart murmur, Heart valve disease, Hypertension, Infectious disease, Myocardial infarct (HCC), Other specified symptom associated with female genital organs, Pacemaker, Personal history of venous thrombosis and embolism, Psychiatric problem, Renal disorder, Rheumatic fever, Seizure (HCC), Stroke (HCC), Unspecified hemorrhagic conditions, or Unspecified urinary incontinence.  MEDS:   Current Outpatient Medications:   •  methylPREDNISolone (MEDROL DOSEPAK) 4 MG Tablet Therapy Pack, Take as directed, Disp: 1 Package, Rfl: 0  •  SUMAtriptan (IMITREX) 25 MG Tab tablet, sumatriptan 25 mg tablet, Disp: , Rfl:   •  tramadol (ULTRAM) 50 MG Tab, tramadol 50 mg tablet  TAKE 1 TABLET BY MOUTH TWICE A DAY AS NEEDED, Disp: , Rfl:   •  Tapentadol HCl (NUCYNTA) 50 MG Tab, Take  by mouth., Disp: , Rfl:   •  fluticasone (FLONASE) 50 MCG/ACT nasal spray, Spray 2 Sprays in nose every day., Disp: 16 g, Rfl: 0  •  albuterol 108 (90 Base) MCG/ACT Aero Soln inhalation aerosol, Inhale 2 Puffs by mouth every 6 hours as needed for Shortness of Breath., Disp: , Rfl:   •  SYNTHROID 75 MCG TABS, Take 75 mcg by mouth every day., Disp: , Rfl:   ALLERGIES:   Allergies   Allergen Reactions   • Gadolinium Anaphylaxis   • Iodine Anaphylaxis   • Oxycodone Itching   • Tape Contact Dermatitis     SURGHX:   Past Surgical History:   Procedure Laterality Date   • PELVISCOPY  4/29/2011    Performed by KANDIS AARON at SURGERY [facility] ORS   • OMENTECTOMY  4/29/2011    Performed by KANDIS AARON at SURGERY [facility] ORS   • BIOPSY GENERAL  4/29/2011    Performed by KANDIS AARON at SURGERY [facility] ORS   • OOPHORECTOMY  4/29/2011    Performed by KANDIS AARON at [facility]   • ERCP IN OR  4/8/2010    Performed by JONATAN CURRY at [facility]   • ERCP W/ INSERTION STENT/TUBE  3/29/2010    Performed by JOSEF  "BUNNY BOCANEGRA at SURGERY [facility]   • ERCP W/SPHINCTEROTOMY/PAPILL.  3/29/2010    Performed by BUNNY BAHENA at SURGERY [facility]   • PB RESEC LIVER,PART LOBECTOMY     • GYN SURGERY      partial hysterectomy   • OTHER      1970 tonsilectomy    • OTHER ORTHOPEDIC SURGERY       spinal fusion  minucus disk   • PB  DELIVERY ONLY      c section x 4     SOCHX:  reports that she has never smoked. She has never used smokeless tobacco. She reports current alcohol use. She reports that she does not use drugs.  FH: Family history was reviewed, no pertinent findings to report     Objective:     /60 (BP Location: Left arm, Patient Position: Sitting, BP Cuff Size: Adult)   Pulse 82   Temp 37.1 °C (98.7 °F) (Temporal)   Resp 16   Ht 1.588 m (5' 2.5\")   Wt 81.6 kg (180 lb)   SpO2 97%   BMI 32.40 kg/m²      Physical Exam  Constitutional:       Appearance: She is well-developed.   HENT:      Head: Normocephalic and atraumatic.      Right Ear: External ear normal.      Left Ear: External ear normal.   Eyes:      Conjunctiva/sclera: Conjunctivae normal.      Pupils: Pupils are equal, round, and reactive to light.   Cardiovascular:      Rate and Rhythm: Normal rate and regular rhythm.      Heart sounds: Normal heart sounds. No murmur.   Pulmonary:      Effort: Pulmonary effort is normal.      Breath sounds: Normal breath sounds. No wheezing.   Musculoskeletal:      Comments: Cervical and thoracic spinous processes are nontender to palpation.  No step-offs or obvious deformity identified.  Upper thoracic and cervical paraspinous muscles are diffusely TTP with no spasm noted.  No erythema, ecchymosis, swelling, or obvious deformity noted on exam.  Patient is full ROM of her cervical spine but she does note pain with full forward flexion and extension.  Negative Spurling's.  Bilateral shoulders are diffusely TTP.  Pain with speeds test and empty can test bilaterally.  Left trapezius muscle " is diffusely TTP.  Patient has limited ROM of upper extremities bilaterally due to pain.  She is unable to abduct her arms past 90 degrees bilaterally.   Skin:     General: Skin is warm and dry.      Capillary Refill: Capillary refill takes less than 2 seconds.   Neurological:      Mental Status: She is alert and oriented to person, place, and time.   Psychiatric:         Behavior: Behavior normal.         Judgment: Judgment normal.                 Assessment/Plan:       1. Strain of neck muscle, initial encounter  - methylPREDNISolone (MEDROL DOSEPAK) 4 MG Tablet Therapy Pack; Take as directed  Dispense: 1 Package; Refill: 0    2. Strain of rotator cuff capsule, unspecified laterality, initial encounter  - methylPREDNISolone (MEDROL DOSEPAK) 4 MG Tablet Therapy Pack; Take as directed  Dispense: 1 Package; Refill: 0    3. Strain of left trapezius muscle, initial encounter  - methylPREDNISolone (MEDROL DOSEPAK) 4 MG Tablet Therapy Pack; Take as directed  Dispense: 1 Package; Refill: 0      - Alternate ice/heat  - Light stretching exercises as discussed  - Follow up with PCP if no improvement after a few days            Differential Diagnosis, natural history, and supportive care discussed. Return to the Urgent Care or follow up with your PCP if symptoms fail to resolve, or for any new or worsening symptoms. Emergency room precautions discussed. Patient and/or family appears understanding of information.

## 2019-12-27 RX ORDER — ONDANSETRON 2 MG/ML
4 INJECTION INTRAMUSCULAR; INTRAVENOUS PRN
Status: CANCELLED | OUTPATIENT
Start: 2019-12-27

## 2019-12-27 RX ORDER — EPINEPHRINE 1 MG/ML(1)
0.5 AMPUL (ML) INJECTION PRN
Status: CANCELLED | OUTPATIENT
Start: 2019-12-27

## 2019-12-27 RX ORDER — ONDANSETRON 8 MG/1
8 TABLET, ORALLY DISINTEGRATING ORAL PRN
Status: CANCELLED | OUTPATIENT
Start: 2019-12-27

## 2019-12-27 RX ORDER — 0.9 % SODIUM CHLORIDE 0.9 %
VIAL (ML) INJECTION PRN
Status: CANCELLED | OUTPATIENT
Start: 2019-12-27

## 2019-12-27 RX ORDER — 0.9 % SODIUM CHLORIDE 0.9 %
5 VIAL (ML) INJECTION PRN
Status: CANCELLED | OUTPATIENT
Start: 2019-12-27

## 2019-12-27 RX ORDER — PROCHLORPERAZINE MALEATE 10 MG
10 TABLET ORAL EVERY 6 HOURS PRN
Status: CANCELLED | OUTPATIENT
Start: 2019-12-27

## 2019-12-27 RX ORDER — SODIUM CHLORIDE 9 MG/ML
INJECTION, SOLUTION INTRAVENOUS CONTINUOUS
Status: CANCELLED | OUTPATIENT
Start: 2019-12-27

## 2019-12-27 RX ORDER — DIPHENHYDRAMINE HYDROCHLORIDE 50 MG/ML
50 INJECTION INTRAMUSCULAR; INTRAVENOUS PRN
Status: CANCELLED | OUTPATIENT
Start: 2019-12-27

## 2019-12-27 RX ORDER — METHYLPREDNISOLONE SODIUM SUCCINATE 125 MG/2ML
125 INJECTION, POWDER, LYOPHILIZED, FOR SOLUTION INTRAMUSCULAR; INTRAVENOUS PRN
Status: CANCELLED | OUTPATIENT
Start: 2019-12-27

## 2019-12-28 ENCOUNTER — OUTPATIENT INFUSION SERVICES (OUTPATIENT)
Dept: ONCOLOGY | Facility: MEDICAL CENTER | Age: 69
End: 2019-12-28
Attending: INTERNAL MEDICINE
Payer: COMMERCIAL

## 2019-12-28 VITALS
RESPIRATION RATE: 18 BRPM | DIASTOLIC BLOOD PRESSURE: 65 MMHG | HEART RATE: 94 BPM | OXYGEN SATURATION: 98 % | HEIGHT: 63 IN | TEMPERATURE: 97.6 F | SYSTOLIC BLOOD PRESSURE: 122 MMHG | WEIGHT: 178.13 LBS | BODY MASS INDEX: 31.56 KG/M2

## 2019-12-28 DIAGNOSIS — C22.1 CHOLANGIOCARCINOMA (HCC): ICD-10-CM

## 2019-12-28 LAB
ALBUMIN SERPL BCP-MCNC: 4.1 G/DL (ref 3.2–4.9)
ALBUMIN/GLOB SERPL: 1.6 G/DL
ALP SERPL-CCNC: 66 U/L (ref 30–99)
ALT SERPL-CCNC: 7 U/L (ref 2–50)
ANION GAP SERPL CALC-SCNC: 10 MMOL/L (ref 0–11.9)
ANISOCYTOSIS BLD QL SMEAR: ABNORMAL
AST SERPL-CCNC: 18 U/L (ref 12–45)
BASOPHILS # BLD AUTO: 0.9 % (ref 0–1.8)
BASOPHILS # BLD: 0.04 K/UL (ref 0–0.12)
BILIRUB SERPL-MCNC: 0.7 MG/DL (ref 0.1–1.5)
BUN SERPL-MCNC: 13 MG/DL (ref 8–22)
CALCIUM SERPL-MCNC: 9.4 MG/DL (ref 8.5–10.5)
CHLORIDE SERPL-SCNC: 107 MMOL/L (ref 96–112)
CO2 SERPL-SCNC: 23 MMOL/L (ref 20–33)
COMMENT 1642: NORMAL
CREAT SERPL-MCNC: 0.59 MG/DL (ref 0.5–1.4)
EOSINOPHIL # BLD AUTO: 0.07 K/UL (ref 0–0.51)
EOSINOPHIL NFR BLD: 1.6 % (ref 0–6.9)
ERYTHROCYTE [DISTWIDTH] IN BLOOD BY AUTOMATED COUNT: 49.6 FL (ref 35.9–50)
GLOBULIN SER CALC-MCNC: 2.5 G/DL (ref 1.9–3.5)
GLUCOSE SERPL-MCNC: 87 MG/DL (ref 65–99)
HCT VFR BLD AUTO: 37.9 % (ref 37–47)
HGB BLD-MCNC: 10.8 G/DL (ref 12–16)
HYPOCHROMIA BLD QL SMEAR: ABNORMAL
IMM GRANULOCYTES # BLD AUTO: 0 K/UL (ref 0–0.11)
IMM GRANULOCYTES NFR BLD AUTO: 0 % (ref 0–0.9)
LYMPHOCYTES # BLD AUTO: 1.42 K/UL (ref 1–4.8)
LYMPHOCYTES NFR BLD: 32.4 % (ref 22–41)
MCH RBC QN AUTO: 20.8 PG (ref 27–33)
MCHC RBC AUTO-ENTMCNC: 28.5 G/DL (ref 33.6–35)
MCV RBC AUTO: 73 FL (ref 81.4–97.8)
MICROCYTES BLD QL SMEAR: ABNORMAL
MONOCYTES # BLD AUTO: 0.34 K/UL (ref 0–0.85)
MONOCYTES NFR BLD AUTO: 7.8 % (ref 0–13.4)
MORPHOLOGY BLD-IMP: NORMAL
NEUTROPHILS # BLD AUTO: 2.51 K/UL (ref 2–7.15)
NEUTROPHILS NFR BLD: 57.3 % (ref 44–72)
NRBC # BLD AUTO: 0 K/UL
NRBC BLD-RTO: 0 /100 WBC
OVALOCYTES BLD QL SMEAR: NORMAL
PLATELET # BLD AUTO: 209 K/UL (ref 164–446)
PLATELET BLD QL SMEAR: NORMAL
PMV BLD AUTO: 9.6 FL (ref 9–12.9)
POIKILOCYTOSIS BLD QL SMEAR: NORMAL
POTASSIUM SERPL-SCNC: 4.2 MMOL/L (ref 3.6–5.5)
PROT SERPL-MCNC: 6.6 G/DL (ref 6–8.2)
RBC # BLD AUTO: 5.19 M/UL (ref 4.2–5.4)
RBC BLD AUTO: PRESENT
SCHISTOCYTES BLD QL SMEAR: NORMAL
SODIUM SERPL-SCNC: 140 MMOL/L (ref 135–145)
T4 FREE SERPL-MCNC: 0.75 NG/DL (ref 0.53–1.43)
TSH SERPL DL<=0.005 MIU/L-ACNC: 2.4 UIU/ML (ref 0.38–5.33)
WBC # BLD AUTO: 4.4 K/UL (ref 4.8–10.8)

## 2019-12-28 PROCEDURE — 85025 COMPLETE CBC W/AUTO DIFF WBC: CPT

## 2019-12-28 PROCEDURE — 84443 ASSAY THYROID STIM HORMONE: CPT

## 2019-12-28 PROCEDURE — 36415 COLL VENOUS BLD VENIPUNCTURE: CPT

## 2019-12-28 PROCEDURE — 82024 ASSAY OF ACTH: CPT

## 2019-12-28 PROCEDURE — 84439 ASSAY OF FREE THYROXINE: CPT

## 2019-12-28 PROCEDURE — 80053 COMPREHEN METABOLIC PANEL: CPT

## 2019-12-28 NOTE — PROGRESS NOTES
Pt arrived ambulatory to IS for labs prior to Keytruda infusion.  POC discussed.  Labs drawn peripherally and sent for processing.  Follow up appointment confirmed.  Pt discharged from IS in NAD under self care.

## 2019-12-29 ENCOUNTER — OUTPATIENT INFUSION SERVICES (OUTPATIENT)
Dept: ONCOLOGY | Facility: MEDICAL CENTER | Age: 69
End: 2019-12-29
Attending: INTERNAL MEDICINE
Payer: COMMERCIAL

## 2019-12-29 VITALS
DIASTOLIC BLOOD PRESSURE: 62 MMHG | HEIGHT: 62 IN | OXYGEN SATURATION: 96 % | BODY MASS INDEX: 32.7 KG/M2 | TEMPERATURE: 98.9 F | HEART RATE: 84 BPM | SYSTOLIC BLOOD PRESSURE: 113 MMHG | WEIGHT: 177.69 LBS | RESPIRATION RATE: 18 BRPM

## 2019-12-29 DIAGNOSIS — C22.1 CHOLANGIOCARCINOMA (HCC): ICD-10-CM

## 2019-12-29 PROCEDURE — 700105 HCHG RX REV CODE 258: Performed by: NURSE PRACTITIONER

## 2019-12-29 PROCEDURE — 700111 HCHG RX REV CODE 636 W/ 250 OVERRIDE (IP): Performed by: NURSE PRACTITIONER

## 2019-12-29 RX ORDER — SODIUM CHLORIDE 9 MG/ML
INJECTION, SOLUTION INTRAVENOUS CONTINUOUS
Status: DISCONTINUED | OUTPATIENT
Start: 2019-12-29 | End: 2019-12-29 | Stop reason: HOSPADM

## 2019-12-29 RX ADMIN — SODIUM CHLORIDE: 9 INJECTION, SOLUTION INTRAVENOUS at 09:29

## 2019-12-29 RX ADMIN — SODIUM CHLORIDE 200 MG: 9 INJECTION, SOLUTION INTRAVENOUS at 09:29

## 2019-12-29 NOTE — PROGRESS NOTES
"Pharmacy Chemotherapy Verification    Patient name: Gigi Pagan  Dx: cholangiocarcinoma         Protocol: Keytruda      *Dosing Reference*  Pembrolizumab 200 mg IV over 30 min on Day 1  21-day cycle until disease progression or unacceptable toxicity  NCCN Guidelines for Hepatobiliary Cancers V.1.2019  Disha REID et al. J Clin Oncol. 2017:35(15_suppl):[id number]  Yg YJ, et al. Eur J Cancer. 2015;51(3_suppl):S112    Allergies:  Gadolinium and Tape     /62   Pulse 84   Temp 37.2 °C (98.9 °F) (Temporal)   Resp 18   Ht 1.58 m (5' 2.21\")   Wt 80.6 kg (177 lb 11.1 oz)   SpO2 96%   BMI 32.29 kg/m²  Body surface area is 1.88 meters squared.    Labs 12/28/19  ANC 2510 Hgb 10.8 Plt 209k  SCr 0.59 CrCl 98.7 mL/min   AST/ALT/AP = 18/7/66 Tbili = 0.7  TSH = 2.4 Free T4 = 0.75     Drug Order   (Drug name, dose, route, IV Fluid & volume, frequency, number of doses) Cycle 5  Previous treatment: C4 on 12/8/19      Medication = Pembrolizumab (Keytruda)  Base Dose = 200 mg fixed dose  Calc Dose: no calculation required  Final Dose = 200 mg  Route = IV  Fluid & Volume = NS 50 mL  Admin Duration = Over 30 minutes          <10% difference, okay to treat with final dose     By my signature below, I confirm this process was performed independently with the BSA and all final chemotherapy dosing calculations congruent. I have reviewed the above chemotherapy order and that my calculation of the final dose and BSA (when applicable) corroborate those calculations of the  pharmacist. Discrepancies of 10% or greater in the written dose have been addressed and documented within the [facility] Progress notes.    Katherine Chandler, PharmD, BCOP      "

## 2019-12-29 NOTE — PROGRESS NOTES
"Pharmacy Chemotherapy Verification    Dx: Cholangiocarcinoma  Cycle:  5 Previous treatment: 12/8/19    Protocol: Keytruda  Pembrolizumab (Keytruda) 200 mg IV on Day 1  21-day cycle until disease progression or unacceptable toxicity  NCCN Guidelines for Hepatobiliary Cancers. V.1.2019.  Disha REID et al. JCO. 2017;35(15_suppl):abstr 2512.  Yg YJ, et al. Eur J Cancer. 2015;51(3_suppl):S112.         Allergies:Gadolinium and Tape  /62   Pulse 84   Temp 37.2 °C (98.9 °F) (Temporal)   Resp 18   Ht 1.58 m (5' 2.21\")   Wt 80.6 kg (177 lb 11.1 oz)   SpO2 96%   BMI 32.29 kg/m²  Body surface area is 1.88 meters squared.     All lab results 12/28/19 within treatment parameters.       Pembrolizumab 200 mg fixed dose   No calculation required, ok for final dose = 200 mg IV    Dania Lea, PharmD    "

## 2019-12-29 NOTE — PROGRESS NOTES
Chemotherapy Verification - PRIMARY RN      Height = 62.21in  Weight = 80.6kg  BSA = 1.87m2       Medication: Keytruda  Dose: 200mg  Calculated Dose: 200mg set dose                             (In mg/m2, AUC, mg/kg)       I confirm this process was performed independently with the BSA and all final chemotherapy dosing calculations congruent.  Any discrepancies of 10% or greater have been addressed with the chemotherapy pharmacist. The resolution of the discrepancy has been documented in the EPIC progress notes.

## 2019-12-29 NOTE — PROGRESS NOTES
Chemotherapy Verification - SECONDARY RN     D1C5    Height = 158cm  Weight = 80.6kg  BSA = 1.88m2       Medication: Keytruda  Dose: Set Dose  Calculated Dose: 200mg Set Dose                             I confirm that this process was performed independently.

## 2019-12-29 NOTE — PROGRESS NOTES
Patient arrived ambulatory to the [facility] for C5D1 of Keytruda. Reviewed vital signs, labs, and physician order. Patient denies S&S of infection. IV access established in right hand, visualized brisk blood return. Keytruda administered, no adverse reaction observed. IV flushed per protocol, catheter removed with tip intact, gauze and coban dressing placed. Confirmed upcoming appointment date and time with patient. Patient left the [facility] ambulatory in no sign of distress.

## 2019-12-30 LAB — ACTH PLAS-MCNC: 5.6 PG/ML (ref 7.2–63.3)

## 2020-01-18 ENCOUNTER — OUTPATIENT INFUSION SERVICES (OUTPATIENT)
Dept: ONCOLOGY | Facility: MEDICAL CENTER | Age: 70
End: 2020-01-18
Attending: INTERNAL MEDICINE
Payer: COMMERCIAL

## 2020-01-18 VITALS
TEMPERATURE: 98.8 F | SYSTOLIC BLOOD PRESSURE: 111 MMHG | HEIGHT: 63 IN | OXYGEN SATURATION: 97 % | WEIGHT: 177.25 LBS | RESPIRATION RATE: 18 BRPM | DIASTOLIC BLOOD PRESSURE: 54 MMHG | BODY MASS INDEX: 31.41 KG/M2 | HEART RATE: 79 BPM

## 2020-01-18 DIAGNOSIS — C22.1 CHOLANGIOCARCINOMA (HCC): ICD-10-CM

## 2020-01-18 LAB
ALBUMIN SERPL BCP-MCNC: 3.8 G/DL (ref 3.2–4.9)
ALBUMIN/GLOB SERPL: 1.7 G/DL
ALP SERPL-CCNC: 57 U/L (ref 30–99)
ALT SERPL-CCNC: 5 U/L (ref 2–50)
ANION GAP SERPL CALC-SCNC: 6 MMOL/L (ref 0–11.9)
ANISOCYTOSIS BLD QL SMEAR: ABNORMAL
AST SERPL-CCNC: 15 U/L (ref 12–45)
BASOPHILS # BLD AUTO: 0.9 % (ref 0–1.8)
BASOPHILS # BLD: 0.03 K/UL (ref 0–0.12)
BILIRUB SERPL-MCNC: 0.5 MG/DL (ref 0.1–1.5)
BUN SERPL-MCNC: 12 MG/DL (ref 8–22)
CALCIUM SERPL-MCNC: 9.2 MG/DL (ref 8.5–10.5)
CHLORIDE SERPL-SCNC: 109 MMOL/L (ref 96–112)
CO2 SERPL-SCNC: 26 MMOL/L (ref 20–33)
CREAT SERPL-MCNC: 0.73 MG/DL (ref 0.5–1.4)
EOSINOPHIL # BLD AUTO: 0.03 K/UL (ref 0–0.51)
EOSINOPHIL NFR BLD: 0.9 % (ref 0–6.9)
ERYTHROCYTE [DISTWIDTH] IN BLOOD BY AUTOMATED COUNT: 54.6 FL (ref 35.9–50)
GLOBULIN SER CALC-MCNC: 2.3 G/DL (ref 1.9–3.5)
GLUCOSE SERPL-MCNC: 104 MG/DL (ref 65–99)
HCT VFR BLD AUTO: 37.2 % (ref 37–47)
HGB BLD-MCNC: 10.6 G/DL (ref 12–16)
HYPOCHROMIA BLD QL SMEAR: ABNORMAL
LYMPHOCYTES # BLD AUTO: 1.21 K/UL (ref 1–4.8)
LYMPHOCYTES NFR BLD: 33.6 % (ref 22–41)
MANUAL DIFF BLD: NORMAL
MCH RBC QN AUTO: 20.9 PG (ref 27–33)
MCHC RBC AUTO-ENTMCNC: 28.5 G/DL (ref 33.6–35)
MCV RBC AUTO: 73.2 FL (ref 81.4–97.8)
METAMYELOCYTES NFR BLD MANUAL: 0.9 %
MICROCYTES BLD QL SMEAR: ABNORMAL
MONOCYTES # BLD AUTO: 0.03 K/UL (ref 0–0.85)
MONOCYTES NFR BLD AUTO: 0.9 % (ref 0–13.4)
MORPHOLOGY BLD-IMP: NORMAL
NEUTROPHILS # BLD AUTO: 2.26 K/UL (ref 2–7.15)
NEUTROPHILS NFR BLD: 60.2 % (ref 44–72)
NEUTS BAND NFR BLD MANUAL: 2.6 % (ref 0–10)
NRBC # BLD AUTO: 0 K/UL
NRBC BLD-RTO: 0 /100 WBC
OVALOCYTES BLD QL SMEAR: NORMAL
PLATELET # BLD AUTO: 207 K/UL (ref 164–446)
PLATELET BLD QL SMEAR: NORMAL
PMV BLD AUTO: 9.5 FL (ref 9–12.9)
POIKILOCYTOSIS BLD QL SMEAR: NORMAL
POTASSIUM SERPL-SCNC: 4 MMOL/L (ref 3.6–5.5)
PROT SERPL-MCNC: 6.1 G/DL (ref 6–8.2)
RBC # BLD AUTO: 5.08 M/UL (ref 4.2–5.4)
RBC BLD AUTO: PRESENT
SODIUM SERPL-SCNC: 141 MMOL/L (ref 135–145)
T4 FREE SERPL-MCNC: 0.97 NG/DL (ref 0.53–1.43)
TSH SERPL DL<=0.005 MIU/L-ACNC: 0.49 UIU/ML (ref 0.38–5.33)
WBC # BLD AUTO: 3.6 K/UL (ref 4.8–10.8)

## 2020-01-18 PROCEDURE — 84439 ASSAY OF FREE THYROXINE: CPT

## 2020-01-18 PROCEDURE — 82024 ASSAY OF ACTH: CPT

## 2020-01-18 PROCEDURE — 36415 COLL VENOUS BLD VENIPUNCTURE: CPT

## 2020-01-18 PROCEDURE — 84443 ASSAY THYROID STIM HORMONE: CPT

## 2020-01-18 PROCEDURE — 85007 BL SMEAR W/DIFF WBC COUNT: CPT

## 2020-01-18 PROCEDURE — 80053 COMPREHEN METABOLIC PANEL: CPT

## 2020-01-18 PROCEDURE — 85027 COMPLETE CBC AUTOMATED: CPT

## 2020-01-18 RX ORDER — 0.9 % SODIUM CHLORIDE 0.9 %
5 VIAL (ML) INJECTION PRN
Status: CANCELLED | OUTPATIENT
Start: 2020-01-18

## 2020-01-18 RX ORDER — 0.9 % SODIUM CHLORIDE 0.9 %
VIAL (ML) INJECTION PRN
Status: CANCELLED | OUTPATIENT
Start: 2020-01-18

## 2020-01-18 NOTE — PROGRESS NOTES
Patient arrived ambulatory to [facility] for pre chemo lab draw.  Labs drawn per order, gauze/coban placed. Pt tolerated well.  Confirmed appointment for tomorrow and pt ambulated out of clinic in no apparent distress.

## 2020-01-19 ENCOUNTER — OUTPATIENT INFUSION SERVICES (OUTPATIENT)
Dept: ONCOLOGY | Facility: MEDICAL CENTER | Age: 70
End: 2020-01-19
Attending: INTERNAL MEDICINE
Payer: COMMERCIAL

## 2020-01-19 VITALS
SYSTOLIC BLOOD PRESSURE: 105 MMHG | RESPIRATION RATE: 18 BRPM | OXYGEN SATURATION: 97 % | WEIGHT: 178.35 LBS | TEMPERATURE: 99.3 F | HEART RATE: 87 BPM | BODY MASS INDEX: 31.6 KG/M2 | HEIGHT: 63 IN | DIASTOLIC BLOOD PRESSURE: 49 MMHG

## 2020-01-19 PROCEDURE — 306780 HCHG STAT FOR TRANSFUSION PER CASE

## 2020-01-19 NOTE — PROGRESS NOTES
Pt presents to clinic for Keytruda infusion. MD notified of unsigned orders. Dr. Pinto stated pt needs to be evaluated by physician prior to receiving next dose of Keytruda. Treatment held for today and pt verbalized understanding. Pt discharged in stable, ambulatory condition.

## 2020-01-20 LAB — ACTH PLAS-MCNC: 10.6 PG/ML (ref 7.2–63.3)

## 2020-01-28 RX ORDER — ONDANSETRON 8 MG/1
8 TABLET, ORALLY DISINTEGRATING ORAL PRN
Status: CANCELLED | OUTPATIENT
Start: 2020-02-09

## 2020-01-28 RX ORDER — 0.9 % SODIUM CHLORIDE 0.9 %
VIAL (ML) INJECTION PRN
Status: CANCELLED | OUTPATIENT
Start: 2020-02-09

## 2020-01-28 RX ORDER — 0.9 % SODIUM CHLORIDE 0.9 %
VIAL (ML) INJECTION PRN
Status: CANCELLED | OUTPATIENT
Start: 2020-02-08

## 2020-01-28 RX ORDER — ONDANSETRON 2 MG/ML
4 INJECTION INTRAMUSCULAR; INTRAVENOUS PRN
Status: CANCELLED | OUTPATIENT
Start: 2020-02-09

## 2020-01-28 RX ORDER — METHYLPREDNISOLONE SODIUM SUCCINATE 125 MG/2ML
125 INJECTION, POWDER, LYOPHILIZED, FOR SOLUTION INTRAMUSCULAR; INTRAVENOUS PRN
Status: CANCELLED | OUTPATIENT
Start: 2020-02-09

## 2020-01-28 RX ORDER — SODIUM CHLORIDE 9 MG/ML
INJECTION, SOLUTION INTRAVENOUS CONTINUOUS
Status: CANCELLED | OUTPATIENT
Start: 2020-02-09

## 2020-01-28 RX ORDER — 0.9 % SODIUM CHLORIDE 0.9 %
5 VIAL (ML) INJECTION PRN
Status: CANCELLED | OUTPATIENT
Start: 2020-02-08

## 2020-01-28 RX ORDER — PROCHLORPERAZINE MALEATE 10 MG
10 TABLET ORAL EVERY 6 HOURS PRN
Status: CANCELLED | OUTPATIENT
Start: 2020-02-09

## 2020-01-28 RX ORDER — DIPHENHYDRAMINE HYDROCHLORIDE 50 MG/ML
50 INJECTION INTRAMUSCULAR; INTRAVENOUS PRN
Status: CANCELLED | OUTPATIENT
Start: 2020-02-09

## 2020-01-28 RX ORDER — 0.9 % SODIUM CHLORIDE 0.9 %
5 VIAL (ML) INJECTION PRN
Status: CANCELLED | OUTPATIENT
Start: 2020-02-09

## 2020-01-28 RX ORDER — EPINEPHRINE 1 MG/ML(1)
0.5 AMPUL (ML) INJECTION PRN
Status: CANCELLED | OUTPATIENT
Start: 2020-02-09

## 2020-02-08 ENCOUNTER — APPOINTMENT (OUTPATIENT)
Dept: ONCOLOGY | Facility: MEDICAL CENTER | Age: 70
End: 2020-02-08
Attending: INTERNAL MEDICINE
Payer: COMMERCIAL

## 2020-02-09 ENCOUNTER — OUTPATIENT INFUSION SERVICES (OUTPATIENT)
Dept: ONCOLOGY | Facility: MEDICAL CENTER | Age: 70
End: 2020-02-09
Attending: INTERNAL MEDICINE
Payer: COMMERCIAL

## 2020-02-09 VITALS
HEART RATE: 85 BPM | WEIGHT: 181 LBS | RESPIRATION RATE: 18 BRPM | SYSTOLIC BLOOD PRESSURE: 113 MMHG | BODY MASS INDEX: 32.07 KG/M2 | OXYGEN SATURATION: 96 % | HEIGHT: 63 IN | DIASTOLIC BLOOD PRESSURE: 53 MMHG | TEMPERATURE: 97 F

## 2020-02-09 DIAGNOSIS — C22.1 CHOLANGIOCARCINOMA (HCC): ICD-10-CM

## 2020-02-09 LAB
ALBUMIN SERPL BCP-MCNC: 3.4 G/DL (ref 3.2–4.9)
ALBUMIN/GLOB SERPL: 1.5 G/DL
ALP SERPL-CCNC: 54 U/L (ref 30–99)
ALT SERPL-CCNC: 5 U/L (ref 2–50)
ANION GAP SERPL CALC-SCNC: 4 MMOL/L (ref 0–11.9)
ANISOCYTOSIS BLD QL SMEAR: ABNORMAL
AST SERPL-CCNC: 21 U/L (ref 12–45)
BASOPHILS # BLD AUTO: 0.8 % (ref 0–1.8)
BASOPHILS # BLD: 0.03 K/UL (ref 0–0.12)
BILIRUB SERPL-MCNC: 0.5 MG/DL (ref 0.1–1.5)
BUN SERPL-MCNC: 18 MG/DL (ref 8–22)
BURR CELLS BLD QL SMEAR: NORMAL
CALCIUM SERPL-MCNC: 8.7 MG/DL (ref 8.5–10.5)
CHLORIDE SERPL-SCNC: 111 MMOL/L (ref 96–112)
CO2 SERPL-SCNC: 25 MMOL/L (ref 20–33)
COMMENT 1642: NORMAL
CREAT SERPL-MCNC: 0.68 MG/DL (ref 0.5–1.4)
EOSINOPHIL # BLD AUTO: 0.11 K/UL (ref 0–0.51)
EOSINOPHIL NFR BLD: 3 % (ref 0–6.9)
ERYTHROCYTE [DISTWIDTH] IN BLOOD BY AUTOMATED COUNT: 62.8 FL (ref 35.9–50)
GLOBULIN SER CALC-MCNC: 2.2 G/DL (ref 1.9–3.5)
GLUCOSE SERPL-MCNC: 95 MG/DL (ref 65–99)
HCT VFR BLD AUTO: 34.9 % (ref 37–47)
HGB BLD-MCNC: 9.7 G/DL (ref 12–16)
HYPOCHROMIA BLD QL SMEAR: ABNORMAL
IMM GRANULOCYTES # BLD AUTO: 0.01 K/UL (ref 0–0.11)
IMM GRANULOCYTES NFR BLD AUTO: 0.3 % (ref 0–0.9)
LYMPHOCYTES # BLD AUTO: 1.61 K/UL (ref 1–4.8)
LYMPHOCYTES NFR BLD: 43.3 % (ref 22–41)
MCH RBC QN AUTO: 21.1 PG (ref 27–33)
MCHC RBC AUTO-ENTMCNC: 27.8 G/DL (ref 33.6–35)
MCV RBC AUTO: 75.9 FL (ref 81.4–97.8)
MICROCYTES BLD QL SMEAR: ABNORMAL
MONOCYTES # BLD AUTO: 0.34 K/UL (ref 0–0.85)
MONOCYTES NFR BLD AUTO: 9.1 % (ref 0–13.4)
MORPHOLOGY BLD-IMP: NORMAL
NEUTROPHILS # BLD AUTO: 1.62 K/UL (ref 2–7.15)
NEUTROPHILS NFR BLD: 43.5 % (ref 44–72)
NRBC # BLD AUTO: 0 K/UL
NRBC BLD-RTO: 0 /100 WBC
OVALOCYTES BLD QL SMEAR: NORMAL
PLATELET # BLD AUTO: 188 K/UL (ref 164–446)
PLATELET BLD QL SMEAR: NORMAL
POIKILOCYTOSIS BLD QL SMEAR: NORMAL
POTASSIUM SERPL-SCNC: 4.8 MMOL/L (ref 3.6–5.5)
PROT SERPL-MCNC: 5.6 G/DL (ref 6–8.2)
RBC # BLD AUTO: 4.6 M/UL (ref 4.2–5.4)
RBC BLD AUTO: PRESENT
SODIUM SERPL-SCNC: 140 MMOL/L (ref 135–145)
T4 FREE SERPL-MCNC: 0.88 NG/DL (ref 0.53–1.43)
TARGETS BLD QL SMEAR: NORMAL
TSH SERPL DL<=0.005 MIU/L-ACNC: 1.02 UIU/ML (ref 0.38–5.33)
WBC # BLD AUTO: 3.7 K/UL (ref 4.8–10.8)

## 2020-02-09 PROCEDURE — 700105 HCHG RX REV CODE 258: Performed by: INTERNAL MEDICINE

## 2020-02-09 PROCEDURE — 80053 COMPREHEN METABOLIC PANEL: CPT

## 2020-02-09 PROCEDURE — 84439 ASSAY OF FREE THYROXINE: CPT

## 2020-02-09 PROCEDURE — 85025 COMPLETE CBC W/AUTO DIFF WBC: CPT

## 2020-02-09 PROCEDURE — 700111 HCHG RX REV CODE 636 W/ 250 OVERRIDE (IP): Performed by: INTERNAL MEDICINE

## 2020-02-09 PROCEDURE — 96413 CHEMO IV INFUSION 1 HR: CPT

## 2020-02-09 PROCEDURE — 84443 ASSAY THYROID STIM HORMONE: CPT

## 2020-02-09 PROCEDURE — 82024 ASSAY OF ACTH: CPT

## 2020-02-09 RX ORDER — SODIUM CHLORIDE 9 MG/ML
INJECTION, SOLUTION INTRAVENOUS CONTINUOUS
Status: DISCONTINUED | OUTPATIENT
Start: 2020-02-09 | End: 2020-02-09 | Stop reason: HOSPADM

## 2020-02-09 RX ADMIN — SODIUM CHLORIDE 200 MG: 9 INJECTION, SOLUTION INTRAVENOUS at 10:52

## 2020-02-09 RX ADMIN — SODIUM CHLORIDE: 9 INJECTION, SOLUTION INTRAVENOUS at 10:52

## 2020-02-09 NOTE — PROGRESS NOTES
"Pharmacy Chemotherapy Calculation:    Patient Name: Gigi Pagan  Dx: Cholangiocarcinoma    Cycle 7 (C6 canceled, pt needed to be seen at MD office first)  Previous treatment: 12/29/19    Protocol: Keytruda  Pembrolizumab (Keytruda) 200 mg IV on Day 1  21-day cycle until disease progression or unacceptable toxicity  NCCN Guidelines for Hepatobiliary Cancers. V.1.2019.  Disha REID, et al. JCO. 2017;35(15_suppl):abstr 2512.  Yg ALVAREZ, et al. Eur J Cancer. 2015;51(3_suppl):S112.         Allergies:Gadolinium and Tape    /53   Pulse 85   Temp 36.1 °C (97 °F) (Temporal)   Resp 18   Ht 1.59 m (5' 2.6\")   Wt 82.1 kg (181 lb)   SpO2 96%   BMI 32.48 kg/m²  Body surface area is 1.9 meters squared.     Labs 02/09/20:   ANC ~1620 Plt = 188k Hgb = 9.7   SCr = 0.68 mg/dL CrCl ~ 100 mL/min LFTs/Tbili = WNL  TSH = 1.020 Free T-4 = 0.88      Pembrolizumab 200 mg fixed dose   No calculation required, ok for final dose = 200 mg IV      Sharon Cervantes, PharmD, BCOP    "

## 2020-02-09 NOTE — PROGRESS NOTES
Patient arrived ambulatory to the [facility] for Keytruda. Reviewed vital signs, labs, and physician order. Patient denies S&S of infection, or bleeding. IV access established in right forearm, visualized brisk blood return, labs collected per MD order. Labs within limits to receive treatment. Keytruda administered with a filter, no adverse reaction observed. IV flushed per protocol, catheter removed with tip intact, gauze and coban dressing placed. Confirmed upcoming appointment date and time with patient. Patient left the [facility] ambulatory in no sign of distress.

## 2020-02-09 NOTE — PROGRESS NOTES
Chemotherapy Verification - SECONDARY RN       Height = 62.6in  Weight = 82.1kg  BSA = 1.90m2       Medication: Keytruda  Dose: 200mg  Calculated Dose: 200mg                             (In mg/m2, AUC, mg/kg)         I confirm that this process was performed independently.

## 2020-02-09 NOTE — PROGRESS NOTES
Chemotherapy Verification - SECONDARY RN       Height = 62.6in  Weight = 82.1kg  BSA = 1.90m2       Medication: Keytruda  Dose: 200mg set dose  Calculated Dose: 200mg                             (In mg/m2, AUC, mg/kg)       I confirm that this process was performed independently.

## 2020-02-09 NOTE — PROGRESS NOTES
"Pharmacy Chemotherapy Verification    Patient name: Gigi Pagan  Dx: cholangiocarcinoma         Protocol: Keytruda   Pembrolizumab 200 mg IV over 30 min on Day 1  21-day cycle until disease progression or unacceptable toxicity  NCCN Guidelines for Hepatobiliary Cancers V.1.2019  Disha REID et al. J Clin Oncol. 2017:35(15_suppl):[id number]  Yg ALVAREZ, et al. Eur J Cancer. 2015;51(3_suppl):S112    Allergies:  Gadolinium and Tape     /53   Pulse 85   Temp 36.1 °C (97 °F) (Temporal)   Resp 18   Ht 1.59 m (5' 2.6\")   Wt 82.1 kg (181 lb)   SpO2 96%   BMI 32.48 kg/m²  Body surface area is 1.9 meters squared.    All lab results 2/9/20 within treatment parameters.        Drug Order   (Drug name, dose, route, IV Fluid & volume, frequency, number of doses) Cycle 7- cycle 6 cx- missed MD appt  Previous treatment: C5 on 12/29/19      Medication = Pembrolizumab (Keytruda)  Base Dose = 200 mg fixed dose  Calc Dose: no calculation required  Final Dose = 200 mg  Route = IV  Fluid & Volume = NS 50 mL  Admin Duration = Over 30 minutes          <10% difference, okay to treat with final dose     By my signature below, I confirm this process was performed independently with the BSA and all final chemotherapy dosing calculations congruent. I have reviewed the above chemotherapy order and that my calculation of the final dose and BSA (when applicable) corroborate those calculations of the  pharmacist. Discrepancies of 10% or greater in the written dose have been addressed and documented within the [facility] Progress notes.    PILAR Lea, Pharm.D.        "

## 2020-02-11 LAB — ACTH PLAS-MCNC: 6 PG/ML (ref 7.2–63.3)

## 2020-02-25 RX ORDER — ONDANSETRON 8 MG/1
8 TABLET, ORALLY DISINTEGRATING ORAL PRN
Status: CANCELLED | OUTPATIENT
Start: 2020-03-01

## 2020-02-25 RX ORDER — SODIUM CHLORIDE 9 MG/ML
INJECTION, SOLUTION INTRAVENOUS CONTINUOUS
Status: CANCELLED | OUTPATIENT
Start: 2020-03-01

## 2020-02-25 RX ORDER — 0.9 % SODIUM CHLORIDE 0.9 %
VIAL (ML) INJECTION PRN
Status: CANCELLED | OUTPATIENT
Start: 2020-02-29

## 2020-02-25 RX ORDER — 0.9 % SODIUM CHLORIDE 0.9 %
VIAL (ML) INJECTION PRN
Status: CANCELLED | OUTPATIENT
Start: 2020-03-01

## 2020-02-25 RX ORDER — 0.9 % SODIUM CHLORIDE 0.9 %
5 VIAL (ML) INJECTION PRN
Status: CANCELLED | OUTPATIENT
Start: 2020-02-29

## 2020-02-25 RX ORDER — DIPHENHYDRAMINE HYDROCHLORIDE 50 MG/ML
50 INJECTION INTRAMUSCULAR; INTRAVENOUS PRN
Status: CANCELLED | OUTPATIENT
Start: 2020-03-01

## 2020-02-25 RX ORDER — PROCHLORPERAZINE MALEATE 10 MG
10 TABLET ORAL EVERY 6 HOURS PRN
Status: CANCELLED | OUTPATIENT
Start: 2020-03-01

## 2020-02-25 RX ORDER — ONDANSETRON 2 MG/ML
4 INJECTION INTRAMUSCULAR; INTRAVENOUS PRN
Status: CANCELLED | OUTPATIENT
Start: 2020-03-01

## 2020-02-25 RX ORDER — EPINEPHRINE 1 MG/ML(1)
0.5 AMPUL (ML) INJECTION PRN
Status: CANCELLED | OUTPATIENT
Start: 2020-03-01

## 2020-02-25 RX ORDER — METHYLPREDNISOLONE SODIUM SUCCINATE 125 MG/2ML
125 INJECTION, POWDER, LYOPHILIZED, FOR SOLUTION INTRAMUSCULAR; INTRAVENOUS PRN
Status: CANCELLED | OUTPATIENT
Start: 2020-03-01

## 2020-02-25 RX ORDER — 0.9 % SODIUM CHLORIDE 0.9 %
5 VIAL (ML) INJECTION PRN
Status: CANCELLED | OUTPATIENT
Start: 2020-03-01

## 2020-03-01 ENCOUNTER — OUTPATIENT INFUSION SERVICES (OUTPATIENT)
Dept: ONCOLOGY | Facility: MEDICAL CENTER | Age: 70
End: 2020-03-01
Attending: INTERNAL MEDICINE
Payer: COMMERCIAL

## 2020-03-01 VITALS
DIASTOLIC BLOOD PRESSURE: 55 MMHG | SYSTOLIC BLOOD PRESSURE: 114 MMHG | BODY MASS INDEX: 32.11 KG/M2 | HEIGHT: 63 IN | TEMPERATURE: 98.5 F | HEART RATE: 73 BPM | OXYGEN SATURATION: 98 % | WEIGHT: 181.22 LBS | RESPIRATION RATE: 18 BRPM

## 2020-03-01 DIAGNOSIS — C22.1 CHOLANGIOCARCINOMA (HCC): ICD-10-CM

## 2020-03-01 LAB
ALBUMIN SERPL BCP-MCNC: 3.7 G/DL (ref 3.2–4.9)
ALBUMIN/GLOB SERPL: 1.5 G/DL
ALP SERPL-CCNC: 57 U/L (ref 30–99)
ALT SERPL-CCNC: 5 U/L (ref 2–50)
ANION GAP SERPL CALC-SCNC: 5 MMOL/L (ref 0–11.9)
AST SERPL-CCNC: 16 U/L (ref 12–45)
BASOPHILS # BLD AUTO: 1.1 % (ref 0–1.8)
BASOPHILS # BLD: 0.04 K/UL (ref 0–0.12)
BILIRUB SERPL-MCNC: 0.4 MG/DL (ref 0.1–1.5)
BUN SERPL-MCNC: 17 MG/DL (ref 8–22)
CALCIUM SERPL-MCNC: 9.2 MG/DL (ref 8.5–10.5)
CHLORIDE SERPL-SCNC: 109 MMOL/L (ref 96–112)
CO2 SERPL-SCNC: 24 MMOL/L (ref 20–33)
CREAT SERPL-MCNC: 0.59 MG/DL (ref 0.5–1.4)
EOSINOPHIL # BLD AUTO: 0.07 K/UL (ref 0–0.51)
EOSINOPHIL NFR BLD: 2 % (ref 0–6.9)
ERYTHROCYTE [DISTWIDTH] IN BLOOD BY AUTOMATED COUNT: 53.7 FL (ref 35.9–50)
GLOBULIN SER CALC-MCNC: 2.5 G/DL (ref 1.9–3.5)
GLUCOSE SERPL-MCNC: 89 MG/DL (ref 65–99)
HCT VFR BLD AUTO: 34.8 % (ref 37–47)
HGB BLD-MCNC: 10.5 G/DL (ref 12–16)
IMM GRANULOCYTES # BLD AUTO: 0 K/UL (ref 0–0.11)
IMM GRANULOCYTES NFR BLD AUTO: 0 % (ref 0–0.9)
LYMPHOCYTES # BLD AUTO: 1.37 K/UL (ref 1–4.8)
LYMPHOCYTES NFR BLD: 38.8 % (ref 22–41)
MCH RBC QN AUTO: 22.6 PG (ref 27–33)
MCHC RBC AUTO-ENTMCNC: 30.2 G/DL (ref 33.6–35)
MCV RBC AUTO: 74.8 FL (ref 81.4–97.8)
MONOCYTES # BLD AUTO: 0.32 K/UL (ref 0–0.85)
MONOCYTES NFR BLD AUTO: 9.1 % (ref 0–13.4)
NEUTROPHILS # BLD AUTO: 1.73 K/UL (ref 2–7.15)
NEUTROPHILS NFR BLD: 49 % (ref 44–72)
NRBC # BLD AUTO: 0 K/UL
NRBC BLD-RTO: 0 /100 WBC
PLATELET # BLD AUTO: 189 K/UL (ref 164–446)
PMV BLD AUTO: 9.8 FL (ref 9–12.9)
POTASSIUM SERPL-SCNC: 4 MMOL/L (ref 3.6–5.5)
PROT SERPL-MCNC: 6.2 G/DL (ref 6–8.2)
RBC # BLD AUTO: 4.65 M/UL (ref 4.2–5.4)
SODIUM SERPL-SCNC: 138 MMOL/L (ref 135–145)
T4 FREE SERPL-MCNC: 0.78 NG/DL (ref 0.53–1.43)
TSH SERPL DL<=0.005 MIU/L-ACNC: 1.08 UIU/ML (ref 0.38–5.33)
WBC # BLD AUTO: 3.5 K/UL (ref 4.8–10.8)

## 2020-03-01 PROCEDURE — 96413 CHEMO IV INFUSION 1 HR: CPT

## 2020-03-01 PROCEDURE — 84439 ASSAY OF FREE THYROXINE: CPT

## 2020-03-01 PROCEDURE — 82024 ASSAY OF ACTH: CPT

## 2020-03-01 PROCEDURE — 85025 COMPLETE CBC W/AUTO DIFF WBC: CPT

## 2020-03-01 PROCEDURE — 84443 ASSAY THYROID STIM HORMONE: CPT

## 2020-03-01 PROCEDURE — 80053 COMPREHEN METABOLIC PANEL: CPT

## 2020-03-01 PROCEDURE — 700111 HCHG RX REV CODE 636 W/ 250 OVERRIDE (IP): Performed by: INTERNAL MEDICINE

## 2020-03-01 PROCEDURE — 700105 HCHG RX REV CODE 258: Performed by: INTERNAL MEDICINE

## 2020-03-01 RX ORDER — SODIUM CHLORIDE 9 MG/ML
INJECTION, SOLUTION INTRAVENOUS CONTINUOUS
Status: DISCONTINUED | OUTPATIENT
Start: 2020-03-01 | End: 2020-03-01 | Stop reason: HOSPADM

## 2020-03-01 RX ADMIN — SODIUM CHLORIDE 200 MG: 9 INJECTION, SOLUTION INTRAVENOUS at 10:00

## 2020-03-01 RX ADMIN — SODIUM CHLORIDE: 9 INJECTION, SOLUTION INTRAVENOUS at 09:59

## 2020-03-01 ASSESSMENT — FIBROSIS 4 INDEX: FIB4 SCORE: 3.35

## 2020-03-01 NOTE — PROGRESS NOTES
Chemotherapy Verification - SECONDARY RN       Height = 159 cm  Weight = 82.2 kg  BSA = 1.91 m2       Medication: Keytruda  Dose: 200 mg set dose  Calculated Dose: 200 mg                             (In mg/m2, AUC, mg/kg)       I confirm that this process was performed independently.

## 2020-03-01 NOTE — PROGRESS NOTES
Chemotherapy Verification - PRIMARY RN      Height = 62.6in  Weight = 82.2kg  BSA = 1.90m2      Medication: Keytruda  Dose: 200mg  Calculated Dose: 200mg set dose                             (In mg/m2, AUC, mg/kg)     I confirm this process was performed independently with the BSA and all final chemotherapy dosing calculations congruent.  Any discrepancies of 10% or greater have been addressed with the chemotherapy pharmacist. The resolution of the discrepancy has been documented in the EPIC progress notes.

## 2020-03-01 NOTE — PROGRESS NOTES
Patient arrived ambulatory to the [facility] for Keytruda. Reviewed vital signs, labs, and physician order. Patient denies S&S of infection, or bleeding. IV access established in left forearm, visualized brisk blood return, labs collected per MD order. Lab within limits to proceed with treatment.  Keytruda administered with a filter, no adverse reaction observed. IV flushed per protocol, catheter removed with tip intact, gauze and coban dressing placed. Confirmed upcoming appointment date and time with patient. Patient left the [facility] ambulatory in no sign of distress.

## 2020-03-01 NOTE — PROGRESS NOTES
"Pharmacy Chemotherapy Verification    Patient name: Gigi Pagan  Dx: cholangiocarcinoma         Protocol: Keytruda   Pembrolizumab 200 mg IV over 30 min on Day 1  21-day cycle until disease progression or unacceptable toxicity  NCCN Guidelines for Hepatobiliary Cancers V.1.2019  Disha REID et al. J Clin Oncol. 2017:35(15_suppl):[id number]  Yg CARVAJALJ, et al. Eur J Cancer. 2015;51(3_suppl):S112    Allergies:  Gadolinium and Tape       /55   Pulse 73   Temp 36.9 °C (98.5 °F) (Temporal)   Resp 18   Ht 1.59 m (5' 2.6\")   Wt 82.2 kg (181 lb 3.5 oz)   SpO2 98%   BMI 32.51 kg/m²  Body surface area is 1.91 meters squared.    Labs 03/01/20:   ANC ~1700     Plt = 189k        Hgb = 10.5         SCr = 0.59 mg/dL        CrCl ~ 100 mL/min      LFTs/Tbili = WNL        TSH = 1.080    Free T-4 = 0.78       Drug Order   (Drug name, dose, route, IV Fluid & volume, frequency, number of doses) Cycle 8  Previous treatment: C7 on 02/09/20     Medication = Pembrolizumab (Keytruda)  Base Dose = 200 mg fixed dose  Calc Dose: no calculation required  Final Dose = 200 mg  Route = IV  Fluid & Volume = NS 50 mL  Admin Duration = Over 30 minutes          <10% difference, okay to treat with final dose     By my signature below, I confirm this process was performed independently with the BSA and all final chemotherapy dosing calculations congruent. I have reviewed the above chemotherapy order and that my calculation of the final dose and BSA (when applicable) corroborate those calculations of the  pharmacist. Discrepancies of 10% or greater in the written dose have been addressed and documented within the [facility] Progress notes.    Sharon Cervantes, PharmD, BCOP          "

## 2020-03-01 NOTE — PROGRESS NOTES
"Pharmacy Chemotherapy Calculation:    Patient Name: Gigi Pagan  Dx: Cholangiocarcinoma    Cycle 8   Previous treatment: 2/9/20    Protocol: Keytruda  Pembrolizumab (Keytruda) 200 mg IV on Day 1  21-day cycle until disease progression or unacceptable toxicity  NCCN Guidelines for Hepatobiliary Cancers. V.1.2019.  Disha REID et al. JCO. 2017;35(15_suppl):abstr 2512.  Yg YJ, et al. Eur J Cancer. 2015;51(3_suppl):S112.         Allergies:Gadolinium and Tape    /55   Pulse 73   Temp 36.9 °C (98.5 °F) (Temporal)   Resp 18   Ht 1.59 m (5' 2.6\")   Wt 82.2 kg (181 lb 3.5 oz)   SpO2 98%   BMI 32.51 kg/m²  Body surface area is 1.91 meters squared.     All lab results 3/1/20 within treatment parameters.         Pembrolizumab 200 mg fixed dose   No calculation required, ok for final dose = 200 mg IV      PILAR Lea Pharm.D.      "

## 2020-03-03 LAB — ACTH PLAS-MCNC: 5.4 PG/ML (ref 7.2–63.3)

## 2020-03-20 RX ORDER — PROCHLORPERAZINE MALEATE 10 MG
10 TABLET ORAL EVERY 6 HOURS PRN
Status: CANCELLED | OUTPATIENT
Start: 2020-03-22

## 2020-03-20 RX ORDER — DIPHENHYDRAMINE HYDROCHLORIDE 50 MG/ML
50 INJECTION INTRAMUSCULAR; INTRAVENOUS PRN
Status: CANCELLED | OUTPATIENT
Start: 2020-03-22

## 2020-03-20 RX ORDER — ONDANSETRON 8 MG/1
8 TABLET, ORALLY DISINTEGRATING ORAL PRN
Status: CANCELLED | OUTPATIENT
Start: 2020-03-22

## 2020-03-20 RX ORDER — ONDANSETRON 2 MG/ML
4 INJECTION INTRAMUSCULAR; INTRAVENOUS PRN
Status: CANCELLED | OUTPATIENT
Start: 2020-03-22

## 2020-03-20 RX ORDER — 0.9 % SODIUM CHLORIDE 0.9 %
VIAL (ML) INJECTION PRN
Status: CANCELLED | OUTPATIENT
Start: 2020-03-22

## 2020-03-20 RX ORDER — METHYLPREDNISOLONE SODIUM SUCCINATE 125 MG/2ML
125 INJECTION, POWDER, LYOPHILIZED, FOR SOLUTION INTRAMUSCULAR; INTRAVENOUS PRN
Status: CANCELLED | OUTPATIENT
Start: 2020-03-22

## 2020-03-20 RX ORDER — 0.9 % SODIUM CHLORIDE 0.9 %
VIAL (ML) INJECTION PRN
Status: CANCELLED | OUTPATIENT
Start: 2020-03-21

## 2020-03-20 RX ORDER — 0.9 % SODIUM CHLORIDE 0.9 %
5 VIAL (ML) INJECTION PRN
Status: CANCELLED | OUTPATIENT
Start: 2020-03-21

## 2020-03-20 RX ORDER — EPINEPHRINE 1 MG/ML(1)
0.5 AMPUL (ML) INJECTION PRN
Status: CANCELLED | OUTPATIENT
Start: 2020-03-22

## 2020-03-20 RX ORDER — SODIUM CHLORIDE 9 MG/ML
INJECTION, SOLUTION INTRAVENOUS CONTINUOUS
Status: CANCELLED | OUTPATIENT
Start: 2020-03-22

## 2020-03-20 RX ORDER — 0.9 % SODIUM CHLORIDE 0.9 %
5 VIAL (ML) INJECTION PRN
Status: CANCELLED | OUTPATIENT
Start: 2020-03-22

## 2020-03-21 ENCOUNTER — TELEPHONE (OUTPATIENT)
Dept: ONCOLOGY | Facility: MEDICAL CENTER | Age: 70
End: 2020-03-21

## 2020-03-22 ENCOUNTER — OUTPATIENT INFUSION SERVICES (OUTPATIENT)
Dept: ONCOLOGY | Facility: MEDICAL CENTER | Age: 70
End: 2020-03-22
Attending: INTERNAL MEDICINE
Payer: COMMERCIAL

## 2020-03-22 VITALS
RESPIRATION RATE: 18 BRPM | WEIGHT: 183.42 LBS | BODY MASS INDEX: 32.5 KG/M2 | DIASTOLIC BLOOD PRESSURE: 55 MMHG | HEIGHT: 63 IN | OXYGEN SATURATION: 97 % | HEART RATE: 76 BPM | TEMPERATURE: 98 F | SYSTOLIC BLOOD PRESSURE: 120 MMHG

## 2020-03-22 DIAGNOSIS — C22.1 CHOLANGIOCARCINOMA (HCC): ICD-10-CM

## 2020-03-22 LAB
ALBUMIN SERPL BCP-MCNC: 3.8 G/DL (ref 3.2–4.9)
ALBUMIN/GLOB SERPL: 1.6 G/DL
ALP SERPL-CCNC: 71 U/L (ref 30–99)
ALT SERPL-CCNC: <5 U/L (ref 2–50)
ANION GAP SERPL CALC-SCNC: 10 MMOL/L (ref 7–16)
ANISOCYTOSIS BLD QL SMEAR: ABNORMAL
AST SERPL-CCNC: 15 U/L (ref 12–45)
BASOPHILS # BLD AUTO: 1.4 % (ref 0–1.8)
BASOPHILS # BLD: 0.05 K/UL (ref 0–0.12)
BILIRUB SERPL-MCNC: 0.4 MG/DL (ref 0.1–1.5)
BUN SERPL-MCNC: 9 MG/DL (ref 8–22)
CALCIUM SERPL-MCNC: 9 MG/DL (ref 8.5–10.5)
CHLORIDE SERPL-SCNC: 108 MMOL/L (ref 96–112)
CO2 SERPL-SCNC: 23 MMOL/L (ref 20–33)
COMMENT 1642: NORMAL
CREAT SERPL-MCNC: 0.6 MG/DL (ref 0.5–1.4)
EOSINOPHIL # BLD AUTO: 0.07 K/UL (ref 0–0.51)
EOSINOPHIL NFR BLD: 2 % (ref 0–6.9)
ERYTHROCYTE [DISTWIDTH] IN BLOOD BY AUTOMATED COUNT: 51.5 FL (ref 35.9–50)
GLOBULIN SER CALC-MCNC: 2.4 G/DL (ref 1.9–3.5)
GLUCOSE SERPL-MCNC: 87 MG/DL (ref 65–99)
HCT VFR BLD AUTO: 38.2 % (ref 37–47)
HGB BLD-MCNC: 11 G/DL (ref 12–16)
IMM GRANULOCYTES # BLD AUTO: 0 K/UL (ref 0–0.11)
IMM GRANULOCYTES NFR BLD AUTO: 0 % (ref 0–0.9)
LYMPHOCYTES # BLD AUTO: 1.3 K/UL (ref 1–4.8)
LYMPHOCYTES NFR BLD: 36.5 % (ref 22–41)
MCH RBC QN AUTO: 22 PG (ref 27–33)
MCHC RBC AUTO-ENTMCNC: 28.8 G/DL (ref 33.6–35)
MCV RBC AUTO: 76.4 FL (ref 81.4–97.8)
MICROCYTES BLD QL SMEAR: ABNORMAL
MONOCYTES # BLD AUTO: 0.28 K/UL (ref 0–0.85)
MONOCYTES NFR BLD AUTO: 7.9 % (ref 0–13.4)
MORPHOLOGY BLD-IMP: NORMAL
NEUTROPHILS # BLD AUTO: 1.86 K/UL (ref 2–7.15)
NEUTROPHILS NFR BLD: 52.2 % (ref 44–72)
NRBC # BLD AUTO: 0 K/UL
NRBC BLD-RTO: 0 /100 WBC
OVALOCYTES BLD QL SMEAR: NORMAL
PLATELET # BLD AUTO: 196 K/UL (ref 164–446)
PLATELET BLD QL SMEAR: NORMAL
PMV BLD AUTO: 9.7 FL (ref 9–12.9)
POIKILOCYTOSIS BLD QL SMEAR: NORMAL
POTASSIUM SERPL-SCNC: 4.3 MMOL/L (ref 3.6–5.5)
PROT SERPL-MCNC: 6.2 G/DL (ref 6–8.2)
RBC # BLD AUTO: 5 M/UL (ref 4.2–5.4)
RBC BLD AUTO: PRESENT
SODIUM SERPL-SCNC: 141 MMOL/L (ref 135–145)
T4 FREE SERPL-MCNC: 0.95 NG/DL (ref 0.53–1.43)
TSH SERPL DL<=0.005 MIU/L-ACNC: 1.94 UIU/ML (ref 0.38–5.33)
WBC # BLD AUTO: 3.6 K/UL (ref 4.8–10.8)

## 2020-03-22 PROCEDURE — 96413 CHEMO IV INFUSION 1 HR: CPT

## 2020-03-22 PROCEDURE — 700105 HCHG RX REV CODE 258: Performed by: INTERNAL MEDICINE

## 2020-03-22 PROCEDURE — 80053 COMPREHEN METABOLIC PANEL: CPT

## 2020-03-22 PROCEDURE — 84439 ASSAY OF FREE THYROXINE: CPT

## 2020-03-22 PROCEDURE — 85025 COMPLETE CBC W/AUTO DIFF WBC: CPT

## 2020-03-22 PROCEDURE — 84443 ASSAY THYROID STIM HORMONE: CPT

## 2020-03-22 PROCEDURE — 700111 HCHG RX REV CODE 636 W/ 250 OVERRIDE (IP): Performed by: INTERNAL MEDICINE

## 2020-03-22 PROCEDURE — 82024 ASSAY OF ACTH: CPT

## 2020-03-22 RX ORDER — ESCITALOPRAM OXALATE 5 MG/1
TABLET ORAL
COMMUNITY
Start: 2020-03-19 | End: 2022-09-18

## 2020-03-22 RX ADMIN — SODIUM CHLORIDE 200 MG: 9 INJECTION, SOLUTION INTRAVENOUS at 10:49

## 2020-03-22 ASSESSMENT — FIBROSIS 4 INDEX: FIB4 SCORE: 2.54

## 2020-03-22 NOTE — PROGRESS NOTES
Chemotherapy Verification - PRIMARY RN      Height = 1.59 m  Weight = 83.2 kg  BSA = 1.92 m2       Medication: pembrolizumab  Dose: set dose  Calculated Dose: 200 mg set dose                             (In mg/m2, AUC, mg/kg)       I confirm this process was performed independently with the BSA and all final chemotherapy dosing calculations congruent.  Any discrepancies of 10% or greater have been addressed with the chemotherapy pharmacist. The resolution of the discrepancy has been documented in the EPIC progress notes.

## 2020-03-22 NOTE — PROGRESS NOTES
Chemotherapy Verification - SECONDARY RN       Height = 159 cm  Weight = 83.2 kg  BSA = 1.92 m2       Medication: Keytruda  Dose: 200 mg set dose    Calculated Dose: 200 mg                             (In mg/m2, AUC, mg/kg)       I confirm that this process was performed independently.

## 2020-03-22 NOTE — PROGRESS NOTES
Pt ambulatory to [facility] for C9 D1 of Keytruda for cholangiocarcinoma.  Pt w/no s/s of infection, pt w/ no complaints at this time.  PIV established in left posterior hand, brisk blood return noted, labs drawn per orders, flushed per protocol.  Lab results w/n parameters for tx today.  Keytruda infused w/ filter in place w/ no adverse reactions.  PIC w/ brisk blood return post-infusion, flushed and removed, gauze and coban dressing applied. Pt left on foot in NAD.  Scheduling e-mailed to please make pt's next appt in 3 weeks.

## 2020-03-22 NOTE — PROGRESS NOTES
"Pharmacy Chemotherapy Verification    Patient name: Gigi Pagan  Dx: cholangiocarcinoma         Protocol: Keytruda   Pembrolizumab 200 mg IV over 30 min on Day 1  21-day cycle until disease progression or unacceptable toxicity  NCCN Guidelines for Hepatobiliary Cancers V.1.2019  Disha REID et al. J Clin Oncol. 2017:35(15_suppl):[id number]  Yg ALVAREZ, et al. Eur J Cancer. 2015;51(3_suppl):S112    Allergies:  Gadolinium and Tape     /55   Pulse 76   Temp 36.7 °C (98 °F) (Temporal)   Resp 18   Ht 1.59 m (5' 2.6\")   Wt 83.2 kg (183 lb 6.8 oz)   SpO2 97%   BMI 32.91 kg/m²  Body surface area is 1.92 meters squared.    Labs 3/22/20  ANC 1860 Hgb 11 Plt 196k  SCr 0.6 CrCl 101.6 mL/min   AST/ALT/AP = 15/<5/74 Tbili = 0.4  TSH = 1.94 Free T4 = 0.95    Drug Order   (Drug name, dose, route, IV Fluid & volume, frequency, number of doses) Cycle 9  Previous treatment: 3/1/20     Medication = Pembrolizumab (Keytruda)  Base Dose = 200 mg   Calc Dose: Fixed dose, no calculation required  Final Dose = 200 mg  Route = IV  Fluid & Volume = NS 50 mL  Admin Duration = Over 30 minutes          <10% difference, okay to treat with final dose     By my signature below, I confirm this process was performed independently with the BSA and all final chemotherapy dosing calculations congruent. I have reviewed the above chemotherapy order and that my calculation of the final dose and BSA (when applicable) corroborate those calculations of the  pharmacist. Discrepancies of 10% or greater in the written dose have been addressed and documented within the [facility] Progress notes.    Katherine Chandler, PharmD, BCOP            "

## 2020-03-24 LAB — ACTH PLAS-MCNC: 8.2 PG/ML (ref 7.2–63.3)

## 2020-04-03 ENCOUNTER — HOSPITAL ENCOUNTER (OUTPATIENT)
Dept: RADIOLOGY | Facility: MEDICAL CENTER | Age: 70
End: 2020-04-03
Attending: INTERNAL MEDICINE
Payer: COMMERCIAL

## 2020-04-03 DIAGNOSIS — C22.1 MALIGNANT NEOPLASM OF INTRAHEPATIC BILE DUCTS (HCC): ICD-10-CM

## 2020-04-03 PROCEDURE — 700117 HCHG RX CONTRAST REV CODE 255: Performed by: INTERNAL MEDICINE

## 2020-04-03 PROCEDURE — 71260 CT THORAX DX C+: CPT

## 2020-04-03 RX ADMIN — IOHEXOL 25 ML: 240 INJECTION, SOLUTION INTRATHECAL; INTRAVASCULAR; INTRAVENOUS; ORAL at 09:51

## 2020-04-03 RX ADMIN — IOHEXOL 100 ML: 350 INJECTION, SOLUTION INTRAVENOUS at 09:51

## 2020-04-12 ENCOUNTER — APPOINTMENT (OUTPATIENT)
Dept: ONCOLOGY | Facility: MEDICAL CENTER | Age: 70
End: 2020-04-12
Attending: INTERNAL MEDICINE
Payer: COMMERCIAL

## 2020-05-06 ENCOUNTER — HOSPITAL ENCOUNTER (OUTPATIENT)
Dept: RADIOLOGY | Facility: MEDICAL CENTER | Age: 70
End: 2020-05-06
Attending: INTERNAL MEDICINE
Payer: COMMERCIAL

## 2020-05-06 DIAGNOSIS — C22.1 MALIGNANT NEOPLASM OF INTRAHEPATIC BILE DUCTS (HCC): ICD-10-CM

## 2020-05-06 PROCEDURE — A9552 F18 FDG: HCPCS

## 2020-05-07 RX ORDER — 0.9 % SODIUM CHLORIDE 0.9 %
5 VIAL (ML) INJECTION PRN
Status: CANCELLED | OUTPATIENT
Start: 2020-05-31

## 2020-05-07 RX ORDER — 0.9 % SODIUM CHLORIDE 0.9 %
VIAL (ML) INJECTION PRN
Status: CANCELLED | OUTPATIENT
Start: 2020-05-29

## 2020-05-07 RX ORDER — ONDANSETRON 2 MG/ML
4 INJECTION INTRAMUSCULAR; INTRAVENOUS PRN
Status: CANCELLED | OUTPATIENT
Start: 2020-05-31

## 2020-05-07 RX ORDER — EPINEPHRINE 1 MG/ML(1)
0.5 AMPUL (ML) INJECTION PRN
Status: CANCELLED | OUTPATIENT
Start: 2020-05-31

## 2020-05-07 RX ORDER — PROCHLORPERAZINE MALEATE 10 MG
10 TABLET ORAL EVERY 6 HOURS PRN
Status: CANCELLED | OUTPATIENT
Start: 2020-05-31

## 2020-05-07 RX ORDER — 0.9 % SODIUM CHLORIDE 0.9 %
VIAL (ML) INJECTION PRN
Status: CANCELLED | OUTPATIENT
Start: 2020-05-31

## 2020-05-07 RX ORDER — SODIUM CHLORIDE 9 MG/ML
INJECTION, SOLUTION INTRAVENOUS CONTINUOUS
Status: CANCELLED | OUTPATIENT
Start: 2020-05-31

## 2020-05-07 RX ORDER — DIPHENHYDRAMINE HYDROCHLORIDE 50 MG/ML
50 INJECTION INTRAMUSCULAR; INTRAVENOUS PRN
Status: CANCELLED | OUTPATIENT
Start: 2020-05-31

## 2020-05-07 RX ORDER — 0.9 % SODIUM CHLORIDE 0.9 %
5 VIAL (ML) INJECTION PRN
Status: CANCELLED | OUTPATIENT
Start: 2020-05-29

## 2020-05-07 RX ORDER — ONDANSETRON 8 MG/1
8 TABLET, ORALLY DISINTEGRATING ORAL PRN
Status: CANCELLED | OUTPATIENT
Start: 2020-05-31

## 2020-05-07 RX ORDER — METHYLPREDNISOLONE SODIUM SUCCINATE 125 MG/2ML
125 INJECTION, POWDER, LYOPHILIZED, FOR SOLUTION INTRAMUSCULAR; INTRAVENOUS PRN
Status: CANCELLED | OUTPATIENT
Start: 2020-05-31

## 2020-05-08 ENCOUNTER — APPOINTMENT (OUTPATIENT)
Dept: ONCOLOGY | Facility: MEDICAL CENTER | Age: 70
End: 2020-05-08
Attending: INTERNAL MEDICINE
Payer: COMMERCIAL

## 2020-05-08 ENCOUNTER — TELEPHONE (OUTPATIENT)
Dept: ONCOLOGY | Facility: MEDICAL CENTER | Age: 70
End: 2020-05-08

## 2020-05-08 NOTE — TELEPHONE ENCOUNTER
Patient no showed for appointment. Called and spoke with patient who stated she received treatment 5 days ago and her next treatment was not supposed to be until 5/29.      Called  to discuss cancellation and  informed me that the patient cancelled her treatment 5 days ago and the appointment for today and tomorrow were scheduled last night.    Called patient again to discuss the information I received from the  and left VM.

## 2020-05-08 NOTE — TELEPHONE ENCOUNTER
Called patient again with no answer.  MD notified of patient no show and previous communication.

## 2020-05-21 ENCOUNTER — HOSPITAL ENCOUNTER (OUTPATIENT)
Dept: RADIATION ONCOLOGY | Facility: MEDICAL CENTER | Age: 70
End: 2020-05-31
Attending: RADIOLOGY
Payer: COMMERCIAL

## 2020-05-21 VITALS
HEART RATE: 85 BPM | DIASTOLIC BLOOD PRESSURE: 69 MMHG | TEMPERATURE: 97.5 F | BODY MASS INDEX: 32.17 KG/M2 | OXYGEN SATURATION: 97 % | SYSTOLIC BLOOD PRESSURE: 119 MMHG | WEIGHT: 179.3 LBS

## 2020-05-21 DIAGNOSIS — C22.1 CHOLANGIOCARCINOMA (HCC): ICD-10-CM

## 2020-05-21 PROCEDURE — 99214 OFFICE O/P EST MOD 30 MIN: CPT | Performed by: RADIOLOGY

## 2020-05-21 PROCEDURE — 99205 OFFICE O/P NEW HI 60 MIN: CPT | Performed by: RADIOLOGY

## 2020-05-21 RX ORDER — TAPENTADOL HYDROCHLORIDE 50 MG/1
TABLET, FILM COATED ORAL
COMMUNITY
Start: 2020-04-22 | End: 2022-09-18

## 2020-05-21 RX ORDER — GENTAMICIN SULFATE 3 MG/ML
SOLUTION/ DROPS OPHTHALMIC
COMMUNITY
Start: 2020-03-30 | End: 2022-09-18

## 2020-05-21 ASSESSMENT — PAIN SCALES - GENERAL: PAINLEVEL: 4=SLIGHT-MODERATE PAIN

## 2020-05-21 ASSESSMENT — FIBROSIS 4 INDEX: FIB4 SCORE: 2.42

## 2020-05-21 NOTE — CONSULTS
RADIATION ONCOLOGY CONSULT    DATE OF SERVICE: 5/21/2020    IDENTIFICATION: A 69 y.o. female with perihilar cholangiocarcinoma s/p right hepatic lobectomy, resection of caudate lobe with hilar and celiac node dissection, and hepaticojejunostomy  6/7/2011 with [id number] with peritoneal recurrence s/p left sidewall recurrence s/p omentectomy and right salpingoophorectomy 6/8/2011 with metastatic papillary adenocarcinoma now with bulky oligopersistent metastasis in area of pancreatic head/duodenum     Visit Diagnoses     ICD-10-CM   1. Cholangiocarcinoma (HCC)  C22.1     Cholangiocarcinoma (HCC)  Staging form: Perihilar Bile Ducts, AJCC 8th Edition  - Clinical stage from 5/21/2020: Stage IVB (cT1, cN0, cM1) - Signed by Clarence Juan M.D. on 5/21/2020  Histologic grade (G): G1  Histologic grading system: 3 grade system  Lymph-vascular invasion (LVI): LVI not present (absent)/not identified    She is here at the kind request of Dr. Pinto    HISTORY OF PRESENT ILLNESS:   Patient originally presented in 2010 and underwent right hepatic lobectomy, resection of caudate lobe with hilar and celiac node dissection, and hepaticojejunostomy with  6/7/2011 with [id number] with peritoneal recurrence s/p left sidewall recurrence s/p omentectomy and right salpingoophorectomy by Dr. Nance 6/8/2011 with metastatic papillary adenocarcinoma now with bulky metastasis in area of pancreatic head. Patient was seen by Dr. Hull to consider resection of enlarging mass in the head of pancreas but given infiltrative nature was not thought to be a good candidate. Patient was started on keytruda 10/2019 with Dr. Pinto and is doing well but has oligo progression in duodenal/pancreas mass.      PROBLEM LIST:  Patient Active Problem List   Diagnosis   • Left arm weakness   • Hypothyroidism   • Cholangiocarcinoma (HCC)        PAST SURGICAL HISTORY:  Past Surgical History:   Procedure Laterality Date   • PELVISCOPY  4/29/2011     Performed by KANDIS AARON at SURGERY [facility] ORS   • OMENTECTOMY  2011    Performed by KANDIS AARON at SURGERY [facility] ORS   • BIOPSY GENERAL  2011    Performed by KANDIS AARON at SURGERY [facility] ORS   • OOPHORECTOMY  2011    Performed by KANDIS AARON at SURGERY [facility] ORS   • ERCP IN OR  2010    Performed by JONATAN CURRY at SURGERY [facility] ORS   • ERCP W/ INSERTION STENT/TUBE  3/29/2010    Performed by BUNNY BAHENA at SURGERY [facility]   • ERCP W/SPHINCTEROTOMY/PAPILL.  3/29/2010    Performed by BUNNY BAHENA at SURGERY [facility]   • PB RESEC LIVER,PART LOBECTOMY     • GYN SURGERY      partial hysterectomy   • OTHER      1970 tonsilectomy    • OTHER ORTHOPEDIC SURGERY       spinal fusion/minucus disk   • PB  DELIVERY ONLY      c section x 4   • PRIMARY C SECTION      4 C Sections       CURRENT MEDICATIONS:  Current Outpatient Medications   Medication Sig Dispense Refill   • NUCYNTA 50 MG Tab TAKE 1 TO 2 TABLETS BY MOUTH EVERY 8 HOURS AS NEEDED FOR PAIN FOR 30 DAYS     • gentamicin (GARAMYCIN) 0.3 % Solution INSTILL 1 DROP INTO AFFECTED EYE EVERY 4 HOURS     • escitalopram (LEXAPRO) 5 MG tablet      • SUMAtriptan (IMITREX) 25 MG Tab tablet sumatriptan 25 mg tablet     • tramadol (ULTRAM) 50 MG Tab tramadol 50 mg tablet   TAKE 1 TABLET BY MOUTH TWICE A DAY AS NEEDED     • fluticasone (FLONASE) 50 MCG/ACT nasal spray Spray 2 Sprays in nose every day. 16 g 0   • albuterol 108 (90 Base) MCG/ACT Aero Soln inhalation aerosol Inhale 2 Puffs by mouth every 6 hours as needed for Shortness of Breath.     • SYNTHROID 75 MCG TABS Take 75 mcg by mouth every day.       No current facility-administered medications for this encounter.        ALLERGIES:    Gadolinium; Iodine; Oxycodone; and Tape    FAMILY HISTORY:    No hx of cancer    SOCIAL HISTORY:     reports that she has never smoked. She has never used smokeless tobacco. She reports current  alcohol use. She reports that she does not use drugs.    REVIEW OF SYSTEMS:  A review of systems for today's date of service was reviewed and uploaded into the electronic medical record.      PHYSICAL EXAM:    ECOG PERFORMANCE STATUS:  No flowsheet data found.  Karnofsky Score 5/21/2020   Karnofsky Score 90   Some recent data might be hidden     /69   Pulse 85   Temp 36.4 °C (97.5 °F)   Wt 81.3 kg (179 lb 4.8 oz)   SpO2 97%   BMI 32.17 kg/m²   Physical Exam  Vitals signs reviewed.   Constitutional:       Appearance: Normal appearance.   HENT:      Head: Normocephalic and atraumatic.      Mouth/Throat:      Mouth: Mucous membranes are moist.   Eyes:      Pupils: Pupils are equal, round, and reactive to light.   Neck:      Musculoskeletal: Normal range of motion.   Cardiovascular:      Rate and Rhythm: Normal rate.   Pulmonary:      Effort: Pulmonary effort is normal.   Musculoskeletal: Normal range of motion.   Neurological:      General: No focal deficit present.      Mental Status: She is alert.   Psychiatric:         Mood and Affect: Mood normal.          LABORATORY DATA:   Lab Results   Component Value Date/Time    WBC 3.6 (L) 03/22/2020 0840    WBC 3.5 (L) 03/01/2020 0843    WBC 3.7 (L) 02/09/2020 0857    HEMOGLOBIN 11.0 (L) 03/22/2020 0840    HEMOGLOBIN 10.5 (L) 03/01/2020 0843    HEMOGLOBIN 9.7 (L) 02/09/2020 0857    HEMATOCRIT 38.2 03/22/2020 0840    HEMATOCRIT 34.8 (L) 03/01/2020 0843    HEMATOCRIT 34.9 (L) 02/09/2020 0857    MCV 76.4 (L) 03/22/2020 0840    MCV 74.8 (L) 03/01/2020 0843    MCV 75.9 (L) 02/09/2020 0857    PLATELETCT 196 03/22/2020 0840    PLATELETCT 189 03/01/2020 0843    PLATELETCT 188 02/09/2020 0857    NEUTS 1.86 (L) 03/22/2020 0840    NEUTS 1.73 (L) 03/01/2020 0843    NEUTS 1.62 (L) 02/09/2020 0857      Lab Results   Component Value Date/Time    SODIUM 141 03/22/2020 0840    SODIUM 138 03/01/2020 0843    SODIUM 140 02/09/2020 0857    POTASSIUM 4.3 03/22/2020 0840    POTASSIUM  4.0 03/01/2020 0843    POTASSIUM 4.8 02/09/2020 0857    BUN 9 03/22/2020 0840    BUN 17 03/01/2020 0843    BUN 18 02/09/2020 0857    CREATININE 0.60 03/22/2020 0840    CREATININE 0.59 03/01/2020 0843    CREATININE 0.68 02/09/2020 0857    CALCIUM 9.0 03/22/2020 0840    CALCIUM 9.2 03/01/2020 0843    CALCIUM 8.7 02/09/2020 0857    ALBUMIN 3.8 03/22/2020 0840    ALBUMIN 3.7 03/01/2020 0843    ALBUMIN 3.4 02/09/2020 0857    ASTSGOT 15 03/22/2020 0840    ASTSGOT 16 03/01/2020 0843    ASTSGOT 21 02/09/2020 0857    ALKPHOSPHAT 71 03/22/2020 0840    ALKPHOSPHAT 57 03/01/2020 0843    ALKPHOSPHAT 54 02/09/2020 0857    IFNOTAFR >60 03/22/2020 0840    IFNOTAFR >60 03/01/2020 0843    IFNOTAFR >60 02/09/2020 0857    LDHTOTAL 209 09/26/2016 1300    LDHTOTAL 215 12/13/2013 0731    LDHTOTAL 251 10/04/2013 1203       RADIOLOGY DATA:  Un-pvzsm-dymdu Base To Mid-thigh    Result Date: 5/6/2020 5/6/2020 11:15 AM HISTORY/REASON FOR EXAM:  Malignant neoplasm of intrahepatic bile ducts.  Follow-up cholangiocarcinoma. TECHNIQUE/EXAM DESCRIPTION AND NUMBER OF VIEWS: PET body imaging. Initially, 16.83 mCi F-18 FDG was administered intravenously under standardized conditions. Approximately 45 minutes after FDG administration, the patient was placed in the supine position on the PET CT table. Blood glucose level was 90 mg/dL. Low dose spiral CT imaging was performed from the skull base to the mid thighs. PET imaging was then performed from the skull base to the mid thighs. CT images, PET images, and PET/CT fused images were reviewed on a PACS 3D workstation. The limited non-contrast CT data are used primarily for attenuation correction and anatomic correlation.  Evaluation of solid organs and bowel are especially limited utilizing this technique. COMPARISON: CT chest abdomen/pelvis 4/3/2020, PET/CT 5/17/2019 FINDINGS: Probable salivary activity in the oropharynx. Vague mildly increased uptake in the RIGHT upper lung, likely corresponding to  ill-defined nodule, max SUV 1.3. No abnormal activity in the mediastinal or LEFT lung. Prominent LEFT lobe liver, without focal abnormal activity.  Intense uptake corresponds to ovoid mass in the RIGHT upper abdomen interposed between the duodenum and inferior vena cava, max SUV 55.7, previously 76.6. No focal abnormal uptake in the spleen. Expected urinary activity with apparent prominent collecting systems bilaterally. Physiologic bowel uptake. Low-dose CT images show RIGHT-sided aortic arch.  Ill-defined RIGHT upper lobe nodule appears unchanged.  Postoperative change from prior RIGHT hepatic lobectomy.  Soft tissue density previously described between the duodenum and IVC is grossly stable.  Colonic diverticula.     1.  Minimal uptake corresponding to ill-defined RIGHT upper lobe nodule favoring benign etiology. 2.  Markedly hypermetabolic mass again present interposed between duodenum and inferior vena cava with slight reduction in SUV since prior exam.  Size appears overall unchanged. 3.  Prior RIGHT hepatic lobectomy.      IMPRESSION:    69 y.o. female with perihilar cholangiocarcinoma s/p right hepatic lobectomy, resection of caudate lobe with hilar and celiac node dissection, and hepaticojejunostomy  6/7/2011 with [id number] with peritoneal recurrence s/p left sidewall recurrence s/p omentectomy and right salpingoophorectomy 6/8/2011 with metastatic papillary adenocarcinoma now with bulky oligopersistent metastasis in area of pancreatic head/duodenum     Visit Diagnoses     ICD-10-CM   1. Cholangiocarcinoma (HCC)  C22.1     Cholangiocarcinoma (HCC)  Staging form: Perihilar Bile Ducts, AJCC 8th Edition  - Clinical stage from 5/21/2020: Stage IVB (cT1, cN0, cM1) - Signed by Clarence Juan M.D. on 5/21/2020  Histologic grade (G): G1  Histologic grading system: 3 grade system  Lymph-vascular invasion (LVI): LVI not present (absent)/not identified      RECOMMENDATIONS:   I discussed with her the  treatment for oligo persistent metastasis is controversial and the mainstay is definitely systemic in nature however we are integrating more local therapies including both hypofractionated radiation therapy as well as surgery in this setting.  She was previously seen by Dr. Hull who did not think she was a good surgical candidate and was seen by Dr. Green at [facility] who will readdress this as a possibility versus whole exon/transcriptome sequencing.  Also discussed the idea of using stereotactic body radiation therapy versus hypofractionated radiation therapy to help shrink this mass as well as potentiate the immune response while she is on Keytruda.  In proximity to the duodenum I would recommend a 3-week course as opposed to a single week to minimize risk of small bowel obstruction/ulceration or perforation of the small intestine although I did explain this is a long-term risk of this.  I will discuss this is an option with her medical oncologist Dr. Pinto and Dr. Green.     Thank you for the opportunity to participate in her care.  If any questions or comments, please do not hesitate in calling.    Orders Placed This Encounter   • REFERRAL TO GENETICS   • NUCYNTA 50 MG Tab   • gentamicin (GARAMYCIN) 0.3 % Solution       CC: Dr. Pinto, Dr. Green, Dr. Hull, Dr. Ritter

## 2020-05-21 NOTE — NON-PROVIDER
Patient was seen today in clinic with Dr. Juan for follow up.  Vitals signs and weight were obtained and pain assessment was completed.  Allergies and medications were reviewed with the patient.  Review of systems completed.     Vitals/Pain:  Vitals:    05/21/20 0900   BP: 119/69   Pulse: 85   Temp: 36.4 °C (97.5 °F)   SpO2: 97%   Weight: 81.3 kg (179 lb 4.8 oz)   Pain Score: 4=Slight-Moderate Pain        Allergies:   Gadolinium; Iodine; Oxycodone; and Tape    Current Medications:  Current Outpatient Medications   Medication Sig Dispense Refill   • NUCYNTA 50 MG Tab TAKE 1 TO 2 TABLETS BY MOUTH EVERY 8 HOURS AS NEEDED FOR PAIN FOR 30 DAYS     • gentamicin (GARAMYCIN) 0.3 % Solution INSTILL 1 DROP INTO AFFECTED EYE EVERY 4 HOURS     • escitalopram (LEXAPRO) 5 MG tablet      • SUMAtriptan (IMITREX) 25 MG Tab tablet sumatriptan 25 mg tablet     • tramadol (ULTRAM) 50 MG Tab tramadol 50 mg tablet   TAKE 1 TABLET BY MOUTH TWICE A DAY AS NEEDED     • fluticasone (FLONASE) 50 MCG/ACT nasal spray Spray 2 Sprays in nose every day. 16 g 0   • albuterol 108 (90 Base) MCG/ACT Aero Soln inhalation aerosol Inhale 2 Puffs by mouth every 6 hours as needed for Shortness of Breath.     • SYNTHROID 75 MCG TABS Take 75 mcg by mouth every day.       No current facility-administered medications for this encounter.          PCP:  Eugene Bergeron, Med Ass't

## 2020-05-26 ENCOUNTER — PATIENT OUTREACH (OUTPATIENT)
Dept: OTHER | Facility: MEDICAL CENTER | Age: 70
End: 2020-05-26

## 2020-05-26 NOTE — PROGRESS NOTES
On 2020 at 1242 this ONN called patient. Introduced self and explained role of navigator as added resource and support while in treatment. This ONN explained this ONN was also following up on distress of 10 to see what resources this ONN could assist patient with finding. Patient states her distress is high due to being in treatment and also COVID-19. Patient states she worked at [employer] and has been furloughed. Patient states she is worried about keeping her insurance. This ONN asked if patient had spoken with a financial resource advocate. Patient states she does not remember. This ONN agreed to refer patient back to financial resource advocate. Patient states medically she is handling things well with some intermittent nausea. This ONN asked if patient had discussed nausea with her medical oncologist. Patient states she has not. This ONN asked if patient had spoken with a dietitian. Patient states she does not think so but would like to here from a dietitian. Patient also states that  has been a difficult year because her son in law  unexpectedly after having knee surgery. Patient states she is not interested in counseling at this time but believes it may be helpful in the future. This ONN agreed to refer patient to  to assist patient with counseling resources. This ONN agreed to send patient follow-up email with this ONN's contact information. Patient denied other questions or concerns and thanked ONN for calling.     This ONN then emailed patient contact information.   Referrals placed to social work, financial resource advocate, and dietitian.

## 2020-05-27 ENCOUNTER — PATIENT OUTREACH (OUTPATIENT)
Dept: OTHER | Facility: MEDICAL CENTER | Age: 70
End: 2020-05-27

## 2020-05-27 NOTE — PROGRESS NOTES
On May 27, 2020, Oncology Social Worker Edel Mills attempted telephone contact with pt.  OSW Gene left voicemail message for pt. requesting pt. call back at earliest convenience.  OSSIMONA Mills informed pt. in message about counseling getting set up and asked for pt. to call back to obtain those details.  OSSIMONA Mills left contact information in voicemail message.

## 2020-05-28 ENCOUNTER — TELEPHONE (OUTPATIENT)
Dept: ONCOLOGY | Facility: MEDICAL CENTER | Age: 70
End: 2020-05-28

## 2020-05-28 NOTE — PROGRESS NOTES
On May 27, 2020, Oncology Social Worker Edel Mills received telephone call from pt.  CHARBEL Mills provided pt. with details regarding her upcoming appointment with [facility] for Monday June 1st, 2020 at 11 am.  CHARBEL Mills explained this appointment would be via tele therapy.  Pt. stated she could not do video as the computer is in in an open area in the house.  Pt. shared she is currently living with her daughter.  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  Pt. stated [facility] and [facility] to be two places where she could not go to counseling.

## 2020-05-29 ENCOUNTER — TELEPHONE (OUTPATIENT)
Dept: NUTRITION | Facility: MEDICAL CENTER | Age: 70
End: 2020-05-29

## 2020-05-29 ENCOUNTER — OUTPATIENT INFUSION SERVICES (OUTPATIENT)
Dept: ONCOLOGY | Facility: MEDICAL CENTER | Age: 70
End: 2020-05-29
Attending: INTERNAL MEDICINE
Payer: COMMERCIAL

## 2020-05-29 VITALS
HEART RATE: 79 BPM | SYSTOLIC BLOOD PRESSURE: 114 MMHG | BODY MASS INDEX: 32.46 KG/M2 | OXYGEN SATURATION: 98 % | HEIGHT: 62 IN | TEMPERATURE: 97.6 F | WEIGHT: 176.37 LBS | DIASTOLIC BLOOD PRESSURE: 60 MMHG | RESPIRATION RATE: 18 BRPM

## 2020-05-29 DIAGNOSIS — C22.1 CHOLANGIOCARCINOMA (HCC): ICD-10-CM

## 2020-05-29 LAB
ALBUMIN SERPL BCP-MCNC: 3.9 G/DL (ref 3.2–4.9)
ALBUMIN/GLOB SERPL: 1.6 G/DL
ALP SERPL-CCNC: 72 U/L (ref 30–99)
ALT SERPL-CCNC: 8 U/L (ref 2–50)
ANION GAP SERPL CALC-SCNC: 12 MMOL/L (ref 7–16)
ANISOCYTOSIS BLD QL SMEAR: ABNORMAL
AST SERPL-CCNC: 18 U/L (ref 12–45)
BASOPHILS # BLD AUTO: 0.8 % (ref 0–1.8)
BASOPHILS # BLD: 0.03 K/UL (ref 0–0.12)
BILIRUB SERPL-MCNC: 0.5 MG/DL (ref 0.1–1.5)
BUN SERPL-MCNC: 10 MG/DL (ref 8–22)
CALCIUM SERPL-MCNC: 9.6 MG/DL (ref 8.5–10.5)
CHLORIDE SERPL-SCNC: 106 MMOL/L (ref 96–112)
CO2 SERPL-SCNC: 22 MMOL/L (ref 20–33)
COMMENT 1642: NORMAL
CREAT SERPL-MCNC: 0.59 MG/DL (ref 0.5–1.4)
EOSINOPHIL # BLD AUTO: 0 K/UL (ref 0–0.51)
EOSINOPHIL NFR BLD: 0 % (ref 0–6.9)
ERYTHROCYTE [DISTWIDTH] IN BLOOD BY AUTOMATED COUNT: 50.1 FL (ref 35.9–50)
GLOBULIN SER CALC-MCNC: 2.4 G/DL (ref 1.9–3.5)
GLUCOSE SERPL-MCNC: 98 MG/DL (ref 65–99)
HCT VFR BLD AUTO: 40.8 % (ref 37–47)
HGB BLD-MCNC: 11.8 G/DL (ref 12–16)
IMM GRANULOCYTES # BLD AUTO: 0.01 K/UL (ref 0–0.11)
IMM GRANULOCYTES NFR BLD AUTO: 0.3 % (ref 0–0.9)
LYMPHOCYTES # BLD AUTO: 1.34 K/UL (ref 1–4.8)
LYMPHOCYTES NFR BLD: 36 % (ref 22–41)
MCH RBC QN AUTO: 23.2 PG (ref 27–33)
MCHC RBC AUTO-ENTMCNC: 28.9 G/DL (ref 33.6–35)
MCV RBC AUTO: 80.2 FL (ref 81.4–97.8)
MICROCYTES BLD QL SMEAR: ABNORMAL
MONOCYTES # BLD AUTO: 0.32 K/UL (ref 0–0.85)
MONOCYTES NFR BLD AUTO: 8.6 % (ref 0–13.4)
MORPHOLOGY BLD-IMP: NORMAL
NEUTROPHILS # BLD AUTO: 2.02 K/UL (ref 2–7.15)
NEUTROPHILS NFR BLD: 54.3 % (ref 44–72)
NRBC # BLD AUTO: 0 K/UL
NRBC BLD-RTO: 0 /100 WBC
OVALOCYTES BLD QL SMEAR: NORMAL
PLATELET # BLD AUTO: 209 K/UL (ref 164–446)
PLATELET BLD QL SMEAR: NORMAL
PMV BLD AUTO: 10.3 FL (ref 9–12.9)
POIKILOCYTOSIS BLD QL SMEAR: NORMAL
POTASSIUM SERPL-SCNC: 4 MMOL/L (ref 3.6–5.5)
PROT SERPL-MCNC: 6.3 G/DL (ref 6–8.2)
RBC # BLD AUTO: 5.09 M/UL (ref 4.2–5.4)
RBC BLD AUTO: PRESENT
SODIUM SERPL-SCNC: 140 MMOL/L (ref 135–145)
T4 FREE SERPL-MCNC: 1.18 NG/DL (ref 0.93–1.7)
TSH SERPL DL<=0.005 MIU/L-ACNC: 0.57 UIU/ML (ref 0.38–5.33)
VARIANT LYMPHS BLD QL SMEAR: NORMAL
WBC # BLD AUTO: 3.7 K/UL (ref 4.8–10.8)

## 2020-05-29 PROCEDURE — 36415 COLL VENOUS BLD VENIPUNCTURE: CPT

## 2020-05-29 PROCEDURE — 82024 ASSAY OF ACTH: CPT

## 2020-05-29 PROCEDURE — 80053 COMPREHEN METABOLIC PANEL: CPT

## 2020-05-29 PROCEDURE — 84439 ASSAY OF FREE THYROXINE: CPT

## 2020-05-29 PROCEDURE — 85025 COMPLETE CBC W/AUTO DIFF WBC: CPT

## 2020-05-29 PROCEDURE — 84443 ASSAY THYROID STIM HORMONE: CPT

## 2020-05-29 ASSESSMENT — FIBROSIS 4 INDEX: FIB4 SCORE: 2.42

## 2020-05-29 NOTE — PROGRESS NOTES
"Pt arrived to [facility], ambulatory, for pre-chemo labs. Pt states she had a recent appointment with Dr. Juan and \"I'm not doing well.\" Pt states her surgeon at [facility] would like to do another surgery and she has not yet decided if she will proceed with that. Labs drawn with 23g butterfly needle in the L-wrist. Pt left [facility] with no s/sx of distress. Follow up appointment confirmed for Sunday.  "

## 2020-05-29 NOTE — TELEPHONE ENCOUNTER
Nutrition Services: Telephone   Referral received for patient with questions for RD.  I am familiar with patient from previous encounter during infusion appointment.  Of note, weight has been fairly stable, most recently she weighed 80 kg/176 pounds.    I called patient this morning and she reports that she is wanting more information about what she should eat and do to stay healthy during treatment.  She has recently moved in with her daughter who follows a vegan diet and is also restricting patients diet and making her follow a vegan diet.  Gigi reports she feels restricted with her diet related to her daughter and notes that she is eating less than she was previously and feels very low on energy.  She reports eating 1-2 meals per day and that he daughter is not receptive to discuss animal products or patients nutrition requests. I encouraged patient to speak with her daughter regarding her wishes.  I also discussed the important role that nutrition plays during cancer treatment, healing and cancer prevention.  Patient requested written materials detailing suggestions as well as foods that would be recommended.  I will send this via email and also in the standard mail per patients request.     Plan/Recommends Sent to Patient:   o Be sure you are eating frequently throughout the day for amble energy and to promote adequate nutrition.  I tend to recommend more of a grazing approach for my patients undergoing treatment and eating/snacking every 2-3 hours.  You will see suggestions in the following handouts regarding snack ideas and recipes.    o Protein foods are very importance during this time as you repair and replace healthy cells and tissues.  There is no proven reason why you need to avoid all animal products at this time but you may continue to do so if you wish as long as you are focusing on getting adequate protein in other ways. It obviously can be much easier to get protein foods in your diet if you  utilize animal products such as meats, dairy or eggs but if you decide to keep these out of your diet please be mindful that you are getting quality protein with all of your meals.  Quality vegan protein would be beans, lentils, nuts, soy, tofu, tempeh etc.  In order for your body to receive all necessary amino acids (proteins) it is important to eat grain products as well as legumes together.  These two foods create a “Complete Protein” and provide all essential proteins.  Think: beans and rice, peanut butter on bread, lentils with bread or rice etc. Additionally if you do follow a strict vegan diet I would highly recommend that you speak with your doctor about supplementing with B12 as you will not be receiving adequate B12 in your diet which can affect your energy level.   These can be obtained over the counter if your doctor is okay with you adding them.       o Another option would be to include a protein supplement.  Given that your weight has appeared fairly stable I think that incorporating one of these 1x/day would be adequate.  I have attached some recipes for you regarding this.  Some of my favorite protein powder supplements are: Orgain, Germain’s Whey (not vegan), and Viera.     o Aim to incorporate fruits and vegetables throughout the day at all meals as well as plenty of whole grain items.      RD IS AVAILABLE PRN

## 2020-05-30 LAB — ACTH PLAS-MCNC: 10.4 PG/ML (ref 7.2–63.3)

## 2020-05-31 ENCOUNTER — OUTPATIENT INFUSION SERVICES (OUTPATIENT)
Dept: ONCOLOGY | Facility: MEDICAL CENTER | Age: 70
End: 2020-05-31
Attending: INTERNAL MEDICINE
Payer: COMMERCIAL

## 2020-05-31 VITALS
SYSTOLIC BLOOD PRESSURE: 110 MMHG | BODY MASS INDEX: 31.6 KG/M2 | OXYGEN SATURATION: 97 % | WEIGHT: 178.35 LBS | HEART RATE: 81 BPM | TEMPERATURE: 97.8 F | HEIGHT: 63 IN | RESPIRATION RATE: 18 BRPM | DIASTOLIC BLOOD PRESSURE: 58 MMHG

## 2020-05-31 DIAGNOSIS — C22.1 CHOLANGIOCARCINOMA (HCC): ICD-10-CM

## 2020-05-31 PROCEDURE — 700111 HCHG RX REV CODE 636 W/ 250 OVERRIDE (IP): Performed by: INTERNAL MEDICINE

## 2020-05-31 PROCEDURE — 700105 HCHG RX REV CODE 258: Performed by: INTERNAL MEDICINE

## 2020-05-31 PROCEDURE — 96413 CHEMO IV INFUSION 1 HR: CPT

## 2020-05-31 PROCEDURE — 96415 CHEMO IV INFUSION ADDL HR: CPT

## 2020-05-31 RX ORDER — SODIUM CHLORIDE 9 MG/ML
INJECTION, SOLUTION INTRAVENOUS CONTINUOUS
Status: DISCONTINUED | OUTPATIENT
Start: 2020-05-31 | End: 2020-05-31 | Stop reason: HOSPADM

## 2020-05-31 RX ADMIN — SODIUM CHLORIDE: 9 INJECTION, SOLUTION INTRAVENOUS at 09:50

## 2020-05-31 RX ADMIN — SODIUM CHLORIDE 200 MG: 9 INJECTION, SOLUTION INTRAVENOUS at 09:50

## 2020-05-31 ASSESSMENT — FIBROSIS 4 INDEX: FIB4 SCORE: 2.04

## 2020-05-31 NOTE — PROGRESS NOTES
"Pharmacy Chemotherapy Verification    Patient name: Gigi Pagan  Dx: cholangiocarcinoma         Protocol: Keytruda   Pembrolizumab 200 mg IV over 30 min on Day 1  21-day cycle until disease progression or unacceptable toxicity  NCCN Guidelines for Hepatobiliary Cancers V.1.2019  Disha REID et al. J Clin Oncol. 2017:35(15_suppl):cwdcb1896  Yg ALVAERZ, et al. Eur J Cancer. 2015;51(3_suppl):S112    Allergies:  Gadolinium and Tape     /58   Pulse 81   Temp 36.6 °C (97.8 °F) (Temporal)   Resp 18   Ht 1.59 m (5' 2.6\")   Wt 80.9 kg (178 lb 5.6 oz)   SpO2 97%   BMI 32.00 kg/m²  Body surface area is 1.89 meters squared.    All lab results 5/29/20 within treatment parameters.     Drug Order   (Drug name, dose, route, IV Fluid & volume, frequency, number of doses) Cycle 10  Previous treatment: 3/22/20     Medication = Pembrolizumab (Keytruda)  Base Dose = 200 mg   Calc Dose: Fixed dose, no calculation required  Final Dose = 200 mg  Route = IV  Fluid & Volume = NS 50 mL  Admin Duration = Over 30 minutes          <10% difference, okay to treat with final dose     By my signature below, I confirm this process was performed independently with the BSA and all final chemotherapy dosing calculations congruent. I have reviewed the above chemotherapy order and that my calculation of the final dose and BSA (when applicable) corroborate those calculations of the  pharmacist. Discrepancies of 10% or greater in the written dose have been addressed and documented within the [facility] Progress notes.    PILAR Lea Pharm.D.              "

## 2020-05-31 NOTE — PROGRESS NOTES
"Pharmacy Chemotherapy Calculation:    Patient Name: Gigi Pagan  Dx: Cholangiocarcinoma    Cycle 10  Previous treatment: 3/22/20    Protocol: Keytruda  Pembrolizumab (Keytruda) 200 mg IV on Day 1  21-day cycle until disease progression or unacceptable toxicity  NCCN Guidelines for Hepatobiliary Cancers. V.1.2019.  Disha REID et al. JCO. 2017;35(15_suppl):abstr 2512.  Yg YJ, et al. Eur J Cancer. 2015;51(3_suppl):S112.         Allergies:Gadolinium and Tape    /58   Pulse 81   Temp 36.6 °C (97.8 °F) (Temporal)   Resp 18   Ht 1.59 m (5' 2.6\")   Wt 80.9 kg (178 lb 5.6 oz)   SpO2 97%   BMI 32.00 kg/m²  Body surface area is 1.89 meters squared.     Labs: 5/29/20   Meets treatment parameters.     Pembrolizumab 200 mg fixed dose   No calculation required, ok for final dose = 200 mg IV    Dallin Velasco, PharmD    "

## 2020-05-31 NOTE — PROGRESS NOTES
"Non face to face prolonged services of clinical data and chart information reviewed for a total time of 30 performed on 5/30 and 31 for Nat Pagan  Reviewed were:    Referral    Previous [facility]    Imaging all imaging including mammography, colonoscopy, CT/tomography, MRI/PET    Previous procedures all surgical and biopsy procedures including pathology analysis and interpretation    Notes  C22.1 (ICD-10-CM) - Cholangiocarcinoma (HCC)     Media  All personal and family clinical data from outside institutions including germline DNA studies and implications    Miscellaneous reports    Patient summaries family history as documented by referring clinician  Counseling, testing information and materials prepared  \"I spent 30 minutes  from 1230 to 1300 reviewing past records, prior to visit pertaining to genetic risk.\"     Skyler Velázquez M.D  edited  "

## 2020-05-31 NOTE — PROGRESS NOTES
"Patient arrived ambulatory to the [facility] for Keytruda. Reviewed vital signs, labs, and physician order. Patient denies S&S of infection, or bleeding. Reviewed thyroid levels with pt, states \"I IV access established in left forearm by Anais HUBBARD, visualized brisk blood return. Keytruda administered with a filter, no adverse reaction observed. IV flushed per protocol, catheter removed with tip intact, gauze and coban dressing placed. Confirmed upcoming appointment date and time with patient. Patient left the [facility] ambulatory in no sign of distress.   "

## 2020-05-31 NOTE — PROGRESS NOTES
Chemotherapy Verification - PRIMARY RN      Height = 62.6in  Weight = 80.9kg  BSA = 1.88m2       Medication: Keytruda  Dose: 200mg  Calculated Dose: 200mg                             (In mg/m2, AUC, mg/kg)       I confirm this process was performed independently with the BSA and all final chemotherapy dosing calculations congruent.  Any discrepancies of 10% or greater have been addressed with the chemotherapy pharmacist. The resolution of the discrepancy has been documented in the EPIC progress notes.

## 2020-05-31 NOTE — PROGRESS NOTES
Chemotherapy Verification - SECONDARY RN       Height = 159 cm  Weight = 80.9 kg  BSA = 1.89 m2       Medication: Keytruda  Dose: 200 mg (set dose)  Calculated Dose: 200 mg                             (In mg/m2, AUC, mg/kg)       I confirm that this process was performed independently.

## 2020-06-02 ENCOUNTER — OFFICE VISIT (OUTPATIENT)
Dept: HEMATOLOGY ONCOLOGY | Facility: MEDICAL CENTER | Age: 70
End: 2020-06-02
Payer: COMMERCIAL

## 2020-06-02 VITALS
RESPIRATION RATE: 18 BRPM | SYSTOLIC BLOOD PRESSURE: 128 MMHG | WEIGHT: 176.48 LBS | HEIGHT: 63 IN | DIASTOLIC BLOOD PRESSURE: 82 MMHG | HEART RATE: 76 BPM | TEMPERATURE: 98.7 F | OXYGEN SATURATION: 94 % | BODY MASS INDEX: 31.27 KG/M2

## 2020-06-02 DIAGNOSIS — C22.1 CHOLANGIOCARCINOMA (HCC): ICD-10-CM

## 2020-06-02 PROCEDURE — 99358 PROLONG SERVICE W/O CONTACT: CPT | Performed by: MEDICAL GENETICS

## 2020-06-02 PROCEDURE — 99203 OFFICE O/P NEW LOW 30 MIN: CPT | Performed by: MEDICAL GENETICS

## 2020-06-02 ASSESSMENT — FIBROSIS 4 INDEX: FIB4 SCORE: 2.04

## 2020-06-02 ASSESSMENT — PATIENT HEALTH QUESTIONNAIRE - PHQ9: CLINICAL INTERPRETATION OF PHQ2 SCORE: 0

## 2020-06-02 NOTE — NON-PROVIDER
Kit was sent home with patient to complete and send off to [facility] via [employer] due to COVID19 and Provider's preference.     Instructed patient on how to collect successful saliva specimen, all necessary documents were sent with the kit. Patient agreed and verbalized understanding.

## 2020-06-02 NOTE — PROGRESS NOTES
GENETIC RISK ASSESSMENT    Gigi Pagan is a 69 y.o. year old patient who was referred by [facility] for cancer genetic risk assessment.    Chief Complaint: cholangiocarcinoma    HPI: The patient was well until 10 years ago when a diagnosis of cholagiocarcinoma was made. The patient was treated surgically  The patient's family history is limited  Review of personal and family histories suggested that a cancer genetic risk assessment was in order.    Review of Systems (ROS):  Constitutional N  Eyes N  ENT N   Respiratory N  Cardiovascular N  Gastrointestinal N  Genitourinary N  Musculoskeletal N  Skin N  Neurological N  Endocrine N  Psychiatric N  Hematologic/lymphatic N  Allergic/Immunologic GADOLINIUM  All other systems reviewed and are negative.    History (Past/Family)  Family History:   No family history on file.   3 generation pedigree built   Yes   Esther-Yovany empiric risk calculation No   Desmond II empiric risk calculation  No    Social History:       Social History     Socioeconomic History   • Marital status:      Spouse name: Not on file   • Number of children: Not on file   • Years of education: Not on file   • Highest education level: Not on file   Occupational History   • Occupation: pyschotherapist   Social Needs   • Financial resource strain: Not on file   • Food insecurity     Worry: Not on file     Inability: Not on file   • Transportation needs     Medical: Not on file     Non-medical: Not on file   Tobacco Use   • Smoking status: Never Smoker   • Smokeless tobacco: Never Used   Substance and Sexual Activity   • Alcohol use: Yes     Comment: Occasional   • Drug use: No   • Sexual activity: Not on file   Lifestyle   • Physical activity     Days per week: Not on file     Minutes per session: Not on file   • Stress: Not on file   Relationships   • Social connections     Talks on phone: Not on file     Gets together: Not on file     Attends Islam service: Not on file     Active member  "of club or organization: Not on file     Attends meetings of clubs or organizations: Not on file     Relationship status: Not on file   • Intimate partner violence     Fear of current or ex partner: Not on file     Emotionally abused: Not on file     Physically abused: Not on file     Forced sexual activity: Not on file   Other Topics Concern   • Not on file   Social History Narrative    Lives in [place], [place] with daughter.       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        No PE done for covid 19 precaution protocol     Assessment and Plan:  Patient with diagnosis of choangiocarcinoma made at age 60    I spent a total of 30 minutes of face to face time with >50% of time spent in counseling and coordination of care discussing matters stated in above note.    Toshiary panel ordered      Skyler Velázquez M.D.  "

## 2020-06-19 ENCOUNTER — APPOINTMENT (OUTPATIENT)
Dept: ONCOLOGY | Facility: MEDICAL CENTER | Age: 70
End: 2020-06-19
Attending: INTERNAL MEDICINE
Payer: COMMERCIAL

## 2020-06-21 ENCOUNTER — APPOINTMENT (OUTPATIENT)
Dept: ONCOLOGY | Facility: MEDICAL CENTER | Age: 70
End: 2020-06-21
Attending: INTERNAL MEDICINE
Payer: COMMERCIAL

## 2020-06-24 NOTE — PROGRESS NOTES
"Non face to face prolonged services of clinical data and chart information reviewed for a total time of 30 performed on 6/23 and 6/24 for Gigi Pagan  Reviewed were:    Referral    Previous encounters    Imaging all mammography, colonoscopy, CT/tomography, MRI and PET procedures    Previous procedures all biopsy and surgical procedures including pathology studies and interpretation    Notes    Media all personal and family clinical histories as performed at outside facilities including molecular DNA germline studies    Miscellaneous reports    Patient summaries family history as documented by referring clinician  Counseling, testing information and materials prepared  \"I spent 30 minutes  from 0600 to 0630 reviewing past records, prior to visit pertaining to genetic risk.\"   Skyler Velázquez M.D.  "

## 2020-06-25 ENCOUNTER — OFFICE VISIT (OUTPATIENT)
Dept: HEMATOLOGY ONCOLOGY | Facility: MEDICAL CENTER | Age: 70
End: 2020-06-25

## 2020-06-25 ENCOUNTER — APPOINTMENT (OUTPATIENT)
Dept: HEMATOLOGY ONCOLOGY | Facility: MEDICAL CENTER | Age: 70
End: 2020-06-25
Payer: COMMERCIAL

## 2020-06-25 DIAGNOSIS — Z80.9 FAMILY HISTORY OF CANCER: ICD-10-CM

## 2020-06-25 PROCEDURE — 99441 PR PHYSICIAN TELEPHONE EVALUATION 5-10 MIN: CPT | Mod: CR | Performed by: MEDICAL GENETICS

## 2020-06-25 NOTE — PROGRESS NOTES
Telephone Appointment Visit   As a means of avoiding spread of COVID-19, this visit is being conducted by telephone. This telephone visit was initiated by the patient and they verbally consented.    Time at start of call: 0833    Reason for Call:  Lab Follow-up    HPI:    Patient originally referred for cancer genetic risk assessment, counseling and testing  Patient presents today to discuss results     Labs / Images Reviewed:   Results:  [facility]:    NEGATIVE: No Clinically significant variants detected in 67 gene panel (CancerNext-expanded)     Assessment and Plan:     There are no diagnoses linked to this encounter.    Follow-up: none indicated    Time at end of call: 0804  Total Time Spent: 5-10 minutes    Skyler Velázquez M.D.

## 2020-07-12 ENCOUNTER — APPOINTMENT (OUTPATIENT)
Dept: ONCOLOGY | Facility: MEDICAL CENTER | Age: 70
End: 2020-07-12
Attending: INTERNAL MEDICINE
Payer: COMMERCIAL

## 2020-07-23 ENCOUNTER — APPOINTMENT (OUTPATIENT)
Dept: RADIOLOGY | Facility: MEDICAL CENTER | Age: 70
End: 2020-07-23
Attending: EMERGENCY MEDICINE
Payer: COMMERCIAL

## 2020-07-23 ENCOUNTER — HOSPITAL ENCOUNTER (EMERGENCY)
Facility: MEDICAL CENTER | Age: 70
End: 2020-07-23
Attending: EMERGENCY MEDICINE
Payer: COMMERCIAL

## 2020-07-23 VITALS
RESPIRATION RATE: 18 BRPM | OXYGEN SATURATION: 97 % | SYSTOLIC BLOOD PRESSURE: 99 MMHG | TEMPERATURE: 98.5 F | DIASTOLIC BLOOD PRESSURE: 57 MMHG | WEIGHT: 166.45 LBS | HEART RATE: 79 BPM | HEIGHT: 64 IN | BODY MASS INDEX: 28.42 KG/M2

## 2020-07-23 DIAGNOSIS — S63.502A SPRAIN OF LEFT WRIST, INITIAL ENCOUNTER: ICD-10-CM

## 2020-07-23 DIAGNOSIS — S00.81XA ABRASION OF FACE, INITIAL ENCOUNTER: ICD-10-CM
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
## 2020-07-23 PROCEDURE — 99284 EMERGENCY DEPT VISIT MOD MDM: CPT

## 2020-07-23 PROCEDURE — 73110 X-RAY EXAM OF WRIST: CPT | Mod: LT

## 2020-07-23 PROCEDURE — 73100 X-RAY EXAM OF WRIST: CPT | Mod: LT

## 2020-07-23 PROCEDURE — 71046 X-RAY EXAM CHEST 2 VIEWS: CPT

## 2020-07-23 ASSESSMENT — FIBROSIS 4 INDEX: FIB4 SCORE: 2.04

## 2020-07-24 ENCOUNTER — HOSPITAL ENCOUNTER (EMERGENCY)
Facility: MEDICAL CENTER | Age: 70
End: 2020-07-24
Attending: EMERGENCY MEDICINE
Payer: COMMERCIAL

## 2020-07-24 VITALS
HEIGHT: 66 IN | BODY MASS INDEX: 25.83 KG/M2 | OXYGEN SATURATION: 96 % | WEIGHT: 160.72 LBS | HEART RATE: 107 BPM | SYSTOLIC BLOOD PRESSURE: 115 MMHG | TEMPERATURE: 98.6 F | DIASTOLIC BLOOD PRESSURE: 75 MMHG | RESPIRATION RATE: 16 BRPM

## 2020-07-24 DIAGNOSIS — M25.531 WRIST PAIN, ACUTE, RIGHT: ICD-10-CM

## 2020-07-24 PROCEDURE — 700102 HCHG RX REV CODE 250 W/ 637 OVERRIDE(OP): Performed by: EMERGENCY MEDICINE

## 2020-07-24 PROCEDURE — 302874 HCHG BANDAGE ACE 2 OR 3""

## 2020-07-24 PROCEDURE — 99283 EMERGENCY DEPT VISIT LOW MDM: CPT

## 2020-07-24 PROCEDURE — 29125 APPL SHORT ARM SPLINT STATIC: CPT

## 2020-07-24 PROCEDURE — A9270 NON-COVERED ITEM OR SERVICE: HCPCS | Performed by: EMERGENCY MEDICINE

## 2020-07-24 RX ORDER — TRAMADOL HYDROCHLORIDE 50 MG/1
50 TABLET ORAL ONCE
Status: COMPLETED | OUTPATIENT
Start: 2020-07-24 | End: 2020-07-24

## 2020-07-24 RX ADMIN — TRAMADOL HYDROCHLORIDE 50 MG: 50 TABLET, FILM COATED ORAL at 14:18

## 2020-07-24 ASSESSMENT — FIBROSIS 4 INDEX: FIB4 SCORE: 2.04

## 2020-07-24 NOTE — ED NOTES
Pt is tearful, states the pain is unbearable, ice elevation and pain meds are not helping. She is in a velcro splint, reporting that the xrays were not conclusive for fracture she was suposed to follow-up with [facility].

## 2020-07-24 NOTE — ED NOTES
Pt tolerated PO food moderately well   Cleared for d/c  dischg instructions given to pt  Verbally understands  D/c'ed to home in NAD  Aware to f/u w/ ortho as needed  Daughter driving pt home

## 2020-07-24 NOTE — ED NOTES
Walked with this pt to the lobby for ride, she would say that she is worse than when she got here, again asked her if she needed to see another ERP, she denied. Walked to lobby crying outwardly.

## 2020-07-24 NOTE — ED NOTES
Splint applied by Dr. Dickens. This pt was medicated per order, she is very tearful. I advised her to elevate her left arm and follow-up at the [facility], I asked over and over how else to help, if she needed to see the ERP again and she denied this need. Released to ride.

## 2020-07-24 NOTE — ED TRIAGE NOTES
"Chief Complaint   Patient presents with   • Wrist Injury     reports is unable to tolerate pain, seen yesterday for fall w/ wrist injury     /64   Pulse 98   Temp 37 °C (98.6 °F) (Temporal)   Resp 16   Ht 1.676 m (5' 6\")   Wt 72.9 kg (160 lb 11.5 oz)   SpO2 98%   BMI 25.94 kg/m²       Covid Screen Negative  "

## 2020-07-24 NOTE — ED PROVIDER NOTES
"ED Provider Note      CHIEF COMPLAINT   Chief Complaint   Patient presents with   • Wrist Injury     reports is unable to tolerate pain, seen yesterday for fall w/ wrist injury       HPI   Gigi Pagan is a 69 y.o. female who presents to the emergency department for repeat visit 1 day after a ground-level fall.  Walking on an unknown even surface.  Fell on outstretched hands.  Injured her left wrist.  She had an x-ray here that was inconclusive.  She had a follow-up x-ray that did not demonstrate any acute fractures.  She was placed in a Velcro wrist splint.  Plan was for follow-up with orthopedics.  She states her pain worsened overnight.  It became severe.  She took a tramadol that she had at home.  Improved pain for about 2 hours.  She iced and elevated but the pain was persistent.  Because of the severity of symptoms she returns.  She denies numbness tingling or weakness.  She has pain with any movement of the wrist.  Throbbing character.  Nonradiating.  Denies any other symptoms.    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   • Cholangiocarcinoma (HCC) 2010   • Hypothyroidism     hypo   • Indigestion    • Jaundice     this admission 03/29 per pt she came in with jaundice   • Pneumonia     hx       SOCIAL HISTORY  Social History     Tobacco Use   • Smoking status: Never Smoker   • Smokeless tobacco: Never Used   Substance Use Topics   • Alcohol use: Yes     Comment: Occasional   • Drug use: No       ALLERGIES   See chart    PHYSICAL EXAM  VITAL SIGNS: /64   Pulse 98   Temp 37 °C (98.6 °F) (Temporal)   Resp 16   Ht 1.676 m (5' 6\")   Wt 72.9 kg (160 lb 11.5 oz)   SpO2 98%   BMI 25.94 kg/m²   Head: " Atraumatic  Eyes: Eyes normal inspection  Neck: has full range of motion, normal inspection.  Constitutional: No acute distress   Cardiovascular: Normal heart rate. Pulses strong radial pulse  Thorax & Lungs: No respiratory distress  Skin: Intact  Musculoskeletal: Tenderness primarily over the distal radius anatomic snuffbox region.  There is swelling in this region.  No deformity.  Compartments soft.  Neurologic:  Normal sensory and motor    RADIOLOGY/PROCEDURES  Viewed x-rays from yesterday as noted and stated in the HPI    Procedure: Splint application  Description: Patient was positioned appropriately.  Wrist was padded because of tenderness.  Ortho-Glass was applied.  Subsequently wrapped with an Ace bandage.  Good peripheral perfusion and appropriate immobilization.    COURSE & MEDICAL DECISION MAKING  Analgesia for possible fracture-tramadol    Patient presents with a wrist pain after trauma.  She has tenderness in the anatomic snuffbox.  She had a negative x-ray.  I suspect most likely occult scaphoid fracture.  She was given 1 tramadol in the ER.  She will be placed in a OCL left thumb spica splint.  She will need to see orthopedics for further assessment.  Of asked her to go to the [facility] today or follow-up with Dr. Manuel.  Return to the ER for uncontrolled symptoms or concern.    FINAL IMPRESSION  1.  Left wrist injury, suspected occult scaphoid fracture  2.  Thumb spica splint application by me    This dictation was created using voice recognition software. The accuracy of the dictation is limited to the abilities of the software. I expect there may be some errors of grammar and possibly content. The nursing notes were reviewed and certain aspects of this information were incorporated into this note.      Electronically signed by: Morgan Dickens M.D., 7/24/2020 1:55 PM

## 2020-07-24 NOTE — ED PROVIDER NOTES
ED Provider Note    CHIEF COMPLAINT  Chief Complaint   Patient presents with   • Fall     1 hr pta   • Facial Injury     abrasion left check   • Wrist Pain     left       HPI  Gigi Pagan is a 69 y.o. female who presents following a mechanical ground-level fall.  She states that she turned quickly and stumbled on a uneven surface while walking her dog and fell onto outstretched hands.  She struck her left face on the ground with a small abrasion there.  Primary complaint is left wrist pain and upper abdomen/chest pain after fall.  Denies any direct blunt trauma to her chest or abdomen.  No vomiting.  Significantly, she had a Whipple procedure done in mid  approximately 5 weeks ago.  No complications and staples are removed from the surgical site.  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    REVIEW OF SYSTEMS  See HPI for further details. All other systems are negative.     PAST MEDICAL HISTORY   has a past medical history of Anesthesia, Arthritis, ASTHMA, Backpain, Bronchitis, Cancer (HCC), Cholangiocarcinoma (HCC) (), Hypothyroidism, Indigestion, Jaundice, and Pneumonia.    SOCIAL HISTORY  Social History     Tobacco Use   • Smoking status: Never Smoker   • Smokeless tobacco: Never Used   Substance and Sexual Activity   • Alcohol use: Yes     Comment: Occasional   • Drug use: No   • Sexual activity: Not on file       SURGICAL HISTORY   has a past surgical history that includes other orthopedic surgery; other; ercp w/ insertion stent/tube (3/29/2010); ercp w/sphincterotomy/papill. (3/29/2010); ercp in or (2010);  delivery only; gyn surgery (); resec liver,part lobectomy (); pelviscopy (2011); omentectomy (2011); biopsy general (2011); primary c section; oophorectomy (2011); and whipple procedure.    CURRENT MEDICATIONS  Home Medications     Reviewed by Susan Diaz R.N. (Registered Nurse) on 20 at 1910  Med List Status: Partial   Medication  "Last Dose Status   albuterol 108 (90 Base) MCG/ACT Aero Soln inhalation aerosol prn Active   escitalopram (LEXAPRO) 5 MG tablet not taking Active   fluticasone (FLONASE) 50 MCG/ACT nasal spray not taking Active   gentamicin (GARAMYCIN) 0.3 % Solution not taking Active   NUCYNTA 50 MG Tab past week Active   SUMAtriptan (IMITREX) 25 MG Tab tablet nottaking Active   SYNTHROID 75 MCG TABS 7/23/2020 Active   tramadol (ULTRAM) 50 MG Tab past week Active                ALLERGIES  Allergies   Allergen Reactions   • Gadolinium Anaphylaxis   • Iodine Anaphylaxis   • Oxycodone Itching   • Tape Contact Dermatitis       PHYSICAL EXAM  VITAL SIGNS: /67   Pulse 98   Temp 36.6 °C (97.9 °F) (Temporal)   Resp 18   Ht 1.613 m (5' 3.5\")   Wt 75.5 kg (166 lb 7.2 oz)   SpO2 97%   Breastfeeding No   BMI 29.02 kg/m²   Pulse ox interpretation: I interpret this pulse ox as normal.  Constitutional: Alert in no apparent distress.  HENT: Mild abrasion to the left face without active bleeding, Bilateral external ears normal, Nose normal.   Eyes: Pupils are equal and reactive, Conjunctiva normal, Non-icteric.   Neck: Normal range of motion, No tenderness, Supple, No stridor.   Cardiovascular: Regular rate and rhythm.   Thorax & Lungs: Normal breath sounds, No respiratory distress, No wheezing, No chest tenderness.   Abdomen: Bowel sounds normal, Soft, mild diffuse nonlocalized tenderness, surgical wounds well-healed.  No visible hernias, no masses, No pulsatile masses. No peritoneal signs.  Skin: Warm, Dry, No erythema, No rash.   Back: No bony tenderness, No CVA tenderness.   Extremities: Intact distal pulses, No cyanosis  Musculoskeletal: Limited range of motion to the left wrist.  No major deformities noted.  Tenderness to the left wrist with localized swelling and bruising  Neurologic: Alert, Normal motor function and gait, Normal sensory function, No focal deficits noted.       DIAGNOSTIC STUDIES / " "PROCEDURES      RADIOLOGY  DX-WRIST-LIMITED 2- LEFT   Final Result         1.  No radiographic evidence of acute traumatic injury.   2.  Degenerative changes of wrist      DX-WRIST-COMPLETE 3+ LEFT   Final Result         1.  Rotation of the lunate anteriorly, appearance suggests partial perilunate dislocation   2.  Hazy hyperdensity in the wrist joint space, likely calcified patient of the TFCC.      DX-CHEST-2 VIEWS   Final Result         1.  No acute cardiopulmonary disease.            COURSE & MEDICAL DECISION MAKING    Medications - No data to display    Pertinent Labs & Imaging studies reviewed. (See chart for details)  69 y.o. female presenting after a fall onto outstretched hands after a trip and fall while walking her dog.  Has an abrasion to the left face.  No loss of consciousness.  No chest pain or trouble breathing prior to fall.  No headaches.  No nausea or vomiting or neck pain or back pain.  Patient's chief complaint is left wrist pain.  She is right-hand dominant.  No open wounds.  Has tenderness throughout the left wrist with limited range of motion secondary to pain.  Also has left sided upper abdominal pain with worsening pain with deep inspiration.  Chest x-ray was unremarkable.  Patient is concerned because of a recent Whipple procedure over 5 weeks ago at [place].  No acute complications with the immediate postoperative period however she does have worsening pain after her recent fall.  Denies any direct trauma to her abdomen.  No nausea or vomiting.  No bloody or black stool history.    With regards to the patient's left wrist, initial x-ray showed possible \"partial perilunate dislocation\".  I spoke with Dr. Manuel from orthopedic surgery.  He is recommending repeat imaging with better lateral images and to follow-up with orthopedic surgery on an outpatient basis if they are negative.  Images were repeated and were found to be unremarkable with no evidence of dislocation or fracture.  " "Patient was placed in a Velcro splint and encouraged to follow-up as needed with orthopedic surgery.    I did discuss risks and benefits of CT abdomen pelvis.  I do think it is rather low likelihood that the patient has a significant intra-abdominal injury that would be identified on CT abdomen pelvis.  She was provided with a p.o. challenge and tolerated oral intake including solids and liquids without difficulty.  Patient was reevaluated at bedside prior to discharge.  She states that she continues to have some soreness to her abdomen and left wrist.  Using shared decision making, it was decided to forego CT imaging of the abdomen.  She was given very strict return precautions however for any worsening of her symptoms or development of any other concerning signs or symptoms such as vomiting or worsening pain or fever.    The patient was instructed to follow-up with primary care physician for further management.  To return immediately for any worsening symptoms or development of any other concerning signs or symptoms. The patient verbalizes understanding in their own words.    /67   Pulse 98   Temp 36.6 °C (97.9 °F) (Temporal)   Resp 18   Ht 1.613 m (5' 3.5\")   Wt 75.5 kg (166 lb 7.2 oz)   SpO2 97%   Breastfeeding No   BMI 29.02 kg/m²     The patient was referred to primary care where they will receive further BP management.      Marcia Knight M.D.  [address] [address] [phone]    Schedule an appointment as soon as possible for a visit       [facility], Emergency Dept  [address]  [facility] [address] [phone]    As needed, If symptoms worsen    Henrry Manuel M.D.  [address] [facility] [address] [phone]    Schedule an appointment as soon as possible for a visit         FINAL IMPRESSION  1. Fall, initial encounter    2. Sprain of left wrist, initial encounter    3. Abrasion of face, initial encounter      "       Electronically signed by: Tim Valencia M.D., 7/23/2020 7:36 PM

## 2020-07-24 NOTE — ED TRIAGE NOTES
Pt to er with c/o left wrist pain and facial abrasion to left cheek after fall while walking dog approx 1 hr pta. Ice to left forearm noted in triage, denies loc/use of blood thinners states recent whipple procedure at [facility] in June, otherwise no travel, denies known covid exposure, unknwon last tetanus

## 2020-12-17 ENCOUNTER — HOSPITAL ENCOUNTER (OUTPATIENT)
Facility: MEDICAL CENTER | Age: 70
End: 2020-12-17
Attending: ORTHOPAEDIC SURGERY | Admitting: ORTHOPAEDIC SURGERY
Payer: COMMERCIAL

## 2020-12-30 ENCOUNTER — APPOINTMENT (OUTPATIENT)
Dept: ADMISSIONS | Facility: MEDICAL CENTER | Age: 70
End: 2020-12-30
Payer: COMMERCIAL

## 2021-03-03 DIAGNOSIS — Z23 NEED FOR VACCINATION: ICD-10-CM

## 2022-09-17 ENCOUNTER — APPOINTMENT (OUTPATIENT)
Dept: RADIOLOGY | Facility: MEDICAL CENTER | Age: 72
End: 2022-09-17
Attending: EMERGENCY MEDICINE
Payer: MEDICARE

## 2022-09-18 ENCOUNTER — APPOINTMENT (OUTPATIENT)
Dept: RADIOLOGY | Facility: MEDICAL CENTER | Age: 72
End: 2022-09-18
Attending: EMERGENCY MEDICINE
Payer: MEDICARE

## 2022-09-18 ENCOUNTER — APPOINTMENT (OUTPATIENT)
Dept: CARDIOLOGY | Facility: MEDICAL CENTER | Age: 72
End: 2022-09-18
Attending: STUDENT IN AN ORGANIZED HEALTH CARE EDUCATION/TRAINING PROGRAM
Payer: MEDICARE

## 2022-09-18 ENCOUNTER — HOSPITAL ENCOUNTER (OUTPATIENT)
Facility: MEDICAL CENTER | Age: 72
End: 2022-09-19
Attending: EMERGENCY MEDICINE | Admitting: STUDENT IN AN ORGANIZED HEALTH CARE EDUCATION/TRAINING PROGRAM
Payer: MEDICARE

## 2022-09-18 DIAGNOSIS — I95.1 ORTHOSTATIC HYPOTENSION: ICD-10-CM

## 2022-09-18 DIAGNOSIS — R55 NEAR SYNCOPE: ICD-10-CM

## 2022-09-18 DIAGNOSIS — R79.89 POSITIVE D DIMER: ICD-10-CM

## 2022-09-18 PROBLEM — R42 POSTURAL DIZZINESS WITH PRESYNCOPE: Status: ACTIVE | Noted: 2022-09-18

## 2022-09-18 LAB
ALBUMIN SERPL BCP-MCNC: 3.6 G/DL (ref 3.2–4.9)
ALBUMIN/GLOB SERPL: 1.6 G/DL
ALP SERPL-CCNC: 74 U/L (ref 30–99)
ALT SERPL-CCNC: 12 U/L (ref 2–50)
ANION GAP SERPL CALC-SCNC: 12 MMOL/L (ref 7–16)
APTT PPP: 24.3 SEC (ref 24.7–36)
AST SERPL-CCNC: 36 U/L (ref 12–45)
BASOPHILS # BLD AUTO: 0.6 % (ref 0–1.8)
BASOPHILS # BLD: 0.02 K/UL (ref 0–0.12)
BILIRUB SERPL-MCNC: 0.9 MG/DL (ref 0.1–1.5)
BUN SERPL-MCNC: 11 MG/DL (ref 8–22)
CALCIUM SERPL-MCNC: 8.8 MG/DL (ref 8.5–10.5)
CHLORIDE SERPL-SCNC: 107 MMOL/L (ref 96–112)
CO2 SERPL-SCNC: 21 MMOL/L (ref 20–33)
CREAT SERPL-MCNC: 0.58 MG/DL (ref 0.5–1.4)
D DIMER PPP IA.FEU-MCNC: 1 UG/ML (FEU) (ref 0–0.5)
EKG IMPRESSION: NORMAL
EOSINOPHIL # BLD AUTO: 0.01 K/UL (ref 0–0.51)
EOSINOPHIL NFR BLD: 0.3 % (ref 0–6.9)
ERYTHROCYTE [DISTWIDTH] IN BLOOD BY AUTOMATED COUNT: 46.4 FL (ref 35.9–50)
GFR SERPLBLD CREATININE-BSD FMLA CKD-EPI: 97 ML/MIN/1.73 M 2
GLOBULIN SER CALC-MCNC: 2.3 G/DL (ref 1.9–3.5)
GLUCOSE SERPL-MCNC: 122 MG/DL (ref 65–99)
HCT VFR BLD AUTO: 43.6 % (ref 37–47)
HGB BLD-MCNC: 14.1 G/DL (ref 12–16)
IMM GRANULOCYTES # BLD AUTO: 0.01 K/UL (ref 0–0.11)
IMM GRANULOCYTES NFR BLD AUTO: 0.3 % (ref 0–0.9)
INR PPP: 1.13 (ref 0.87–1.13)
LIPASE SERPL-CCNC: 129 U/L (ref 11–82)
LYMPHOCYTES # BLD AUTO: 1.07 K/UL (ref 1–4.8)
LYMPHOCYTES NFR BLD: 30.7 % (ref 22–41)
MAGNESIUM SERPL-MCNC: 1.9 MG/DL (ref 1.5–2.5)
MCH RBC QN AUTO: 29 PG (ref 27–33)
MCHC RBC AUTO-ENTMCNC: 32.3 G/DL (ref 33.6–35)
MCV RBC AUTO: 89.7 FL (ref 81.4–97.8)
MONOCYTES # BLD AUTO: 0.23 K/UL (ref 0–0.85)
MONOCYTES NFR BLD AUTO: 6.6 % (ref 0–13.4)
NEUTROPHILS # BLD AUTO: 2.15 K/UL (ref 2–7.15)
NEUTROPHILS NFR BLD: 61.5 % (ref 44–72)
NRBC # BLD AUTO: 0 K/UL
NRBC BLD-RTO: 0 /100 WBC
PLATELET # BLD AUTO: 90 K/UL (ref 164–446)
PMV BLD AUTO: 10.9 FL (ref 9–12.9)
POTASSIUM SERPL-SCNC: 3.8 MMOL/L (ref 3.6–5.5)
PROT SERPL-MCNC: 5.9 G/DL (ref 6–8.2)
PROTHROMBIN TIME: 14.3 SEC (ref 12–14.6)
RBC # BLD AUTO: 4.86 M/UL (ref 4.2–5.4)
SODIUM SERPL-SCNC: 140 MMOL/L (ref 135–145)
TROPONIN T SERPL-MCNC: <6 NG/L (ref 6–19)
TSH SERPL DL<=0.005 MIU/L-ACNC: 3.35 UIU/ML (ref 0.38–5.33)
WBC # BLD AUTO: 3.5 K/UL (ref 4.8–10.8)

## 2022-09-18 PROCEDURE — 93306 TTE W/DOPPLER COMPLETE: CPT

## 2022-09-18 PROCEDURE — 99285 EMERGENCY DEPT VISIT HI MDM: CPT

## 2022-09-18 PROCEDURE — 93970 EXTREMITY STUDY: CPT

## 2022-09-18 PROCEDURE — 85730 THROMBOPLASTIN TIME PARTIAL: CPT

## 2022-09-18 PROCEDURE — 36415 COLL VENOUS BLD VENIPUNCTURE: CPT

## 2022-09-18 PROCEDURE — 71045 X-RAY EXAM CHEST 1 VIEW: CPT

## 2022-09-18 PROCEDURE — A9270 NON-COVERED ITEM OR SERVICE: HCPCS | Performed by: STUDENT IN AN ORGANIZED HEALTH CARE EDUCATION/TRAINING PROGRAM

## 2022-09-18 PROCEDURE — 83690 ASSAY OF LIPASE: CPT

## 2022-09-18 PROCEDURE — 85025 COMPLETE CBC W/AUTO DIFF WBC: CPT

## 2022-09-18 PROCEDURE — 84484 ASSAY OF TROPONIN QUANT: CPT

## 2022-09-18 PROCEDURE — 84443 ASSAY THYROID STIM HORMONE: CPT

## 2022-09-18 PROCEDURE — 83735 ASSAY OF MAGNESIUM: CPT

## 2022-09-18 PROCEDURE — G0378 HOSPITAL OBSERVATION PER HR: HCPCS

## 2022-09-18 PROCEDURE — 70450 CT HEAD/BRAIN W/O DYE: CPT

## 2022-09-18 PROCEDURE — 99220 PR INITIAL OBSERVATION CARE,LEVL III: CPT | Performed by: STUDENT IN AN ORGANIZED HEALTH CARE EDUCATION/TRAINING PROGRAM

## 2022-09-18 PROCEDURE — 85610 PROTHROMBIN TIME: CPT

## 2022-09-18 PROCEDURE — 85379 FIBRIN DEGRADATION QUANT: CPT

## 2022-09-18 PROCEDURE — 93005 ELECTROCARDIOGRAM TRACING: CPT

## 2022-09-18 PROCEDURE — 80053 COMPREHEN METABOLIC PANEL: CPT

## 2022-09-18 PROCEDURE — 700102 HCHG RX REV CODE 250 W/ 637 OVERRIDE(OP): Performed by: STUDENT IN AN ORGANIZED HEALTH CARE EDUCATION/TRAINING PROGRAM

## 2022-09-18 RX ORDER — AMOXICILLIN 250 MG
2 CAPSULE ORAL 2 TIMES DAILY
Status: DISCONTINUED | OUTPATIENT
Start: 2022-09-18 | End: 2022-09-19 | Stop reason: HOSPADM

## 2022-09-18 RX ORDER — BISACODYL 10 MG
10 SUPPOSITORY, RECTAL RECTAL
Status: DISCONTINUED | OUTPATIENT
Start: 2022-09-18 | End: 2022-09-19 | Stop reason: HOSPADM

## 2022-09-18 RX ORDER — ACETAMINOPHEN 325 MG/1
650 TABLET ORAL EVERY 6 HOURS PRN
Status: DISCONTINUED | OUTPATIENT
Start: 2022-09-18 | End: 2022-09-19 | Stop reason: HOSPADM

## 2022-09-18 RX ORDER — POLYETHYLENE GLYCOL 3350 17 G/17G
1 POWDER, FOR SOLUTION ORAL
Status: DISCONTINUED | OUTPATIENT
Start: 2022-09-18 | End: 2022-09-19 | Stop reason: HOSPADM

## 2022-09-18 RX ADMIN — RIVAROXABAN 10 MG: 10 TABLET, FILM COATED ORAL at 19:18

## 2022-09-18 ASSESSMENT — ENCOUNTER SYMPTOMS
NAUSEA: 0
SEIZURES: 0
ABDOMINAL PAIN: 0
PALPITATIONS: 0
FEVER: 0
BACK PAIN: 0
SHORTNESS OF BREATH: 0
SENSORY CHANGE: 0
FOCAL WEAKNESS: 0
DIAPHORESIS: 0
VOMITING: 0
EYE PAIN: 0
COUGH: 0
CHILLS: 0
LOSS OF CONSCIOUSNESS: 0
DIARRHEA: 0
WEIGHT LOSS: 1
FALLS: 0
CONSTIPATION: 0
FLANK PAIN: 0
INSOMNIA: 0
DIZZINESS: 1
BLURRED VISION: 0
HEADACHES: 0

## 2022-09-18 ASSESSMENT — LIFESTYLE VARIABLES
CONSUMPTION TOTAL: NEGATIVE
EVER FELT BAD OR GUILTY ABOUT YOUR DRINKING: NO
HOW MANY TIMES IN THE PAST YEAR HAVE YOU HAD 5 OR MORE DRINKS IN A DAY: 0
TOTAL SCORE: 0
AVERAGE NUMBER OF DAYS PER WEEK YOU HAVE A DRINK CONTAINING ALCOHOL: 0
SUBSTANCE_ABUSE: 0
HAVE YOU EVER FELT YOU SHOULD CUT DOWN ON YOUR DRINKING: NO
HAVE PEOPLE ANNOYED YOU BY CRITICIZING YOUR DRINKING: NO
EVER HAD A DRINK FIRST THING IN THE MORNING TO STEADY YOUR NERVES TO GET RID OF A HANGOVER: NO
ALCOHOL_USE: YES
TOTAL SCORE: 0
TOTAL SCORE: 0
ON A TYPICAL DAY WHEN YOU DRINK ALCOHOL HOW MANY DRINKS DO YOU HAVE: 1

## 2022-09-18 ASSESSMENT — PATIENT HEALTH QUESTIONNAIRE - PHQ9
1. LITTLE INTEREST OR PLEASURE IN DOING THINGS: NOT AT ALL
SUM OF ALL RESPONSES TO PHQ9 QUESTIONS 1 AND 2: 0
2. FEELING DOWN, DEPRESSED, IRRITABLE, OR HOPELESS: NOT AT ALL

## 2022-09-18 ASSESSMENT — PAIN DESCRIPTION - PAIN TYPE: TYPE: ACUTE PAIN

## 2022-09-18 ASSESSMENT — FIBROSIS 4 INDEX: FIB4 SCORE: 8.08

## 2022-09-18 NOTE — ED PROVIDER NOTES
"ED Provider Note    Scribed for Marilu Tyler M.D. by Josh Ahuja. 9/18/2022  10:54 AM    Primary care provider: Marcia Knight M.D.  Means of arrival: EMS  History obtained from: EMS/Patient  History limited by: None    CHIEF COMPLAINT  Chief Complaint   Patient presents with    Near Syncopal     PT was at park with family when she had near syncopal episode. PT reports nothing unusual prior to episode. EMS reports PT was hypotensive upon arrival, systolic BP was 88 upon arrival. EMS provided 400mL NS enroute and pressure stabilized. Denies any current discomfort. Denies CP or SOB. Was covid + several weeks ago, tested negative for covid yesterday. PT reports she did not eat breakfast,  per EMS.       HPI  Gigi Pagan is a 72 y.o. female who presents to the Emergency Department for near syncopal episode. Patient states this morning she was at the dog park with her family when she suddenly felt very \"woozy\", like she was going to pass out. Aside from a doughnut she was eating at that moment, she had not eaten or drank anything this morning. Patient called EMS who found her to be hypotensive at 88 systolic and administered 400 mL IVF. She denies any nausea, vomiting, dizziness, diaphoresis, chest pain, heart palpitations, leg swelling, cough, fevers, or weakness. Patient has been experiencing headaches for the last couple days. She also was recently recovering from COVID, tested negative yesterday. At the present moment she feeling normally.  During the episode of feeling near syncopal she denied having any chest pain unilateral weakness numbness or shortness of breath or sweating.  She did not have any nausea.    Review of past medical records show that she has a history of cholangiocarcinoma and underwent a Whipple Procedure in June of 2020.     Cording to the patient's daughter she has been increasingly weak and somewhat confused at home.  She also has a headache almost every day.  She has " not had any falls or trauma.  Her daughter takes care of all of the patient's ADLs.    REVIEW OF SYSTEMS  HEENT:  No ear pain, congestion, or sore throat   CARDIAC: no chest pain, no palpitations    PULMONARY: no dyspnea, cough, or congestion   GI: no vomiting, diarrhea, or abdominal pain   : no dysuria, back pain, or hematuria   Neuro: Positive for near syncope. No weakness, numbness, aphasia, or headache  Musculoskeletal: no swelling, deformity, pain, or joint swelling  Endocrine: no fevers, sweating  SKIN: no rash, erythema, or contusions     See history of present illness. All other systems are negative. C.    PAST MEDICAL HISTORY   has a past medical history of Anesthesia, Arthritis, ASTHMA, Backpain, Bronchitis, Cancer (HCC), Cholangiocarcinoma (HCC) (), Hypothyroidism, Indigestion, Jaundice, and Pneumonia.    SURGICAL HISTORY   has a past surgical history that includes other orthopedic surgery; other; ercp w/ insertion stent/tube (3/29/2010); ercp w/sphincterotomy/papill. (3/29/2010); ercp in or (2010);  delivery only; gyn surgery (); resec liver,part lobectomy (); pelviscopy (2011); omentectomy (2011); biopsy general (2011); primary c section; oophorectomy (2011); and whipple procedure.    SOCIAL HISTORY  Social History     Tobacco Use    Smoking status: Never    Smokeless tobacco: Never   Vaping Use    Vaping Use: Never used   Substance Use Topics    Alcohol use: Yes     Comment: Occasional    Drug use: No      Social History     Substance and Sexual Activity   Drug Use No       FAMILY HISTORY  No family history on file.    CURRENT MEDICATIONS  Home Medications    **Home medications have not yet been reviewed for this encounter**         ALLERGIES  Allergies   Allergen Reactions    Gadolinium Anaphylaxis    Iodine Anaphylaxis    Oxycodone Itching    Tape Contact Dermatitis       PHYSICAL EXAM  VITAL SIGNS: /64   Pulse (!) 55   Temp 36.6 °C (97.9 °F)  "(Temporal)   Resp 13   Ht 1.626 m (5' 4\")   Wt 56.2 kg (124 lb)   SpO2 98%   BMI 21.28 kg/m²     Constitutional: Well developed, Well nourished, No acute distress, Non-toxic appearance.   HEENT: Normocephalic, Atraumatic,  external ears normal, pharynx pink,  Mucous  Membranes moist, No rhinorrhea or mucosal edema  Eyes: PERRL, EOMI, Conjunctiva normal, No discharge.   Neck: Normal range of motion, No tenderness, Supple, No stridor.   Lymphatic: No lymphadenopathy    Cardiovascular: Regular Rate and Rhythm, No murmurs,  rubs, or gallops.   Thorax & Lungs: Lungs clear to auscultation bilaterally, No respiratory distress, No wheezes, rhales or rhonchi, No chest wall tenderness.   Abdomen: Bowel sounds normal, Soft, non tender, non distended,  No pulsatile masses., no rebound guarding or peritoneal signs.   Skin: Warm, Dry, No erythema, No rash,   Back:  No CVA tenderness,  No spinal tenderness, bony crepitance, step offs, or instability.   Neurologic: Alert & oriented clear speech no focal deficits  Extremities: Equal, intact distal pulses, No cyanosis, clubbing or edema,  No tenderness.   Musculoskeletal: Good range of motion in all major joints. No tenderness to palpation or major deformities noted.     DIAGNOSTIC STUDIES / PROCEDURES    LABS  Results for orders placed or performed during the hospital encounter of 09/18/22   CBC WITH DIFFERENTIAL   Result Value Ref Range    WBC 3.5 (L) 4.8 - 10.8 K/uL    RBC 4.86 4.20 - 5.40 M/uL    Hemoglobin 14.1 12.0 - 16.0 g/dL    Hematocrit 43.6 37.0 - 47.0 %    MCV 89.7 81.4 - 97.8 fL    MCH 29.0 27.0 - 33.0 pg    MCHC 32.3 (L) 33.6 - 35.0 g/dL    RDW 46.4 35.9 - 50.0 fL    Platelet Count 90 (L) 164 - 446 K/uL    MPV 10.9 9.0 - 12.9 fL    Neutrophils-Polys 61.50 44.00 - 72.00 %    Lymphocytes 30.70 22.00 - 41.00 %    Monocytes 6.60 0.00 - 13.40 %    Eosinophils 0.30 0.00 - 6.90 %    Basophils 0.60 0.00 - 1.80 %    Immature Granulocytes 0.30 0.00 - 0.90 %    Nucleated RBC " 0.00 /100 WBC    Neutrophils (Absolute) 2.15 2.00 - 7.15 K/uL    Lymphs (Absolute) 1.07 1.00 - 4.80 K/uL    Monos (Absolute) 0.23 0.00 - 0.85 K/uL    Eos (Absolute) 0.01 0.00 - 0.51 K/uL    Baso (Absolute) 0.02 0.00 - 0.12 K/uL    Immature Granulocytes (abs) 0.01 0.00 - 0.11 K/uL    NRBC (Absolute) 0.00 K/uL   COMP METABOLIC PANEL   Result Value Ref Range    Sodium 140 135 - 145 mmol/L    Potassium 3.8 3.6 - 5.5 mmol/L    Chloride 107 96 - 112 mmol/L    Co2 21 20 - 33 mmol/L    Anion Gap 12.0 7.0 - 16.0    Glucose 122 (H) 65 - 99 mg/dL    Bun 11 8 - 22 mg/dL    Creatinine 0.58 0.50 - 1.40 mg/dL    Calcium 8.8 8.5 - 10.5 mg/dL    AST(SGOT) 36 12 - 45 U/L    ALT(SGPT) 12 2 - 50 U/L    Alkaline Phosphatase 74 30 - 99 U/L    Total Bilirubin 0.9 0.1 - 1.5 mg/dL    Albumin 3.6 3.2 - 4.9 g/dL    Total Protein 5.9 (L) 6.0 - 8.2 g/dL    Globulin 2.3 1.9 - 3.5 g/dL    A-G Ratio 1.6 g/dL   TROPONIN   Result Value Ref Range    Troponin T <6 6 - 19 ng/L   PRTOTHROMBIN TIME (INR)   Result Value Ref Range    PT 14.3 12.0 - 14.6 sec    INR 1.13 0.87 - 1.13   APTT   Result Value Ref Range    APTT 24.3 (L) 24.7 - 36.0 sec   D-DIMER   Result Value Ref Range    D-Dimer Screen 1.00 (H) 0.00 - 0.50 ug/mL (FEU)   LIPASE   Result Value Ref Range    Lipase 129 (H) 11 - 82 U/L   ESTIMATED GFR   Result Value Ref Range    GFR (CKD-EPI) 97 >60 mL/min/1.73 m 2   EKG   Result Value Ref Range    Report       [facility] Emergency Dept.    Test Date:  2022  Pt Name:    FABY HAIRSTON              Department: ER  MRN:        [id number]                      Room:       [facility]  Gender:     Female                       Technician: [id number]  :        [id number]                   Requested By:PHILIPP EDEN  Order #:    [id number]                    Reading MD: PHILIPP EDEN MD    Measurements  Intervals                                Axis  Rate:       57                           P:          70  WV:         150                           QRS:        5  QRSD:       99                           T:          15  QT:         440  QTc:        429    Interpretive Statements  Sinus bradycardia  Probable left atrial enlargement  Low voltage, precordial leads  RSR' in V1 or V2, right VCD or RVH  Compared to ECG 06/24/2018 01:03:41  Low QRS voltage now present  Right ventricular hypertrophy now present  RSR' in V1 or V2 now present  Sinus rhythm no longer present  Electronically Signed On  9- 10:50:05 PDT by PHILIPP EDEN MD       All labs reviewed by me.    EKG  12 lead EKG; Interpreted by emergency department physician as above.     RADIOLOGY  CT-HEAD W/O   Final Result         NO ACUTE ABNORMALITIES ARE NOTED ON CT SCAN OF THE HEAD.      Findings are consistent with atrophy.  Decreased attenuation in the periventricular white matter likely indicates microvascular ischemic disease.         DX-CHEST-PORTABLE (1 VIEW)   Final Result         No acute cardiac or pulmonary abnormality is identified.      US-EXTREMITY VENOUS LOWER BILAT    (Results Pending)   EC-ECHOCARDIOGRAM COMPLETE W/O CONT    (Results Pending)     The radiologist's interpretation of all radiological studies have been reviewed by me.    COURSE & MEDICAL DECISION MAKING  Nursing notes, VS, PMSFHx reviewed in chart.    10:54 AM Patient seen and examined at bedside. Ordered CT-head w/o, DX-chest, CBC w/ diff, CMP, Troponin, PT/INR, APTT, D-dimer, Lipase, and EKG to evaluate her symptoms. The differential diagnoses include but are not limited to: Complications from COVID, PE, malignancy.     12:46 PM D-dimer elevated; CTA ordered.     3:53 PM the CT was unable to be obtained because of loss of IV access repeatedly.  Cannot safely discharge her home because I cannot rule out pulmonary embolus or arrhythmia.  I explained to the patient that she should stay in the hospital and she is agreeable.  I have spoken with Dr. Brooks who agrees to admit the patient.  I have also ordered a duplex  ultrasound of her extremities.  She will be watched in the hospital on a cardiac monitor and possibly get a VQ scan to rule out pulmonary embolus.    Heart Score is: 4      FINAL IMPRESSION  1. Near syncope    2. Orthostatic hypotension    3. Positive D dimer          Josh GARIBAY (Scribe), am scribing for, and in the presence of, Marilu Tyler M.D..    Electronically signed by: Josh Ahuja (Scribe), 9/18/2022    Marilu GARIBAY M.D. personally performed the services described in this documentation, as scribed by Josh Ahuja in my presence, and it is both accurate and complete.    The note accurately reflects work and decisions made by me.  Marilu Tyler M.D.  9/18/2022  3:54 PM

## 2022-09-18 NOTE — ASSESSMENT & PLAN NOTE
Presents for presyncope while standing at park today with family, was caught by family so she did not fall  No hx of syncope, cardiac history, stroke, blood clots; hx of cholangiocarcinoma s/p whipple, in remission per patient  Hypotensive in the field, improved with IV fluids  Patient reports COVID infection 10 days ago, tested negative yesterday and reports symptoms have resolved  Monitor on telemetry  Wells score moderate given hx cancer, d dimer elevated  US LE's ordered by ERP to rule out DVT  VQ scan ordered  Orthostatic vitals  Echocardiogram ordered

## 2022-09-18 NOTE — ASSESSMENT & PLAN NOTE
History of,  S/p zay  Followed with [facility] oncology  In remission, no longer follows with oncology

## 2022-09-18 NOTE — H&P
Hospital Medicine History & Physical Note    Date of Service  9/18/2022    Primary Care Physician  Marcia Knight M.D.    Code Status  Full Code    Chief Complaint  Chief Complaint   Patient presents with    Near Syncopal     PT was at park with family when she had near syncopal episode. PT reports nothing unusual prior to episode. EMS reports PT was hypotensive upon arrival, systolic BP was 88 upon arrival. EMS provided 400mL NS enroute and pressure stabilized. Denies any current discomfort. Denies CP or SOB. Was covid + several weeks ago, tested negative for covid yesterday. PT reports she did not eat breakfast,  per EMS.       History of Presenting Illness  Gigi Pagan is a 72 y.o. female who presented 9/18/2022 with presyncope. Patient with history of cholangiocarinoma s/p whipple in remission, hypothyroidism, who presents for presyncope. Patient was at park today with family when she felt dizzy while standing. Family had to assist her down or she may have fallen. Patient denies loss of consciousness. She denies prodrome of flushing, warmth, diaphoresis, nausea, chest pain, palpitations, dyspnea. She denies post event confusion. She denies any previous presyncope, cardiac history, stroke, blood clots. She no longer follows with [facility] oncology as her last imaging was good and she was told she is in remission. She had COVID infection 10 days ago with body aches and headache but tested negative yesterday and states symptoms have resolved. She has not been taking levothyroxine for all of this year as she moved from [place] and only now has moved back into town with family. She ambulates on her own without walker or assistance.  EMS reportedly found her to be hypotensive, systolic BP 80's. She received 400cc IV fluids prior to arrival.  In the ED, vital signs stable. WBC 3.5, lipase 129. CBC and CMP otherwise unremarkable. Troponin T normal. D dimer 1. CT head with atrophy but no acute findings. CXR  clear. EKG with sinus bradycardia HR 57, no acute ischemic changes. Patient will be admitted for presyncope evaluation.    I discussed the plan of care with patient and family.    Review of Systems  Review of Systems   Constitutional:  Positive for weight loss. Negative for chills, diaphoresis, fever and malaise/fatigue.   Eyes:  Negative for blurred vision and pain.   Respiratory:  Negative for cough and shortness of breath.    Cardiovascular:  Negative for chest pain, palpitations and leg swelling.   Gastrointestinal:  Negative for abdominal pain, constipation, diarrhea, nausea and vomiting.   Genitourinary:  Negative for dysuria, flank pain, frequency and urgency.   Musculoskeletal:  Negative for back pain and falls.   Skin:  Negative for itching and rash.   Neurological:  Positive for dizziness. Negative for sensory change, focal weakness, seizures, loss of consciousness and headaches.   Psychiatric/Behavioral:  Negative for substance abuse. The patient does not have insomnia.      Past Medical History   has a past medical history of Anesthesia, Arthritis, ASTHMA, Backpain, Bronchitis, Cancer (HCC), Cholangiocarcinoma (HCC) (), Hypothyroidism, Indigestion, Jaundice, and Pneumonia.    Surgical History   has a past surgical history that includes other orthopedic surgery; other; ercp w/ insertion stent/tube (3/29/2010); ercp w/sphincterotomy/papill. (3/29/2010); ercp in or (2010); pr  delivery only; gyn surgery (); pr resec liver,part lobectomy (); pelviscopy (2011); omentectomy (2011); biopsy general (2011); primary c section; oophorectomy (2011); and whipple procedure.     Family History  family history is not on file.   Family history reviewed with patient. There is no family history that is pertinent to the chief complaint.     Social History   reports that she has never smoked. She has never used smokeless tobacco. She reports current alcohol use. She reports that  she does not use drugs.    Allergies  Allergies   Allergen Reactions    Gadolinium Anaphylaxis    Iodine Anaphylaxis    Oxycodone Itching    Tape Contact Dermatitis       Medications  Prior to Admission Medications   Prescriptions Last Dose Informant Patient Reported? Taking?   NUCYNTA 50 MG Tab   Yes No   Sig: TAKE 1 TO 2 TABLETS BY MOUTH EVERY 8 HOURS AS NEEDED FOR PAIN FOR 30 DAYS   SUMAtriptan (IMITREX) 25 MG Tab tablet   Yes No   Sig: sumatriptan 25 mg tablet   SYNTHROID 75 MCG TABS  Patient Yes No   Sig: Take 75 mcg by mouth every day.   albuterol 108 (90 Base) MCG/ACT Aero Soln inhalation aerosol  Patient Yes No   Sig: Inhale 2 Puffs by mouth every 6 hours as needed for Shortness of Breath.   escitalopram (LEXAPRO) 5 MG tablet   Yes No   fluticasone (FLONASE) 50 MCG/ACT nasal spray   No No   Sig: Spray 2 Sprays in nose every day.   gentamicin (GARAMYCIN) 0.3 % Solution   Yes No   Sig: INSTILL 1 DROP INTO AFFECTED EYE EVERY 4 HOURS   tramadol (ULTRAM) 50 MG Tab   Yes No   Sig: tramadol 50 mg tablet   TAKE 1 TABLET BY MOUTH TWICE A DAY AS NEEDED      Facility-Administered Medications: None       Physical Exam  Temp:  [36.6 °C (97.9 °F)] 36.6 °C (97.9 °F)  Pulse:  [55-69] 65  Resp:  [13-57] 19  BP: (109-127)/(56-64) 112/62  SpO2:  [94 %-99 %] 94 %  Blood Pressure : 112/62   Temperature: 36.6 °C (97.9 °F)   Pulse: 65   Respiration: 19   Pulse Oximetry: 94 %       Physical Exam  Vitals reviewed.   Constitutional:       General: She is not in acute distress.     Appearance: She is not diaphoretic.   HENT:      Head: Normocephalic and atraumatic.      Right Ear: External ear normal.      Left Ear: External ear normal.      Nose: Nose normal. No congestion.      Mouth/Throat:      Pharynx: No oropharyngeal exudate or posterior oropharyngeal erythema.   Eyes:      Extraocular Movements: Extraocular movements intact.      Pupils: Pupils are equal, round, and reactive to light.   Cardiovascular:      Rate and Rhythm:  Normal rate and regular rhythm.   Pulmonary:      Effort: Pulmonary effort is normal. No respiratory distress.      Breath sounds: Normal breath sounds. No wheezing or rales.   Abdominal:      General: Bowel sounds are normal. There is no distension.      Palpations: Abdomen is soft.      Tenderness: There is no abdominal tenderness. There is no guarding or rebound.   Musculoskeletal:         General: No swelling. Normal range of motion.      Cervical back: Normal range of motion and neck supple.      Right lower leg: No edema.      Left lower leg: No edema.   Skin:     General: Skin is warm and dry.   Neurological:      General: No focal deficit present.      Mental Status: She is alert and oriented to person, place, and time.      Cranial Nerves: No cranial nerve deficit.      Sensory: No sensory deficit.      Motor: No weakness.   Psychiatric:         Mood and Affect: Mood normal.         Behavior: Behavior normal.       Laboratory:  Recent Labs     09/18/22  1056   WBC 3.5*   RBC 4.86   HEMOGLOBIN 14.1   HEMATOCRIT 43.6   MCV 89.7   MCH 29.0   MCHC 32.3*   RDW 46.4   PLATELETCT 90*   MPV 10.9     Recent Labs     09/18/22  1056   SODIUM 140   POTASSIUM 3.8   CHLORIDE 107   CO2 21   GLUCOSE 122*   BUN 11   CREATININE 0.58   CALCIUM 8.8     Recent Labs     09/18/22  1056   ALTSGPT 12   ASTSGOT 36   ALKPHOSPHAT 74   TBILIRUBIN 0.9   LIPASE 129*   GLUCOSE 122*     Recent Labs     09/18/22  1056   APTT 24.3*   INR 1.13     No results for input(s): NTPROBNP in the last 72 hours.      Recent Labs     09/18/22  1056   TROPONINT <6       Imaging:  CT-HEAD W/O   Final Result         NO ACUTE ABNORMALITIES ARE NOTED ON CT SCAN OF THE HEAD.      Findings are consistent with atrophy.  Decreased attenuation in the periventricular white matter likely indicates microvascular ischemic disease.         DX-CHEST-PORTABLE (1 VIEW)   Final Result         No acute cardiac or pulmonary abnormality is identified.      US-EXTREMITY  VENOUS LOWER BILAT    (Results Pending)   EC-ECHOCARDIOGRAM COMPLETE W/O CONT    (Results Pending)   NM-LUNG PERFUSION IMAGING    (Results Pending)       X-Ray:  I have personally reviewed the images and compared with prior images.    Assessment/Plan:  Justification for Admission Status  I anticipate this patient is appropriate for observation status at this time because evaluation of presyncope will take <2 midnights.    Patient will need a Telemetry bed on MEDICAL service .  The need is secondary to continued cardiac monitoring and syncope evaluation.    * Postural dizziness with presyncope- (present on admission)  Assessment & Plan  Presents for presyncope while standing at park today with family, was caught by family so she did not fall  No hx of syncope, cardiac history, stroke, blood clots; hx of cholangiocarcinoma s/p whipple, in remission per patient  Hypotensive in the field, improved with IV fluids  Patient reports COVID infection 10 days ago, tested negative yesterday and reports symptoms have resolved  Monitor on telemetry  Wells score moderate given hx cancer, d dimer elevated  US LE's ordered by ERP to rule out DVT  VQ scan ordered  Orthostatic vitals  Echocardiogram ordered    Cholangiocarcinoma (HCC)- (present on admission)  Assessment & Plan  History of,  S/p whipples  Followed with [facility] oncology  In remission, no longer follows with oncology    Hypothyroidism- (present on admission)  Assessment & Plan  History of,  Has not been taking levothyroxine for past year  Check thyroid studies      VTE prophylaxis: Xarelto 10 mg daily as prophylaxis

## 2022-09-18 NOTE — ED TRIAGE NOTES
"Chief Complaint   Patient presents with    Near Syncopal     PT was at park with family when she had near syncopal episode. PT reports nothing unusual prior to episode. EMS reports PT was hypotensive upon arrival, systolic BP was 88 upon arrival. EMS provided 400mL NS enroute and pressure stabilized. Denies any current discomfort. Denies CP or SOB. Was covid + several weeks ago, tested negative for covid yesterday.       BIB EMS to [facility], pt on monitor, provided call bell and in gown, labs drawn and sent.    Medications given en route:  -400mL NS    /64   Pulse (!) 55   Temp 36.6 °C (97.9 °F) (Temporal)   Resp 13   Ht 1.626 m (5' 4\")   Wt 56.2 kg (124 lb)   SpO2 98%   BMI 21.28 kg/m²     "

## 2022-09-18 NOTE — ED NOTES
US IV in KRISTYN infiltrated prior to PT receiving CT. PIV removed, pressure dressing applied. Adeline HUBBARD to attempt US IV shortly.

## 2022-09-19 ENCOUNTER — APPOINTMENT (OUTPATIENT)
Dept: RADIOLOGY | Facility: MEDICAL CENTER | Age: 72
End: 2022-09-19
Attending: STUDENT IN AN ORGANIZED HEALTH CARE EDUCATION/TRAINING PROGRAM
Payer: MEDICARE

## 2022-09-19 ENCOUNTER — PATIENT OUTREACH (OUTPATIENT)
Dept: SCHEDULING | Facility: IMAGING CENTER | Age: 72
End: 2022-09-19
Payer: MEDICARE

## 2022-09-19 VITALS
TEMPERATURE: 98.7 F | SYSTOLIC BLOOD PRESSURE: 117 MMHG | HEART RATE: 63 BPM | RESPIRATION RATE: 18 BRPM | BODY MASS INDEX: 20.83 KG/M2 | OXYGEN SATURATION: 97 % | WEIGHT: 125 LBS | HEIGHT: 65 IN | DIASTOLIC BLOOD PRESSURE: 67 MMHG

## 2022-09-19 PROBLEM — R55 POSTURAL DIZZINESS WITH PRESYNCOPE: Status: RESOLVED | Noted: 2022-09-18 | Resolved: 2022-09-19

## 2022-09-19 PROBLEM — R42 POSTURAL DIZZINESS WITH PRESYNCOPE: Status: RESOLVED | Noted: 2022-09-18 | Resolved: 2022-09-19

## 2022-09-19 LAB
LV EJECT FRACT  99904: 60
LV EJECT FRACT MOD 2C 99903: 67.69
LV EJECT FRACT MOD 4C 99902: 55.2
LV EJECT FRACT MOD BP 99901: 61.28

## 2022-09-19 PROCEDURE — 99217 PR OBSERVATION CARE DISCHARGE: CPT | Performed by: NURSE PRACTITIONER

## 2022-09-19 PROCEDURE — G0378 HOSPITAL OBSERVATION PER HR: HCPCS

## 2022-09-19 PROCEDURE — A9270 NON-COVERED ITEM OR SERVICE: HCPCS | Performed by: STUDENT IN AN ORGANIZED HEALTH CARE EDUCATION/TRAINING PROGRAM

## 2022-09-19 PROCEDURE — 700102 HCHG RX REV CODE 250 W/ 637 OVERRIDE(OP): Performed by: STUDENT IN AN ORGANIZED HEALTH CARE EDUCATION/TRAINING PROGRAM

## 2022-09-19 PROCEDURE — 93306 TTE W/DOPPLER COMPLETE: CPT | Mod: 26 | Performed by: INTERNAL MEDICINE

## 2022-09-19 PROCEDURE — 78580 LUNG PERFUSION IMAGING: CPT

## 2022-09-19 RX ADMIN — SENNOSIDES AND DOCUSATE SODIUM 2 TABLET: 50; 8.6 TABLET ORAL at 04:42

## 2022-09-19 NOTE — CARE PLAN
Problem: Knowledge Deficit - Standard  Goal: Patient and family/care givers will demonstrate understanding of plan of care, disease process/condition, diagnostic tests and medications  Outcome: Progressing     Problem: Discharge Barriers/Planning  Goal: Patient's continuum of care needs are met  Outcome: Progressing     Problem: Nutrition  Goal: Patient's nutritional and fluid intake will be adequate or improve  Outcome: Progressing     Problem: Urinary Elimination  Goal: Establish and maintain regular urinary output  Outcome: Progressing     Problem: Mobility  Goal: Patient's capacity to carry out activities will improve  Outcome: Progressing     Problem: Self Care  Goal: Patient will have the ability to perform ADLs independently or with assistance (bathe, groom, dress, toilet and feed)  Outcome: Progressing     Problem: Infection - Standard  Goal: Patient will remain free from infection  Outcome: Progressing   The patient is Stable - Low risk of patient condition declining or worsening         Progress made toward(s) clinical / shift goals:  is progressing towards discharge    Patient is not progressing towards the following goals:

## 2022-09-19 NOTE — PROGRESS NOTES
4 Eyes Skin Assessment Completed by Irene, HAYDEE and HAYDEE Morrissey.    Head WDL  Ears WDL  Nose WDL  Mouth WDL  Neck WDL  Breast/Chest WDL  Shoulder Blades WDL  Spine WDL scar  (R) Arm/Elbow/Hand WDL  (L) Arm/Elbow/Hand WDL  Abdomen WDL  Groin WDL  Scrotum/Coccyx/Buttocks WDL  (R) Leg WDL  (L) Leg WDL  (R) Heel/Foot/Toe WDL  (L) Heel/Foot/Toe WDL          Devices In Places Pulse Ox      Interventions In Place Pillows    Possible Skin Injury No    Pictures Uploaded Into Epic N/A  Wound Consult Placed N/A  RN Wound Prevention Protocol Ordered No

## 2022-09-19 NOTE — PROGRESS NOTES
Pt being dc'd to home with no needs. Pt discharge medications none. IV dc'd. Dc instructions discussed with patient in discharge lounge. All questions answered.  Patient agreeable to dc plan.  Bedside RN to confirm that patient has all belongings at dc and that all home care needs have been arranged.

## 2022-09-19 NOTE — PROGRESS NOTES
Monitor summary: sr 67-69, IA 0.18, QRS 0.09, QT 0.44, rare pvcs per strip from monitor room.

## 2022-09-19 NOTE — PROGRESS NOTES
Received report from dedra RN. Patient in bed locked in lowest position with call light in reach. Instructed to call for assistance if he needs to get out of bed POC discussed. All questions answered.  Will continue to monitor.

## 2022-09-19 NOTE — PROGRESS NOTES
Report received, pt care assumed. Pt to floor via wheelchair, tele monitor on. Pt ambulate to bed with steady gait. Pt instructed on use of call light. Call light and personal belongings within reach of pt.

## 2022-09-19 NOTE — DISCHARGE SUMMARY
Discharge Summary    CHIEF COMPLAINT ON ADMISSION  Chief Complaint   Patient presents with    Near Syncopal     PT was at park with family when she had near syncopal episode. PT reports nothing unusual prior to episode. EMS reports PT was hypotensive upon arrival, systolic BP was 88 upon arrival. EMS provided 400mL NS enroute and pressure stabilized. Denies any current discomfort. Denies CP or SOB. Was covid + several weeks ago, tested negative for covid yesterday. PT reports she did not eat breakfast,  per EMS.       Reason for Admission  ems     Admission Date  9/18/2022    CODE STATUS  Full Code    HPI & HOSPITAL COURSE    Ms. Gigi Pagan is a 72 year old with a PMHx of cholangiocarcinoma s/p Whipple in remission, hypothyroidism, recent COVID infection approximately 10 days ago, who presented 9/18/2022 due to presyncope.    Patient reports that she was at the dog park with family when she started to feel dizzy while standing.  Patient assisted her down on the ground.  Patient denies losing consciousness, no prodrome of flushing, no warmth, no diaphoresis, no nausea, no chest pain, no palpitations, no dyspnea.  Patient denies any postevent confusion and no previous similar event in the past.  Patient denies any cardiac history, no stroke, no clots.  He used to follow [facility] oncology but was told that her imaging was negative and told that she is in remission.    Patient noted to be positive for COVID approximately 10 days ago associated with body aches, and headache.  Patient repeated home testing prior to admission and noted to be negative and states that all her symptoms has resolved.  Patient reports she has not been taking her levothyroxine for the past year since she has moved back to [place] with family.  She ambulates on her own without any walker assistance.    EMS brought patient to ER and she was noted to be hypotensive with SBP in the 80s.  Patient was given 400 cc of a fluid prior to ER arrival.   In ER, patient noted to have normal vital signs.  Notable lab findings noted WBC at 3.5, lipase 129.  Initial troponin T within normal limits.  D-dimer at 1.0.  CT of the head noted atrophy but no acute findings.  Initial CXR noted no cardiopulmonary process.  Initial EKG noted sinus bradycardia with heart rate at 57 with no ischemic changes.  Patient was admitted into the observation unit for further evaluation and treatment.    During this course of stay, no events noted overnight.  Patient was monitored for any cardiac event.  A cardiac echocardiogram was obtained which noted LVEF approximately 60%.  Bilateral lower extremity ultrasound was also obtained which noted no DVT in either leg.  VQ scan of the lungs also obtained which noted low probability for pulmonary embolism.    Patient seen and examined.  Patient reports symptoms have improved with no other presyncopal symptoms noted.  Discussed with patient imaging results.  Discussed with patient symptoms she will likely experience after having a COVID infection.  Discussed with patient staying hydrated.  I also called patient's daughter Lakhwinder over the phone to give her an update.  Patient to resume all other home medications.  Patient has recommended follow-up with PCP s/p hospitalization.  All questions and concerns answered prior to discharge.  Patient discharged home.    Therefore, she is discharged in good and stable condition to home with close outpatient follow-up.    The patient recovered much more quickly than anticipated on admission.    Discharge Date  09/19/22    FOLLOW UP ITEMS POST DISCHARGE  Please call [phone] to schedule PCP appointment for patient.    Required specialty appointments include:       Discharge Instructions per HUONG SanchezPMiahRMiahN.    -Follow-up with PCP s/p hospitalization  -Keep yourself hydrated with approximately 1.0-1.5L of water daily  -Continue take precautionary measures for COVID-19    DIET: As  indicated    ACTIVITY: As indicated    DIAGNOSIS: Presyncope    Return to ER if symptoms persist, chest pain, palpitations, shortness of breath, numbness, tingling, weakness, and high fevers.      DISCHARGE DIAGNOSES  Principal Problem (Resolved):    Postural dizziness with presyncope POA: Yes  Active Problems:    Hypothyroidism POA: Yes    Cholangiocarcinoma (HCC) POA: Yes      FOLLOW UP    Marcia Knight M.D.  [address] [id number] [phone]    Schedule an appointment as soon as possible for a visit in 1 week(s)        MEDICATIONS ON DISCHARGE     Medication List      You have not been prescribed any medications.         Allergies  Allergies   Allergen Reactions    Gadolinium Anaphylaxis    Iodine Anaphylaxis    Oxycodone Itching    Tape Contact Dermatitis       DIET  Orders Placed This Encounter   Procedures    Diet Order Diet: Regular     Standing Status:   Standing     Number of Occurrences:   1     Order Specific Question:   Diet:     Answer:   Regular [1]       ACTIVITY  As tolerated.  Weight bearing as tolerated    CONSULTATIONS  NONE    PROCEDURES  NONE    IMAGING  NM-LUNG PERFUSION IMAGING   Final Result      Low probability for pulmonary embolism.            EC-ECHOCARDIOGRAM COMPLETE W/O CONT   Final Result      US-EXTREMITY VENOUS LOWER BILAT   Final Result      CT-HEAD W/O   Final Result         NO ACUTE ABNORMALITIES ARE NOTED ON CT SCAN OF THE HEAD.      Findings are consistent with atrophy.  Decreased attenuation in the periventricular white matter likely indicates microvascular ischemic disease.         DX-CHEST-PORTABLE (1 VIEW)   Final Result         No acute cardiac or pulmonary abnormality is identified.            LABORATORY  Lab Results   Component Value Date    SODIUM 140 09/18/2022    POTASSIUM 3.8 09/18/2022    CHLORIDE 107 09/18/2022    CO2 21 09/18/2022    GLUCOSE 122 (H) 09/18/2022    BUN 11 09/18/2022    CREATININE 0.58 09/18/2022    CREATININE 0.8  03/09/2009        Lab Results   Component Value Date    WBC 3.5 (L) 09/18/2022    HEMOGLOBIN 14.1 09/18/2022    HEMATOCRIT 43.6 09/18/2022    PLATELETCT 90 (L) 09/18/2022        Total time of the discharge process took 36 minutes.    =================================================================================================================================================================================  Please note that this dictation was created using voice recognition software. I have made every reasonable attempt to correct obvious errors, but there may be errors of grammar and possibly content that I did not discover before finalizing the note.    Electronically signed by:  GRACIELA Carreon, MSN, APRN, FNP-C  Hospitalist Services  [facility]  [phone]  [email or web address]  09/19/22                   [phone]

## 2022-09-19 NOTE — HOSPITAL COURSE
Ms. Gigi Pagan is a 72 year old with a PMHx of cholangiocarcinoma s/p Whipple in remission, hypothyroidism, recent COVID infection approximately 10 days ago, who presented 9/18/2022 due to presyncope.    Patient reports that she was at the dog park with family when she started to feel dizzy while standing.  Patient assisted her down on the ground.  Patient denies losing consciousness, no prodrome of flushing, no warmth, no diaphoresis, no nausea, no chest pain, no palpitations, no dyspnea.  Patient denies any postevent confusion and no previous similar event in the past.  Patient denies any cardiac history, no stroke, no clots.  He used to follow [facility] oncology but was told that her imaging was negative and told that she is in remission.    Patient noted to be positive for COVID approximately 10 days ago associated with body aches, and headache.  Patient repeated home testing prior to admission and noted to be negative and states that all her symptoms has resolved.  Patient reports she has not been taking her levothyroxine for the past year since she has moved back to [place] with family.  She ambulates on her own without any walker assistance.    EMS brought patient to ER and she was noted to be hypotensive with SBP in the 80s.  Patient was given 400 cc of a fluid prior to ER arrival.  In ER, patient noted to have normal vital signs.  Notable lab findings noted WBC at 3.5, lipase 129.  Initial troponin T within normal limits.  D-dimer at 1.0.  CT of the head noted atrophy but no acute findings.  Initial CXR noted no cardiopulmonary process.  Initial EKG noted sinus bradycardia with heart rate at 57 with no ischemic changes.  Patient was admitted into the observation unit for further evaluation and treatment.    During this course of stay, no events noted overnight.  Patient was monitored for any cardiac event.  A cardiac echocardiogram was obtained which noted LVEF approximately 60%.  Bilateral lower extremity  ultrasound was also obtained which noted no DVT in either leg.  VQ scan of the lungs also obtained which noted low probability for pulmonary embolism.    Patient seen and examined.  Patient reports symptoms have improved with no other presyncopal symptoms noted.  Discussed with patient imaging results.  Discussed with patient symptoms she will likely experience after having a COVID infection.  Discussed with patient staying hydrated.  I also called patient's daughter Lakhwinder over the phone to give her an update.  Patient to resume all other home medications.  Patient has recommended follow-up with PCP s/p hospitalization.  All questions and concerns answered prior to discharge.  Patient discharged home.

## 2022-09-19 NOTE — DISCHARGE INSTRUCTIONS
FOLLOW UP ITEMS POST DISCHARGE  Please call [phone] to schedule PCP appointment for patient.    Required specialty appointments include:       Discharge Instructions per EMILY Sanchez    -Follow-up with PCP s/p hospitalization  -Keep yourself hydrated with approximately 1.0-1.5L of water daily  -Continue take precautionary measures for COVID-19    DIET: As indicated    ACTIVITY: As indicated    DIAGNOSIS: Presyncope    Return to ER if symptoms persist, chest pain, palpitations, shortness of breath, numbness, tingling, weakness, and high fevers.Discharge Instructions    Discharged to home by car with relative. Discharged via wheelchair, hospital escort: Yes.  Special equipment needed:     Be sure to schedule a follow-up appointment with your primary care doctor or any specialists as instructed.     Discharge Plan:   Diet Plan: Discussed  Activity Level: Discussed  Confirmed Follow up Appointment: Patient to Call and Schedule Appointment  Confirmed Symptoms Management: Discussed  Medication Reconciliation Updated: Yes    I understand that a diet low in cholesterol, fat, and sodium is recommended for good health. Unless I have been given specific instructions below for another diet, I accept this instruction as my diet prescription.   Other diet:     Special Instructions: None    -Is this patient being discharged with medication to prevent blood clots?  No    Is patient discharged on Warfarin / Coumadin?   No   Near-Syncope  Near-syncope is when you suddenly get weak or dizzy, or you feel like you might pass out (faint). This may also be called presyncope. This is due to a lack of blood flow to the brain. During an episode of near-syncope, you may:  Feel dizzy, weak, or light-headed.  Feel sick to your stomach (nauseous).  See all white or all black.  See spots.  Have cold, clammy skin.  This condition is caused by a sudden decrease in blood flow to the brain. This decrease can result from  various causes, but most of those causes are not dangerous. However, near-syncope may be a sign of a serious medical problem, so it is important to seek medical care.  Follow these instructions at home:  Medicines  Take over-the-counter and prescription medicines only as told by your doctor.  If you are taking blood pressure or heart medicine, get up slowly and spend many minutes getting ready to sit and then stand. This can help with dizziness.  General instructions  Be aware of any changes in your symptoms.  Talk with your doctor about your symptoms. You may need to have testing to find the cause of your near-syncope.  If you start to feel like you might pass out, lie down right away. Raise (elevate) your feet above the level of your heart. Breathe deeply and steadily. Wait until all of the symptoms are gone.  Have someone stay with you until you feel stable.  Do not drive, use machinery, or play sports until your doctor says it is okay.  Drink enough fluid to keep your pee (urine) pale yellow.  Keep all follow-up visits as told by your doctor. This is important.  Get help right away if you:  Have a seizure.  Have pain in your:  Chest.  Belly (abdomen).  Back.  Faint once or more than once.  Have a very bad headache.  Are bleeding from your mouth or butt.  Have black or tarry poop (stool).  Have a very fast or uneven heartbeat (palpitations).  Are mixed up (confused).  Have trouble walking.  Are very weak.  Have trouble seeing.  These symptoms may be an emergency. Do not wait to see if the symptoms will go away. Get medical help right away. Call your local emergency services (911 in the [place] Do not drive yourself to the hospital.  Summary  Near-syncope is when you suddenly get weak or dizzy, or you feel like you might pass out (faint).  This condition is caused by a lack of blood flow to the brain.  Near-syncope may be a sign of a serious medical problem, so it is important to seek medical care.  This  information is not intended to replace advice given to you by your health care provider. Make sure you discuss any questions you have with your health care provider.  Document Released: 06/05/2009 Document Revised: 04/10/2020 Document Reviewed: 11/06/2019  Elsevier Patient Education © 2020 [employer]     JOINT PAIN/NECK PAIN

## 2022-11-11 ENCOUNTER — PATIENT MESSAGE (OUTPATIENT)
Dept: HEALTH INFORMATION MANAGEMENT | Facility: OTHER | Age: 72
End: 2022-11-11

## 2024-02-05 ENCOUNTER — APPOINTMENT (OUTPATIENT)
Dept: MEDICAL GROUP | Facility: MEDICAL CENTER | Age: 74
End: 2024-02-05
Payer: MEDICARE

## 2024-08-05 ENCOUNTER — APPOINTMENT (OUTPATIENT)
Dept: INTERNAL MEDICINE | Facility: OTHER | Age: 74
End: 2024-08-05
Payer: MEDICARE

## 2024-08-06 NOTE — PROGRESS NOTES
"Pharmacy Chemotherapy Calculation:    Patient Name: Gigi Pagan  Dx: Cholangiocarcinoma    Cycle 9  Previous treatment: 3/1/20    Protocol: Keytruda  Pembrolizumab (Keytruda) 200 mg IV on Day 1  21-day cycle until disease progression or unacceptable toxicity  NCCN Guidelines for Hepatobiliary Cancers. V.1.2019.  Disha REID et al. JCO. 2017;35(15_suppl):abstr 2512.  Yg YJ, et al. Eur J Cancer. 2015;51(3_suppl):S112.         Allergies:Gadolinium and Tape    /55   Pulse 76   Temp 36.7 °C (98 °F) (Temporal)   Resp 18   Ht 1.59 m (5' 2.6\")   Wt 83.2 kg (183 lb 6.8 oz)   SpO2 97%   BMI 32.91 kg/m²  Body surface area is 1.92 meters squared.     All lab results 3/22/20 within treatment parameters.     Pembrolizumab 200 mg fixed dose   No calculation required, ok for final dose = 200 mg IV    Skyler Steele, PharmD    " [phone]

## 2025-01-10 ENCOUNTER — PATIENT MESSAGE (OUTPATIENT)
Dept: HEALTH INFORMATION MANAGEMENT | Facility: OTHER | Age: 75
End: 2025-01-10

## 2025-05-16 ENCOUNTER — TELEPHONE (OUTPATIENT)
Dept: HEALTH INFORMATION MANAGEMENT | Facility: OTHER | Age: 75
End: 2025-05-16
Payer: MEDICARE

## 2025-06-26 ENCOUNTER — TELEPHONE (OUTPATIENT)
Dept: FAMILY PLANNING/WOMEN'S HEALTH CLINIC | Facility: PHYSICIAN GROUP | Age: 75
End: 2025-06-26
Payer: MEDICARE

## 2025-06-26 NOTE — TELEPHONE ENCOUNTER
Pt declined 2025 Comprehensive Health Assessment appt. In a [facility] in [place], unable to do a VV.